# Patient Record
Sex: MALE | Race: WHITE | Employment: OTHER | URBAN - METROPOLITAN AREA
[De-identification: names, ages, dates, MRNs, and addresses within clinical notes are randomized per-mention and may not be internally consistent; named-entity substitution may affect disease eponyms.]

---

## 2017-01-23 ENCOUNTER — OFFICE VISIT (OUTPATIENT)
Dept: INTERNAL MEDICINE CLINIC | Age: 77
End: 2017-01-23

## 2017-01-23 ENCOUNTER — TELEPHONE (OUTPATIENT)
Dept: INTERNAL MEDICINE CLINIC | Age: 77
End: 2017-01-23

## 2017-01-23 VITALS
WEIGHT: 226.4 LBS | HEART RATE: 72 BPM | OXYGEN SATURATION: 97 % | HEIGHT: 73 IN | RESPIRATION RATE: 16 BRPM | TEMPERATURE: 98.1 F | BODY MASS INDEX: 30 KG/M2 | DIASTOLIC BLOOD PRESSURE: 78 MMHG | SYSTOLIC BLOOD PRESSURE: 122 MMHG

## 2017-01-23 DIAGNOSIS — H65.92 MIDDLE EAR EFFUSION, LEFT: Primary | ICD-10-CM

## 2017-01-23 RX ORDER — ZOLPIDEM TARTRATE 5 MG/1
1 TABLET ORAL
Refills: 3 | COMMUNITY
Start: 2017-01-06 | End: 2017-07-06 | Stop reason: ALTCHOICE

## 2017-01-23 NOTE — TELEPHONE ENCOUNTER
Left detailed message that is the generic for flonase and fine to use and to let us know if he needs a refill per Dr Justice Carmichael.

## 2017-01-23 NOTE — PROGRESS NOTES
Glory Pacheco is a 77 y.o. male who was seen in clinic today (1/23/2017). Assessment & Plan:  Dalton Terrazas was seen today for ear pain. Diagnoses and all orders for this visit:    Middle ear effusion, left- this is a new problem, differential dx reviewed with the patient, does not sound infectious. Will treat with flonase & can try sudafed as long as no effect on BP. See AVS, Red flags were reviewed with the patient to RTC or notify me, expected time course for resolution reviewed. If no changes in 5-7 days will update me and will have to see ENT. Follow-up Disposition:  Return if symptoms worsen or fail to improve.       ----------------------------------------------------------------------    Subjective:  HEENT Review  He returns to clinic today to talk about hearing loss, left sided. This has been present for 1-2 weeks, unchanged since that time. He reports started after a URI and all his symptoms have resolved. Treatment to date includes: corcidine HBP. No hearing loss or tinnitus. He reports on/off popping sensation. No h/o trauma. He does wear hearing aides, did go to see specialist.           Prior to Admission medications    Medication Sig Start Date End Date Taking? Authorizing Provider   lisinopril (PRINIVIL, ZESTRIL) 10 mg tablet TAKE 1 TABLET BY MOUTH DAILY 12/15/16  Yes Marcela Ware MD   gabapentin (NEURONTIN) 100 mg capsule TAKE 1 CAPSULE BY MOUTH THREE TIMES DAILY 12/15/16  Yes Marcela Ware MD   metoprolol tartrate (LOPRESSOR) 25 mg tablet TAKE 1/2 TABLET BY MOUTH TWICE DAILY 12/15/16  Yes Marcela Ware MD   sertraline (ZOLOFT) 50 mg tablet TAKE 1 TABLET BY MOUTH DAILY 11/16/16  Yes Marcela Ware MD   Blood-Glucose Meter (TRUE METRIX GLUCOSE METER) misc Use to test blood sugars daily. DX:  E11.49 11/10/16  Yes Marcela Ware MD   glucose blood VI test strips (TRUE METRIX GLUCOSE TEST STRIP) strip Test blood sugars daily.   DX: E11.49 11/10/16  Yes Addy Fountain MD   balsalazide (COLAZAL) 750 mg capsule Take 2,250 mg by mouth two (2) times a day. Yes Historical Provider   metFORMIN (GLUCOPHAGE) 500 mg tablet TAKE 1 TABLET BY MOUTH TWICE DAILY WITH A MEAL 6/10/16  Yes Addy Fountain MD   simvastatin (ZOCOR) 40 mg tablet Take 1 Tab by mouth nightly. 6/10/16  Yes Addy Fountain MD   Lancets & Blood Glucose Strips Cmpk Element glucometer 2/26/13  Yes Compa Espinosa MD   aspirin 81 mg tablet Take 81 mg by mouth daily. Indications: MYOCARDIAL INFARCTION PREVENTION   Yes Historical Provider   MULTIVITAMINS WITH FLUORIDE (MULTI-VITAMIN PO) Take 1 tablet by mouth daily. Yes Historical Provider   Cholecalciferol, Vitamin D3, (VITAMIN D3) 1,000 unit Cap Take 2,000 Units by mouth daily. Indications: PREVENTION OF VITAMIN D DEFICIENCY   Yes Historical Provider   zolpidem (AMBIEN) 5 mg tablet Take 1 Tab by mouth nightly as needed. 1/6/17   Historical Provider   fluticasone (FLONASE) 50 mcg/actuation nasal spray USE 2 SPRAYS IN EACH NOSTRIL DAILY 6/14/16   Addy Fountain MD   VIT B6/MAG CIT & OX/POTASS CIT (THERALITH XR PO) Take 2 tablets by mouth two (2) times a day. Historical Provider   psyllium (METAMUCIL) 1.7 g Wafr Take 0.5 Wafers by mouth daily. Indications: CONSTIPATION    Historical Provider          Allergies   Allergen Reactions    Apriso [Mesalamine] Diarrhea    Prednisone Other (comments)     constipation    Sulfasalazine Hives           ROS : per HPI       Objective:   Physical Exam   Constitutional: No distress. HENT:   Right Ear: Tympanic membrane is not erythematous and not bulging. No middle ear effusion. Left Ear: Tympanic membrane is not erythematous and not bulging. A middle ear effusion (significant, appears to be yellowish fluid) is present. Nose: No mucosal edema or rhinorrhea. Mouth/Throat: Uvula is midline and mucous membranes are normal. No oropharyngeal exudate or posterior oropharyngeal erythema.    Eyes: Conjunctivae are normal. No scleral icterus. Cardiovascular: Regular rhythm and normal heart sounds. No murmur heard. Pulmonary/Chest: Effort normal and breath sounds normal. He has no wheezes. He has no rales. Visit Vitals    /78 (BP 1 Location: Right arm, BP Patient Position: Sitting)    Pulse 72    Temp 98.1 °F (36.7 °C) (Oral)    Resp 16    Ht 6' 1\" (1.854 m)    Wt 226 lb 6.4 oz (102.7 kg)    SpO2 97%    BMI 29.87 kg/m2         Disclaimer:  Advised him to call back or return to office if symptoms worsen/change/persist.  Discussed expected course/resolution/complications of diagnosis in detail with patient. Medication risks/benefits/costs/interactions/alternatives discussed with patient. He was given an after visit summary which includes diagnoses, current medications, & vitals. He expressed understanding with the diagnosis and plan.         Clinton Vallecillo MD

## 2017-01-23 NOTE — PATIENT INSTRUCTIONS
Eustachian Tube Problems: Care Instructions  Your Care Instructions    The eustachian (say \"you-STAY-shee-un\") tubes run between the inside of the ears and the throat. They keep air pressure stable in the ears. If your eustachian tubes become blocked, the air pressure in your ears changes. The fluids from a cold can clog eustachian tubes, causing pain in the ears. A quick change in air pressure can cause eustachian tubes to close up. This might happen when an airplane changes altitude or when a  goes up or down underwater. Eustachian tube problems often clear up on their own or after antibiotic treatment. If your tubes continue to be blocked, you may need surgery. Follow-up care is a key part of your treatment and safety. Be sure to make and go to all appointments, and call your doctor if you are having problems. It's also a good idea to know your test results and keep a list of the medicines you take. How can you care for yourself at home? · To ease ear pain, apply a warm washcloth or a heating pad set on low. There may be some drainage from the ear when the heat melts earwax. Put a cloth between the heat source and your skin. Do not use a heating pad with children. · If your doctor prescribed antibiotics, take them as directed. Do not stop taking them just because you feel better. You need to take the full course of antibiotics. · Your doctor may recommend over-the-counter medicine. Be safe with medicines. Oral or nasal decongestants may relieve ear pain. Avoid decongestants that are combined with antihistamines, which tend to cause more blockage. But if allergies seem to be the problem, your doctor may recommend a combination. Be careful with cough and cold medicines. Don't give them to children younger than 6, because they don't work for children that age and can even be harmful. For children 6 and older, always follow all the instructions carefully.  Make sure you know how much medicine to give and how long to use it. And use the dosing device if one is included. When should you call for help? Call your doctor now or seek immediate medical care if:  · You develop sudden, complete hearing loss. · You have severe pain or feel dizzy. · You have new or increasing pus or blood draining from your ear. · You have redness, swelling, or pain around or behind the ear. Watch closely for changes in your health, and be sure to contact your doctor if:  · You do not get better after 2 weeks. · You have any new symptoms, such as itching or a feeling of fullness in the ear. Where can you learn more? Go to http://jesús-jhoana.info/. Enter Y822 in the search box to learn more about \"Eustachian Tube Problems: Care Instructions. \"  Current as of: July 29, 2016  Content Version: 11.1  © 4467-1300 Healthwise, Incorporated. Care instructions adapted under license by [employer] (which disclaims liability or warranty for this information). If you have questions about a medical condition or this instruction, always ask your healthcare professional. [address] any warranty or liability for your use of this information.

## 2017-01-23 NOTE — MR AVS SNAPSHOT
Visit Information Date & Time Provider Department Dept. Phone Encounter #  
 1/23/2017  8:15 AM Danilo Salazar, [address] [facility] [phone] [id number] Follow-up Instructions Return if symptoms worsen or fail to improve. Your Appointments 2/14/2017  9:30 AM  
PHYSICAL with Danilo Salazar MD  
[facility]) Appt Note: CPE; .  
 [address] [facility] [address]  
[id number]  [facility] Upcoming Health Maintenance Date Due  
 MEDICARE YEARLY EXAM 12/20/2015 COLONOSCOPY 6/3/2016 HEMOGLOBIN A1C Q6M 5/14/2017 MICROALBUMIN Q1 5/23/2017 EYE EXAM RETINAL OR DILATED Q1 6/8/2017 FOOT EXAM Q1 6/10/2017 LIPID PANEL Q1 11/14/2017 GLAUCOMA SCREENING Q2Y 6/9/2018 DTaP/Tdap/Td series (2 - Td) 10/22/2022 Allergies as of 1/23/2017  Review Complete On: 1/23/2017 By: Danilo Salazar MD  
  
 Severity Noted Reaction Type Reactions Apriso [Mesalamine]  10/25/2016    Diarrhea Prednisone  10/19/2015    Other (comments)  
 constipation Sulfasalazine  10/25/2016    Hives Current Immunizations  Reviewed on 10/19/2015 Name Date Influenza High Dose Vaccine PF 9/25/2016, 10/17/2015, 10/1/2014 Influenza Vaccine 9/16/2012 Pneumococcal Conjugate (PCV-13) 7/12/2015 Pneumococcal Polysaccharide (PPSV-23) 9/25/2016 Pneumococcal Vaccine (Unspecified Type) 10/22/2012 TDAP Vaccine 10/22/2012 Zoster Vaccine, Live 3/11/2015 Not reviewed this visit You Were Diagnosed With   
  
 Codes Comments Middle ear effusion, left    -  Primary ICD-10-CM: H65.92 
ICD-9-CM: 381. 4 Vitals BP Pulse Temp Resp Height(growth percentile) Weight(growth percentile) 122/78 (BP 1 Location: Right arm, BP Patient Position: Sitting) 72 98.1 °F (36.7 °C) (Oral) 16 6' 1\" (1.854 m) 226 lb 6.4 oz (102.7 kg) SpO2 BMI Smoking Status 97% 29.87 kg/m2 Former Smoker Vitals History BMI and BSA Data Body Mass Index Body Surface Area  
 29.87 kg/m 2 2.3 m 2 Preferred Pharmacy Pharmacy Name Phone [facility], [address] [place] [address] [phone] Your Updated Medication List  
  
   
This list is accurate as of: 1/23/17  8:32 AM.  Always use your most recent med list.  
  
  
  
  
 aspirin 81 mg tablet Take 81 mg by mouth daily. Indications: MYOCARDIAL INFARCTION PREVENTION  
  
 balsalazide 750 mg capsule Commonly known as:  [place] So Take 2,250 mg by mouth two (2) times a day. Blood-Glucose Meter Misc Commonly known as:  TRUE METRIX GLUCOSE METER Use to test blood sugars daily. DX:  E11.49  
  
 fluticasone 50 mcg/actuation nasal spray Commonly known as:  FLONASE  
USE 2 SPRAYS IN EACH NOSTRIL DAILY  
  
 gabapentin 100 mg capsule Commonly known as:  NEURONTIN  
TAKE 1 CAPSULE BY MOUTH THREE TIMES DAILY  
  
 glucose blood VI test strips strip Commonly known as:  TRUE METRIX GLUCOSE TEST STRIP Test blood sugars daily. DX: E11.49 Lancets & Blood Glucose Strips Cmpk Element glucometer  
  
 lisinopril 10 mg tablet Commonly known as:  PRINIVIL, ZESTRIL  
TAKE 1 TABLET BY MOUTH DAILY METAMUCIL Wafr oral wafer Generic drug:  psyllium Take 0.5 Wafers by mouth daily. Indications: CONSTIPATION  
  
 metFORMIN 500 mg tablet Commonly known as:  GLUCOPHAGE  
TAKE 1 TABLET BY MOUTH TWICE DAILY WITH A MEAL  
  
 metoprolol tartrate 25 mg tablet Commonly known as:  LOPRESSOR  
TAKE 1/2 TABLET BY MOUTH TWICE DAILY MULTI-VITAMIN PO Take 1 tablet by mouth daily. sertraline 50 mg tablet Commonly known as:  ZOLOFT  
TAKE 1 TABLET BY MOUTH DAILY  
  
 simvastatin 40 mg tablet Commonly known as:  ZOCOR Take 1 Tab by mouth nightly.   
  
 THERALITH XR PO  
 Take 2 tablets by mouth two (2) times a day. VITAMIN D3 1,000 unit Cap Generic drug:  cholecalciferol Take 2,000 Units by mouth daily. Indications: PREVENTION OF VITAMIN D DEFICIENCY  
  
 zolpidem 5 mg tablet Commonly known as:  AMBIEN Take 1 Tab by mouth nightly as needed. Follow-up Instructions Return if symptoms worsen or fail to improve. Patient Instructions Eustachian Tube Problems: Care Instructions Your Care Instructions The eustachian (say \"you-STAY-shee-un\") tubes run between the inside of the ears and the throat. They keep air pressure stable in the ears. If your eustachian tubes become blocked, the air pressure in your ears changes. The fluids from a cold can clog eustachian tubes, causing pain in the ears. A quick change in air pressure can cause eustachian tubes to close up. This might happen when an airplane changes altitude or when a  goes up or down underwater. Eustachian tube problems often clear up on their own or after antibiotic treatment. If your tubes continue to be blocked, you may need surgery. Follow-up care is a key part of your treatment and safety. Be sure to make and go to all appointments, and call your doctor if you are having problems. It's also a good idea to know your test results and keep a list of the medicines you take. How can you care for yourself at home? · To ease ear pain, apply a warm washcloth or a heating pad set on low. There may be some drainage from the ear when the heat melts earwax. Put a cloth between the heat source and your skin. Do not use a heating pad with children. · If your doctor prescribed antibiotics, take them as directed. Do not stop taking them just because you feel better. You need to take the full course of antibiotics. · Your doctor may recommend over-the-counter medicine. Be safe with medicines. Oral or nasal decongestants may relieve ear pain.  Avoid decongestants that are combined with antihistamines, which tend to cause more blockage. But if allergies seem to be the problem, your doctor may recommend a combination. Be careful with cough and cold medicines. Don't give them to children younger than 6, because they don't work for children that age and can even be harmful. For children 6 and older, always follow all the instructions carefully. Make sure you know how much medicine to give and how long to use it. And use the dosing device if one is included. When should you call for help? Call your doctor now or seek immediate medical care if: 
· You develop sudden, complete hearing loss. · You have severe pain or feel dizzy. · You have new or increasing pus or blood draining from your ear. · You have redness, swelling, or pain around or behind the ear. Watch closely for changes in your health, and be sure to contact your doctor if: 
· You do not get better after 2 weeks. · You have any new symptoms, such as itching or a feeling of fullness in the ear. Where can you learn more? Go to http://jesús-jhoana.info/. Enter Y822 in the search box to learn more about \"Eustachian Tube Problems: Care Instructions. \" Current as of: July 29, 2016 Content Version: 11.1 © 1640-6720 [employer]. Care instructions adapted under license by [employer] (which disclaims liability or warranty for this information). If you have questions about a medical condition or this instruction, always ask your healthcare professional. [place] [address] any warranty or liability for your use of this information. Introducing [facility]! Valerie Ashraf introduces [employer] patient portal. Now you can access parts of your medical record, email your doctor's office, and request medication refills online. 1. In your internet browser, go to [email or web address] 2. Click on the First Time User? Click Here link in the Sign In box. You will see the New Member Sign Up page. 3. Enter your [email or web address] Access Code exactly as it appears below. You will not need to use this code after youve completed the sign-up process. If you do not sign up before the expiration date, you must request a new code. · [email or web address] Access Code: Farooq Blanc Expires: 1/23/2017 10:12 AM 
 
4. Enter the last four digits of your Social Security Number (xxxx) and Date of Birth (mm/dd/yyyy) as indicated and click Submit. You will be taken to the next sign-up page. 5. Create a [email or web address] ID. This will be your [email or web address] login ID and cannot be changed, so think of one that is secure and easy to remember. 6. Create a [email or web address] password. You can change your password at any time. 7. Enter your Password Reset Question and Answer. This can be used at a later time if you forget your password. 8. Enter your e-mail address. You will receive e-mail notification when new information is available in [address] 9. Click Sign Up. You can now view and download portions of your medical record. 10. Click the Download Summary menu link to download a portable copy of your medical information. If you have questions, please visit the Frequently Asked Questions section of the [email or web address] website. Remember, [email or web address] is NOT to be used for urgent needs. For medical emergencies, dial 911. Now available from your iPhone and Android! Please provide this summary of care documentation to your next provider. Your primary care clinician is listed as Oscar Webb. If you have any questions after today's visit, please call [phone].

## 2017-01-30 ENCOUNTER — TELEPHONE (OUTPATIENT)
Dept: INTERNAL MEDICINE CLINIC | Age: 77
End: 2017-01-30

## 2017-01-30 NOTE — TELEPHONE ENCOUNTER
Spoke with pt who states his ear is not really better he states he did take the flonase and sudafed but when he lays back in the recliner his ear will pop. Then when he stands it goes back to not being able to hear out of the ear. Please advise.

## 2017-01-30 NOTE — TELEPHONE ENCOUNTER
Needs to see ENT (Dr Radha Gold or Mik Segura). Did not look infected so no abx. If these meds did not help I want the specialist input.

## 2017-01-31 DIAGNOSIS — E11.49 CONTROLLED TYPE 2 DIABETES MELLITUS WITH OTHER NEUROLOGIC COMPLICATION: Primary | ICD-10-CM

## 2017-02-03 DIAGNOSIS — E11.42 DIABETIC PERIPHERAL NEUROPATHY (HCC): ICD-10-CM

## 2017-02-03 RX ORDER — METFORMIN HYDROCHLORIDE 500 MG/1
TABLET ORAL
Qty: 180 TAB | Refills: 1 | Status: SHIPPED | OUTPATIENT
Start: 2017-02-03 | End: 2017-07-06 | Stop reason: SDUPTHER

## 2017-02-04 DIAGNOSIS — E11.42 DIABETIC PERIPHERAL NEUROPATHY (HCC): ICD-10-CM

## 2017-02-04 DIAGNOSIS — E78.5 HYPERLIPEMIA: ICD-10-CM

## 2017-02-05 RX ORDER — SIMVASTATIN 40 MG/1
TABLET, FILM COATED ORAL
Qty: 90 TAB | Refills: 0 | Status: SHIPPED | OUTPATIENT
Start: 2017-02-05 | End: 2017-07-06 | Stop reason: SDUPTHER

## 2017-02-07 ENCOUNTER — HOSPITAL ENCOUNTER (OUTPATIENT)
Dept: LAB | Age: 77
Discharge: HOME | End: 2017-02-07
Payer: MEDICARE

## 2017-02-07 PROCEDURE — 82043 UR ALBUMIN QUANTITATIVE: CPT

## 2017-02-07 PROCEDURE — 83036 HEMOGLOBIN GLYCOSYLATED A1C: CPT

## 2017-02-07 PROCEDURE — 80053 COMPREHEN METABOLIC PANEL: CPT

## 2017-02-07 PROCEDURE — 36415 COLL VENOUS BLD VENIPUNCTURE: CPT

## 2017-02-08 LAB
ALBUMIN SERPL-MCNC: 4.5 G/DL (ref 3.5–4.8)
ALBUMIN/CREAT UR: 9.4 MG/G CREAT (ref 0–30)
ALBUMIN/GLOB SERPL: 2 {RATIO} (ref 1.1–2.5)
ALP SERPL-CCNC: 81 IU/L (ref 39–117)
ALT SERPL-CCNC: 24 IU/L (ref 0–44)
AST SERPL-CCNC: 28 IU/L (ref 0–40)
BILIRUB SERPL-MCNC: 0.4 MG/DL (ref 0–1.2)
BUN SERPL-MCNC: 9 MG/DL (ref 8–27)
BUN/CREAT SERPL: 9 (ref 10–22)
CALCIUM SERPL-MCNC: 9.6 MG/DL (ref 8.6–10.2)
CHLORIDE SERPL-SCNC: 95 MMOL/L (ref 96–106)
CO2 SERPL-SCNC: 22 MMOL/L (ref 18–29)
CREAT SERPL-MCNC: 1.01 MG/DL (ref 0.76–1.27)
CREAT UR-MCNC: 68 MG/DL
EST. AVERAGE GLUCOSE BLD GHB EST-MCNC: 151 MG/DL
GLOBULIN SER CALC-MCNC: 2.3 G/DL (ref 1.5–4.5)
GLUCOSE SERPL-MCNC: 149 MG/DL (ref 65–99)
HBA1C MFR BLD: 6.9 % (ref 4.8–5.6)
MICROALBUMIN UR-MCNC: 6.4 UG/ML
POTASSIUM SERPL-SCNC: 4.7 MMOL/L (ref 3.5–5.2)
PROT SERPL-MCNC: 6.8 G/DL (ref 6–8.5)
SODIUM SERPL-SCNC: 141 MMOL/L (ref 134–144)

## 2017-02-14 ENCOUNTER — OFFICE VISIT (OUTPATIENT)
Dept: INTERNAL MEDICINE CLINIC | Age: 77
End: 2017-02-14

## 2017-02-14 VITALS
OXYGEN SATURATION: 98 % | HEART RATE: 76 BPM | WEIGHT: 225.4 LBS | RESPIRATION RATE: 16 BRPM | HEIGHT: 72 IN | DIASTOLIC BLOOD PRESSURE: 68 MMHG | SYSTOLIC BLOOD PRESSURE: 102 MMHG | TEMPERATURE: 97.9 F | BODY MASS INDEX: 30.53 KG/M2

## 2017-02-14 DIAGNOSIS — Z00.00 MEDICARE ANNUAL WELLNESS VISIT, SUBSEQUENT: Primary | ICD-10-CM

## 2017-02-14 DIAGNOSIS — F33.41 RECURRENT MAJOR DEPRESSIVE DISORDER, IN PARTIAL REMISSION (HCC): ICD-10-CM

## 2017-02-14 DIAGNOSIS — I25.10 CORONARY ARTERY DISEASE INVOLVING NATIVE CORONARY ARTERY OF NATIVE HEART WITHOUT ANGINA PECTORIS: ICD-10-CM

## 2017-02-14 DIAGNOSIS — Z71.89 ACP (ADVANCE CARE PLANNING): ICD-10-CM

## 2017-02-14 DIAGNOSIS — Z00.00 ROUTINE GENERAL MEDICAL EXAMINATION AT A HEALTH CARE FACILITY: ICD-10-CM

## 2017-02-14 DIAGNOSIS — Z13.39 SCREENING FOR ALCOHOLISM: ICD-10-CM

## 2017-02-14 DIAGNOSIS — E66.9 OBESITY (BMI 30.0-34.9): ICD-10-CM

## 2017-02-14 DIAGNOSIS — K51.919 ULCERATIVE COLITIS WITH COMPLICATION, UNSPECIFIED LOCATION (HCC): ICD-10-CM

## 2017-02-14 DIAGNOSIS — E11.49 CONTROLLED TYPE 2 DIABETES MELLITUS WITH OTHER NEUROLOGIC COMPLICATION: ICD-10-CM

## 2017-02-14 NOTE — MR AVS SNAPSHOT
Visit Information Date & Time Provider Department Dept. Phone Encounter #  
 2/14/2017  9:30 AM Richrd Paget, [facility] Internal Medicine [phone] [id number] Follow-up Instructions Return in about 6 months (around 8/14/2017). Upcoming Health Maintenance Date Due  
 MEDICARE YEARLY EXAM 12/20/2015 COLONOSCOPY 6/3/2016 EYE EXAM RETINAL OR DILATED Q1 6/8/2017 FOOT EXAM Q1 6/10/2017 HEMOGLOBIN A1C Q6M 8/7/2017 LIPID PANEL Q1 11/14/2017 MICROALBUMIN Q1 2/7/2018 GLAUCOMA SCREENING Q2Y 6/9/2018 DTaP/Tdap/Td series (2 - Td) 10/22/2022 Allergies as of 2/14/2017  Review Complete On: 2/14/2017 By: Richrd Paget, MD  
  
 Severity Noted Reaction Type Reactions Apriso [Mesalamine]  10/25/2016    Diarrhea Prednisone  10/19/2015    Other (comments)  
 constipation Sulfasalazine  10/25/2016    Hives Current Immunizations  Reviewed on 2/14/2017 Name Date Influenza High Dose Vaccine PF 9/25/2016, 10/17/2015, 10/1/2014 Influenza Vaccine 9/16/2012 Pneumococcal Conjugate (PCV-13) 7/12/2015 Pneumococcal Polysaccharide (PPSV-23) 9/25/2016 Pneumococcal Vaccine (Unspecified Type) 10/22/2012 TDAP Vaccine 10/22/2012 Zoster Vaccine, Live 3/11/2015 Reviewed by Eryn Hatfield RN on 2/14/2017 at  9:48 AM  
You Were Diagnosed With   
  
 Codes Comments Medicare annual wellness visit, subsequent    -  Primary ICD-10-CM: Z00.00 ICD-9-CM: V70.0 ACP (advance care planning)     ICD-10-CM: Z71.89 ICD-9-CM: V65.49 Obesity (BMI 30.0-34.9)     ICD-10-CM: I23.2 ICD-9-CM: 278.00 Routine general medical examination at a health care facility     ICD-10-CM: Z00.00 ICD-9-CM: V70.0 Screening for alcoholism     ICD-10-CM: Z13.89 ICD-9-CM: V79.1 Coronary artery disease involving native coronary artery of native heart without angina pectoris     ICD-10-CM: I25.10 ICD-9-CM: 414.01   
 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 
ICD-9-CM: 296.35 Ulcerative colitis with complication, unspecified location [facility])     ICD-10-CM: F77.517 ICD-9-CM: 345. 9 Vitals BP Pulse Temp Resp Height(growth percentile) Weight(growth percentile) 102/68 76 97.9 °F (36.6 °C) (Oral) 16 6' 0.1\" (1.831 m) 225 lb 6.4 oz (102.2 kg) SpO2 BMI Smoking Status 98% 30.48 kg/m2 Former Smoker BMI and BSA Data Body Mass Index Body Surface Area  
 30.48 kg/m 2 2.28 m 2 Preferred Pharmacy Pharmacy Name Phone [facility] [address] [place] [address], [address] [place] [address] [phone] Your Updated Medication List  
  
   
This list is accurate as of: 2/14/17 10:29 AM.  Always use your most recent med list.  
  
  
  
  
 aspirin 81 mg tablet Take 81 mg by mouth daily. Indications: MYOCARDIAL INFARCTION PREVENTION  
  
 balsalazide 750 mg capsule Commonly known as:  Leonard Noy Take 2,250 mg by mouth two (2) times a day. Blood-Glucose Meter Misc Commonly known as:  TRUE METRIX GLUCOSE METER Use to test blood sugars daily. DX:  E11.49  
  
 fluticasone 50 mcg/actuation nasal spray Commonly known as:  FLONASE  
USE 2 SPRAYS IN EACH NOSTRIL DAILY  
  
 gabapentin 100 mg capsule Commonly known as:  NEURONTIN  
TAKE 1 CAPSULE BY MOUTH THREE TIMES DAILY  
  
 glucose blood VI test strips strip Commonly known as:  TRUE METRIX GLUCOSE TEST STRIP Test blood sugars daily. DX: E11.49 Lancets & Blood Glucose Strips Cmpk Element glucometer  
  
 lisinopril 10 mg tablet Commonly known as:  PRINIVIL, ZESTRIL  
TAKE 1 TABLET BY MOUTH DAILY  
  
 metFORMIN 500 mg tablet Commonly known as:  GLUCOPHAGE  
TAKE 1 TABLET BY MOUTH TWICE DAILY WITH MEAL  
  
 metoprolol tartrate 25 mg tablet Commonly known as:  LOPRESSOR  
TAKE 1/2 TABLET BY MOUTH TWICE DAILY MULTI-VITAMIN PO Take 1 tablet by mouth daily. sertraline 50 mg tablet Commonly known as:  ZOLOFT  
TAKE 1 TABLET BY MOUTH DAILY  
  
 simvastatin 40 mg tablet Commonly known as:  ZOCOR  
TAKE 1 TABLET BY MOUTH NIGHTLY THERALITH XR PO Take 2 tablets by mouth two (2) times a day. VITAMIN D3 1,000 unit Cap Generic drug:  cholecalciferol Take 2,000 Units by mouth daily. Indications: PREVENTION OF VITAMIN D DEFICIENCY  
  
 zolpidem 5 mg tablet Commonly known as:  AMBIEN Take 1 Tab by mouth nightly as needed. We Performed the Following Eliceovalorie  [[id number] [facility]] Follow-up Instructions Return in about 6 months (around 8/14/2017). Patient Instructions Medicare Wellness Visit, Male The best way to live healthy is to have a healthy lifestyle by eating a well-balanced diet, exercising regularly, limiting alcohol and stopping smoking. Regular physical exams and screening tests are another way to keep healthy. Preventive exams provided by your health care provider can find health problems before they become diseases or illnesses. Preventive services including immunizations, screening tests, monitoring and exams can help you take care of your own health. All people over age 72 should have a pneumovax  and and a prevnar shot to prevent pneumonia. These are once in a lifetime unless you and your provider decide differently. All people over 65 should have a yearly flu shot and a tetanus vaccine every 10 years. Screening for diabetes mellitus with a blood sugar test should be done every year.  
 
Glaucoma is a disease of the eye due to increased ocular pressure that can lead to blindness and it should be done every year by an eye professional. 
 
Cardiovascular screening tests that check for elevated lipids (fatty part of blood) which can lead to heart disease and strokes should be done every 5 years. Colorectal screening that evaluates for blood or polyps in your colon should be done yearly as a stool test or every five years as a flexible sigmoidoscope or every 10 years as a colonoscopy up to age 76. Men up to age 76 may need a screening blood test for prostate cancer at certain intervals, depending on their personal and family history. This decision is between the patient and his provider. If you have been a smoker or had family history of abdominal aortic aneurysms, you and your provider may decide to schedule an ultrasound test of your aorta. Hepatitis C screening is also recommended for anyone born between [birth year] through [address]. A shingles vaccine is also recommended once in a lifetime after age 61. Your Medicare Wellness Exam is recommended annually. Here is a list of your current Health Maintenance items with a due date: 
Health Maintenance Due Topic Date Due  
 Annual Well Visit  12/20/2015  Colonoscopy  06/03/2016 Advance Directives: Care Instructions Your Care Instructions An advance directive is a legal way to state your wishes at the end of your life. It tells your family and your doctor what to do if you can no longer say what you want. There are two main types of advance directives. You can change them any time that your wishes change. · A living will tells your family and your doctor your wishes about life support and other treatment. · A medical power of  lets you name a person to make treatment decisions for you when you can't speak for yourself. This person is called a health care agent. If you do not have an advance directive, decisions about your medical care may be made by a doctor or a  who doesn't know you. It may help to think of an advance directive as a gift to the people who care for you.  If you have one, they won't have to make tough decisions by themselves. Follow-up care is a key part of your treatment and safety. Be sure to make and go to all appointments, and call your doctor if you are having problems. It's also a good idea to know your test results and keep a list of the medicines you take. How can you care for yourself at home? · Discuss your wishes with your loved ones and your doctor. This way, there are no surprises. · Many states have a unique form. Or you might use a universal form that has been approved by many states. This kind of form can sometimes be completed and stored online. Your electronic copy will then be available wherever you have a connection to the Internet. In most cases, doctors will respect your wishes even if you have a form from a different state. · You don't need a  to do an advance directive. But you may want to get legal advice. · Think about these questions when you prepare an advance directive: ¨ Who do you want to make decisions about your medical care if you are not able to? Many people choose a family member, close friend, or doctor. ¨ Do you know enough about life support methods that might be used? If not, talk to your doctor so you understand. ¨ What are you most afraid of that might happen? You might be afraid of having pain, losing your independence, or being kept alive by machines. ¨ Where would you prefer to die? Choices include your home, a hospital, or a nursing home. ¨ Would you like to have information about hospice care to support you and your family? ¨ Do you want to donate organs when you die? ¨ Do you want certain Baptism practices performed before you die? If so, put your wishes in the advance directive. · Read your advance directive every year, and make changes as needed. When should you call for help? Be sure to contact your doctor if you have any questions. Where can you learn more? Go to http://jesús-jhoana.info/. Enter R264 in the search box to learn more about \"Advance Directives: Care Instructions. \" Current as of: February 24, 2016 Content Version: 11.1 © 1955-7309 [employer]. Care instructions adapted under license by [employer] (which disclaims liability or warranty for this information). If you have questions about a medical condition or this instruction, always ask your healthcare professional. [address] any warranty or liability for your use of this information. Introducing [facility]! [facility] introduces [employer] patient portal. Now you can access parts of your medical record, email your doctor's office, and request medication refills online. 1. In your internet browser, go to [email or web address] 2. Click on the First Time User? Click Here link in the Sign In box. You will see the New Member Sign Up page. 3. Enter your [employer] Access Code exactly as it appears below. You will not need to use this code after youve completed the sign-up process. If you do not sign up before the expiration date, you must request a new code. · [employer] Access Code: [email or web address] Expires: 5/15/2017  9:35 AM 
 
4. Enter the last four digits of your Social Security Number (xxxx) and Date of Birth (mm/dd/yyyy) as indicated and click Submit. You will be taken to the next sign-up page. 5. Create a [employer] ID. This will be your [employer] login ID and cannot be changed, so think of one that is secure and easy to remember. 6. Create a [employer] password. You can change your password at any time. 7. Enter your Password Reset Question and Answer. This can be used at a later time if you forget your password. 8. Enter your e-mail address. You will receive e-mail notification when new information is available in [address] 9. Click Sign Up. You can now view and download portions of your medical record. 10. Click the Download Summary menu link to download a portable copy of your medical information. If you have questions, please visit the Frequently Asked Questions section of the [employer] website. Remember, [employer] is NOT to be used for urgent needs. For medical emergencies, dial 911. Now available from your iPhone and Android! Please provide this summary of care documentation to your next provider. Your primary care clinician is listed as Simran Canales. If you have any questions after today's visit, please call [phone].

## 2017-02-14 NOTE — ACP (ADVANCE CARE PLANNING)
Advance Care Planning (ACP) Provider Note - Comprehensive     Date of ACP Conversation: 02/14/17  Persons included in Conversation:  patient and family    Authorized Decision Maker (if patient is incapable of making informed decisions): This person is: Other Legally Authorized Decision Maker (e.g. Next of Kin)        General ACP for ALL Patients with Decision Making Capacity:  Importance of advance care planning, including choosing a healthcare agent to communicate patient's healthcare decisions if patient lost the ability to make decisions, such as after a sudden illness or accident  Understanding of the healthcare agent role was assessed and information provided  Opportunity offered to explore how cultural, Mormon, spiritual, or personal beliefs would affect decisions for future care     For Serious or Chronic Illness:  His medical problems were reviewed with them. Understanding of medical condition    Understanding of CPR, goals and expected outcomes, benefits and burdens discussed. Information on CPR success rates provided (e.g. for CPR in hospital, survival to d/c at two weeks is 22%, for chronically ill or elderly/frail survival is less than 3%); Individual asked to communicate understanding of information in his/her own words. Interventions Provided:  Recommended communicating the plan and making copies for the healthcare agent, personal physician, and others as appropriate (e.g., health system)  Recommended review of completed ACP document annually or upon change in health status      he has an advanced directive - a copy HAS NOT been provided. Reviewed DNR/DNI and patient is not interested.

## 2017-02-14 NOTE — PROGRESS NOTES
Marcelle Cortez is a 77 y.o. male who was seen in clinic today (2/14/2017). Assessment & Plan:   Alfred Torres was seen today for complete physical.  Diagnoses and all orders for this visit:    Medicare annual wellness visit, subsequent    ACP (advance care planning)    Obesity (BMI 30.0-34.9)- stable, needs improvement, I have reviewed/discussed the above normal BMI with the patient. I have recommended the following interventions: encourage exercise and lifestyle education regarding diet. Screening for alcoholism    Coronary artery disease involving native coronary artery of native heart without angina pectoris- BP is well controlled, lipids are well controlled. Continue taking: current medications. 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 He was asking about increasing MTF, will keep at 1 tab BID for now and work on diet, if still no changes will increase at next visit   -     Depression Screen Annual    Recurrent major depressive disorder, in partial remission ([facility] [address]- stable, continue current treatment     Ulcerative colitis with complication, unspecified location [facility])- due for repeat c-scope         Follow-up Disposition:  Return in about 6 months (around 8/14/2017).        ------------------------------------------------------------------------------------------    Subjective: Annual Wellness Visit- Subsequent Visit    End of Life Planning: This was discussed with him today and he has an advanced directive - a copy HAS NOT been provided. Reviewed DNR/DNI and patient is not interested.       Depression Screen:   PHQ 2 / 9, over the last two weeks 2/14/2017   Little interest or pleasure in doing things Not at all   Feeling down, depressed or hopeless Several days   Total Score PHQ 2 1   Trouble falling or staying asleep, or sleeping too much Not at all   Feeling tired or having little energy Not at all   Poor appetite or overeating Several days   Feeling bad about yourself - or that you are a failure or have let yourself or your family down Several days   Trouble concentrating on things such as school, work, reading or watching TV Not at all   Moving or speaking so slowly that other people could have noticed; or the opposite being so fidgety that others notice Not at all   Thoughts of being better off dead, or hurting yourself in some way Not at all   PHQ 9 Score 3   How difficult have these problems made it for you to do your work, take care of your home and get along with others Somewhat difficult       Fall Risk:   Fall Risk Assessment, last 12 mths 2/14/2017   Able to walk? Yes   Fall in past 12 months? No       Abuse Screen:  Abuse Screening Questionnaire 2/14/2017   Do you ever feel afraid of your partner? N   Are you in a relationship with someone who physically or mentally threatens you? N   Is it safe for you to go home? Y         Alcohol Risk Factor Screening: On any occasion during the past 3 months, have you had more than 4 drinks containing alcohol? No  Do you average more than 14 drinks per week? No    Hearing Loss:  wears hearing aides    Activities of Daily Living:  Self-care. Requires assistance with: no ADLs    Adult Nutrition Screen:  No risk factors noted. Health Maintenance  Daily Aspirin: yes  AAA Screening: n/a (IPPE only)  Glaucoma Screening: UTD      Immunizations:     Influenza: up to date. Tetanus: up to date. Shingles: up to date. Pneumonia: up to date. Cancer screening:    Prostate: reviewed guidelines, will defer. Colon: up to date.         Patient Care Team:  Oscar Webb MD as PCP - General (Internal Medicine)  Miriam Garcia MD (Ophthalmology)  Quynh Anderson MD as Physician (Urology)  Rachael Mcguire RN as Nurse Rakesh Felix (Podiatry)  Jerri Pearl MD (Pulmonary Disease)  Willis Lopez MD (Gastroenterology)  Esau Valdovinos MD (Gastroenterology)         The following sections were reviewed & updated as appropriate: PMH, PSH, FH, and SH. Patient Active Problem List   Diagnosis Code    Essential hypertension, benign I10    CAD (coronary artery disease) I25.10    DM (diabetes mellitus) type II controlled, [facility] E11.49    Pure hypercholesterolemia E78.00    Depression F32.9    Chronic back pain M54.9, G89.29    UC (ulcerative colitis) ([facility] [address] K51.90    Pelvic kidney Q63.2    Scoliosis M41.9    Kidney stone N20.0    ACP (advance care planning) Z71.89    CARROL (obstructive sleep apnea) G47.33          Prior to Admission medications    Medication Sig Start Date End Date Taking? Authorizing Provider   simvastatin (ZOCOR) 40 mg tablet TAKE 1 TABLET BY MOUTH NIGHTLY 2/5/17  Yes Dipti Ware MD   metFORMIN (GLUCOPHAGE) 500 mg tablet TAKE 1 TABLET BY MOUTH TWICE DAILY WITH MEAL 2/3/17  Yes Dipti Ware MD   zolpidem (AMBIEN) 5 mg tablet Take 1 Tab by mouth nightly as needed. 1/6/17  Yes Historical Provider   lisinopril (PRINIVIL, ZESTRIL) 10 mg tablet TAKE 1 TABLET BY MOUTH DAILY 12/15/16  Yes Dipti Ware MD   gabapentin (NEURONTIN) 100 mg capsule TAKE 1 CAPSULE BY MOUTH THREE TIMES DAILY 12/15/16  Yes Dipti Ware MD   metoprolol tartrate (LOPRESSOR) 25 mg tablet TAKE 1/2 TABLET BY MOUTH TWICE DAILY 12/15/16  Yes Dipti Ware MD   sertraline (ZOLOFT) 50 mg tablet TAKE 1 TABLET BY MOUTH DAILY 11/16/16  Yes Dipti Ware MD   Blood-Glucose Meter (TRUE METRIX GLUCOSE METER) misc Use to test blood sugars daily. DX:  E11.49 11/10/16  Yes Dipti Ware MD   glucose blood VI test strips (TRUE METRIX GLUCOSE TEST STRIP) strip Test blood sugars daily. DX: E11.49 11/10/16  Yes Dipti Ware MD   balsalazide (COLAZAL) 750 mg capsule Take 2,250 mg by mouth two (2) times a day. Yes Historical Provider   fluticasone (FLONASE) 50 mcg/actuation nasal spray USE 2 SPRAYS IN EACH NOSTRIL DAILY  Patient taking differently: USE 2 SPRAYS IN EACH NOSTRIL DAILY as needed 6/14/16  Yes Javi Montanez MD   VIT B6/MAG CIT & OX/POTASS CIT (THERALITH XR PO) Take 2 tablets by mouth two (2) times a day. Yes Historical Provider   Lancets & Blood Glucose Strips Cmpk Element glucometer 2/26/13  Yes Kerry Llamas MD   aspirin 81 mg tablet Take 81 mg by mouth daily. Indications: MYOCARDIAL INFARCTION PREVENTION   Yes Historical Provider   MULTIVITAMINS WITH FLUORIDE (MULTI-VITAMIN PO) Take 1 tablet by mouth daily. Yes Historical Provider   Cholecalciferol, Vitamin D3, (VITAMIN D3) 1,000 unit Cap Take 2,000 Units by mouth daily. Indications: PREVENTION OF VITAMIN D DEFICIENCY   Yes Historical Provider          Allergies   Allergen Reactions    Apriso [Mesalamine] Diarrhea    Prednisone Other (comments)     constipation    Sulfasalazine Hives              Review of Systems   Constitutional: Negative for chills and fever. Respiratory: Negative for cough and shortness of breath. Cardiovascular: Negative for chest pain and palpitations. Gastrointestinal: Negative for abdominal pain, blood in stool, constipation, diarrhea, heartburn, nausea and vomiting. Genitourinary:        He denies: nocturia, urinary hesitancy, urinary frequency, double voiding. Musculoskeletal: Negative for joint pain and myalgias. Neurological: Negative for tingling, focal weakness and headaches. Endo/Heme/Allergies: Does not bruise/bleed easily. Psychiatric/Behavioral: Negative for depression. The patient is not nervous/anxious and does not have insomnia. Objective:   Physical Exam   Constitutional: He appears well-developed. No distress.    obese   HENT:   Right Ear: Tympanic membrane, external ear and ear canal normal.   Left Ear: Tympanic membrane, external ear and ear canal normal.   Nose: Nose normal. Mouth/Throat: Uvula is midline, oropharynx is clear and moist and mucous membranes are normal. No posterior oropharyngeal erythema. Eyes: Conjunctivae and lids are normal. No scleral icterus. Neck: Neck supple. Carotid bruit is not present. No thyromegaly present. Cardiovascular: Regular rhythm and normal heart sounds. No murmur heard. Pulses:       Dorsalis pedis pulses are 2+ on the right side, and 2+ on the left side. Posterior tibial pulses are 2+ on the right side, and 2+ on the left side. Pulmonary/Chest: Effort normal and breath sounds normal. He has no wheezes. He has no rales. Abdominal: Soft. Bowel sounds are normal. He exhibits no mass. There is no hepatosplenomegaly. There is no tenderness. Musculoskeletal: He exhibits no edema. Cervical back: Normal.        Thoracic back: He exhibits no bony tenderness. Lumbar back: Normal.   Lymphadenopathy:     He has no cervical adenopathy. Neurological: He has normal strength. No cranial nerve deficit or sensory deficit. Monofilament intact bilaterally. Pulses R: 2+ and L: 2+. No open wounds. No skin lesions. Skin: No rash noted. Psychiatric: He has a normal mood and affect. His behavior is normal.          Visit Vitals    /68    Pulse 76    Temp 97.9 °F (36.6 °C) (Oral)    Resp 16    Ht 6' 0.1\" (1.831 m)    Wt 225 lb 6.4 oz (102.2 kg)    SpO2 98%    BMI 30.48 kg/m2          Advised him to call back or return to office if symptoms worsen/change/persist.  Discussed expected course/resolution/complications of diagnosis in detail with patient. Medication risks/benefits/costs/interactions/alternatives discussed with patient. He was given an after visit summary which includes diagnoses, current medications, & vitals. He expressed understanding with the diagnosis and plan.         Monalisa Yoon MD

## 2017-02-14 NOTE — PATIENT INSTRUCTIONS
Medicare Wellness Visit, Male    The best way to live healthy is to have a healthy lifestyle by eating a well-balanced diet, exercising regularly, limiting alcohol and stopping smoking. Regular physical exams and screening tests are another way to keep healthy. Preventive exams provided by your health care provider can find health problems before they become diseases or illnesses. Preventive services including immunizations, screening tests, monitoring and exams can help you take care of your own health. All people over age 72 should have a pneumovax  and and a prevnar shot to prevent pneumonia. These are once in a lifetime unless you and your provider decide differently. All people over 65 should have a yearly flu shot and a tetanus vaccine every 10 years. Screening for diabetes mellitus with a blood sugar test should be done every year. Glaucoma is a disease of the eye due to increased ocular pressure that can lead to blindness and it should be done every year by an eye professional.    Cardiovascular screening tests that check for elevated lipids (fatty part of blood) which can lead to heart disease and strokes should be done every 5 years. Colorectal screening that evaluates for blood or polyps in your colon should be done yearly as a stool test or every five years as a flexible sigmoidoscope or every 10 years as a colonoscopy up to age 76. Men up to age 76 may need a screening blood test for prostate cancer at certain intervals, depending on their personal and family history. This decision is between the patient and his provider. If you have been a smoker or had family history of abdominal aortic aneurysms, you and your provider may decide to schedule an ultrasound test of your aorta. Hepatitis C screening is also recommended for anyone born between [birth year] through [address]. A shingles vaccine is also recommended once in a lifetime after age 61.     Your Medicare Wellness Exam is recommended annually. Here is a list of your current Health Maintenance items with a due date:  Health Maintenance Due   Topic Date Due    Annual Well Visit  12/20/2015    Colonoscopy  06/03/2016            Advance Directives: Care Instructions  Your Care Instructions  An advance directive is a legal way to state your wishes at the end of your life. It tells your family and your doctor what to do if you can no longer say what you want. There are two main types of advance directives. You can change them any time that your wishes change. · A living will tells your family and your doctor your wishes about life support and other treatment. · A medical power of  lets you name a person to make treatment decisions for you when you can't speak for yourself. This person is called a health care agent. If you do not have an advance directive, decisions about your medical care may be made by a doctor or a  who doesn't know you. It may help to think of an advance directive as a gift to the people who care for you. If you have one, they won't have to make tough decisions by themselves. Follow-up care is a key part of your treatment and safety. Be sure to make and go to all appointments, and call your doctor if you are having problems. It's also a good idea to know your test results and keep a list of the medicines you take. How can you care for yourself at home? · Discuss your wishes with your loved ones and your doctor. This way, there are no surprises. · Many states have a unique form. Or you might use a universal form that has been approved by many states. This kind of form can sometimes be completed and stored online. Your electronic copy will then be available wherever you have a connection to the Internet. In most cases, doctors will respect your wishes even if you have a form from a different state. · You don't need a  to do an advance directive.  But you may want to get legal advice. · Think about these questions when you prepare an advance directive:  ¨ Who do you want to make decisions about your medical care if you are not able to? Many people choose a family member, close friend, or doctor. ¨ Do you know enough about life support methods that might be used? If not, talk to your doctor so you understand. ¨ What are you most afraid of that might happen? You might be afraid of having pain, losing your independence, or being kept alive by machines. ¨ Where would you prefer to die? Choices include your home, a hospital, or a nursing home. ¨ Would you like to have information about hospice care to support you and your family? ¨ Do you want to donate organs when you die? ¨ Do you want certain Jainism practices performed before you die? If so, put your wishes in the advance directive. · Read your advance directive every year, and make changes as needed. When should you call for help? Be sure to contact your doctor if you have any questions. Where can you learn more? Go to http://jesús-jhoana.info/. Enter R264 in the search box to learn more about \"Advance Directives: Care Instructions. \"  Current as of: February 24, 2016  Content Version: 11.1  © 7508-0731 [employer]. Care instructions adapted under license by readness.com (which disclaims liability or warranty for this information). If you have questions about a medical condition or this instruction, always ask your healthcare professional. [place] [address] any warranty or liability for your use of this information.

## 2017-04-07 ENCOUNTER — OFFICE VISIT (OUTPATIENT)
Dept: INTERNAL MEDICINE CLINIC | Age: 77
End: 2017-04-07

## 2017-04-07 VITALS
OXYGEN SATURATION: 98 % | BODY MASS INDEX: 30.48 KG/M2 | DIASTOLIC BLOOD PRESSURE: 74 MMHG | SYSTOLIC BLOOD PRESSURE: 112 MMHG | HEART RATE: 84 BPM | RESPIRATION RATE: 14 BRPM | WEIGHT: 225 LBS | HEIGHT: 72 IN | TEMPERATURE: 98.2 F

## 2017-04-07 DIAGNOSIS — F33.1 MODERATE EPISODE OF RECURRENT MAJOR DEPRESSIVE DISORDER (HCC): Primary | ICD-10-CM

## 2017-04-07 RX ORDER — SERTRALINE HYDROCHLORIDE 50 MG/1
TABLET, FILM COATED ORAL
Qty: 135 TAB | Refills: 1 | Status: SHIPPED | OUTPATIENT
Start: 2017-04-07 | End: 2017-07-06 | Stop reason: SDUPTHER

## 2017-04-07 RX ORDER — TRAZODONE HYDROCHLORIDE 50 MG/1
50 TABLET ORAL
COMMUNITY
Start: 2017-04-07 | End: 2017-07-06 | Stop reason: SDUPTHER

## 2017-04-07 NOTE — MR AVS SNAPSHOT
Visit Information Date & Time Provider Department Dept. Phone Encounter #  
 4/7/2017  8:15 AM Yesica Curiel, [address] [facility] [phone] [id number] Follow-up Instructions Return in about 3 months (around 7/7/2017) for Regular follow up. Your Appointments 7/6/2017  7:45 AM  
ROUTINE CARE with Yesica Curiel MD  
[facility] [address]) Appt Note: 6 month f/u  
 [address] [place] [address] [facility] [id number] [place] [address] 32 [id number] [place] [address] [facility] Upcoming Health Maintenance Date Due COLONOSCOPY 6/3/2016 EYE EXAM RETINAL OR DILATED Q1 6/8/2017 FOOT EXAM Q1 6/10/2017 HEMOGLOBIN A1C Q6M 8/7/2017 LIPID PANEL Q1 11/14/2017 MICROALBUMIN Q1 2/7/2018 MEDICARE YEARLY EXAM 2/15/2018 GLAUCOMA SCREENING Q2Y 6/9/2018 DTaP/Tdap/Td series (2 - Td) 10/22/2022 Allergies as of 4/7/2017  Review Complete On: 4/7/2017 By: Yesica Curiel MD  
  
 Severity Noted Reaction Type Reactions Apriso [Mesalamine]  10/25/2016    Diarrhea Prednisone  10/19/2015    Other (comments)  
 constipation Sulfasalazine  10/25/2016    Hives Current Immunizations  Reviewed on 4/7/2017 Name Date Influenza High Dose Vaccine PF 9/25/2016, 10/17/2015, 10/1/2014 Influenza Vaccine 9/16/2012 Pneumococcal Conjugate (PCV-13) 7/12/2015 Pneumococcal Polysaccharide (PPSV-23) 9/25/2016 Pneumococcal Vaccine (Unspecified Type) 10/22/2012 TDAP Vaccine 10/22/2012 Zoster Vaccine, Live 3/11/2015 Reviewed by El Wong RN on 4/7/2017 at  8:17 AM  
You Were Diagnosed With   
  
 Codes Comments Moderate episode of recurrent major depressive disorder ([facility]    -  Primary ICD-10-CM: F33.1 ICD-9-CM: 296.32 Vitals BP Pulse Temp Resp Height(growth percentile) Weight(growth percentile) 112/74 84 98.2 °F (36.8 °C) (Oral) 14 6' 0.1\" (1.831 m) 225 lb (102.1 kg) SpO2 BMI Smoking Status 98% 30.43 kg/m2 Former Smoker Vitals History BMI and BSA Data Body Mass Index Body Surface Area  
 30.43 kg/m 2 2.28 m 2 Preferred Pharmacy Pharmacy Name Phone [facility] [address] [place] [address], [address] [place] [address] [phone] Your Updated Medication List  
  
   
This list is accurate as of: 4/7/17  8:51 AM.  Always use your most recent med list.  
  
  
  
  
 aspirin 81 mg tablet Take 81 mg by mouth daily. Indications: MYOCARDIAL INFARCTION PREVENTION  
  
 balsalazide 750 mg capsule Commonly known as:  Natha Nageotte Take 2,250 mg by mouth two (2) times a day. Blood-Glucose Meter Misc Commonly known as:  TRUE METRIX GLUCOSE METER Use to test blood sugars daily. DX:  E11.49  
  
 fluticasone 50 mcg/actuation nasal spray Commonly known as:  FLONASE  
USE 2 SPRAYS IN EACH NOSTRIL DAILY  
  
 gabapentin 100 mg capsule Commonly known as:  NEURONTIN  
TAKE 1 CAPSULE BY MOUTH THREE TIMES DAILY  
  
 glucose blood VI test strips strip Commonly known as:  TRUE METRIX GLUCOSE TEST STRIP Test blood sugars daily. DX: E11.49 Lancets & Blood Glucose Strips Cmpk Element glucometer  
  
 lisinopril 10 mg tablet Commonly known as:  PRINIVIL, ZESTRIL  
TAKE 1 TABLET BY MOUTH DAILY  
  
 metFORMIN 500 mg tablet Commonly known as:  GLUCOPHAGE  
TAKE 1 TABLET BY MOUTH TWICE DAILY WITH MEAL  
  
 metoprolol tartrate 25 mg tablet Commonly known as:  LOPRESSOR  
TAKE 1/2 TABLET BY MOUTH TWICE DAILY MULTI-VITAMIN PO Take 1 tablet by mouth daily. sertraline 50 mg tablet Commonly known as:  ZOLOFT  
TAKE 1.5 TABLET BY MOUTH DAILY  
  
 simvastatin 40 mg tablet Commonly known as:  ZOCOR  
TAKE 1 TABLET BY MOUTH NIGHTLY THERALITH XR PO Take 2 tablets by mouth two (2) times a day. traZODone 50 mg tablet Commonly known as:  Mary Jaimescolo Take 1 Tab by mouth nightly. VITAMIN D3 1,000 unit Cap Generic drug:  cholecalciferol Take 2,000 Units by mouth daily. Indications: PREVENTION OF VITAMIN D DEFICIENCY  
  
 zolpidem 5 mg tablet Commonly known as:  AMBIEN Take 1 Tab by mouth nightly as needed. Prescriptions Sent to Pharmacy Refills  
 sertraline (ZOLOFT) 50 mg tablet 1 Sig: TAKE 1.5 TABLET BY MOUTH DAILY Class: Normal  
 Pharmacy: [facility], [address] [place] [address] [place] [address] #: [phone] Follow-up Instructions Return in about 3 months (around 7/7/2017) for Regular follow up. Patient Instructions Recovering From Depression: Care Instructions Your Care Instructions Taking good care of yourself is important as you recover from depression. In time, your symptoms will fade as your treatment takes hold. Do not give up. Instead, focus your energy on getting better. Your mood will improve. It just takes some time. Focus on things that can help you feel better, such as being with friends and family, eating well, and getting enough rest. But take things slowly. Do not do too much too soon. You will begin to feel better gradually. Follow-up care is a key part of your treatment and safety. Be sure to make and go to all appointments, and call your doctor if you are having problems. It's also a good idea to know your test results and keep a list of the medicines you take. How can you care for yourself at home? Be realistic · If you have a large task to do, break it up into smaller steps you can handle, and just do what you can. · You may want to put off important decisions until your depression has lifted. If you have plans that will have a major impact on your life, such as marriage, divorce, or a job change, try to wait a bit.  Talk it over with friends and loved ones who can help you look at the overall picture first. 
· Reaching out to people for help is important. Do not isolate yourself. Let your family and friends help you. Find someone you can trust and confide in, and talk to that person. · Be patient, and be kind to yourself. Remember that depression is not your fault and is not something you can overcome with willpower alone. Treatment is necessary for depression, just like for any other illness. Feeling better takes time, and your mood will improve little by little. Stay active · Stay busy and get outside. Take a walk, or try some other light exercise. · Talk with your doctor about an exercise program. Exercise can help with mild depression. · Go to a movie or concert. Take part in a Hindu activity or other social gathering. Go to a atokore game. · Ask a friend to have dinner with you. Take care of yourself · Eat a balanced diet with plenty of fresh fruits and vegetables, whole grains, and lean protein. If you have lost your appetite, eat small snacks rather than large meals. · Avoid drinking alcohol or using illegal drugs. Do not take medicines that have not been prescribed for you. They may interfere with medicines you may be taking for depression, or they may make your depression worse. · Take your medicines exactly as they are prescribed. You may start to feel better within 1 to 3 weeks of taking antidepressant medicine. But it can take as many as 6 to 8 weeks to see more improvement. If you have questions or concerns about your medicines, or if you do not notice any improvement by 3 weeks, talk to your doctor. · If you have any side effects from your medicine, tell your doctor. Antidepressants can make you feel tired, dizzy, or nervous. Some people have dry mouth, constipation, headaches, sexual problems, or diarrhea.  Many of these side effects are mild and will go away on their own after you have been taking the medicine for a few weeks. Some may last longer. Talk to your doctor if side effects are bothering you too much. You might be able to try a different medicine. · Get enough sleep. If you have problems sleeping: ¨ Go to bed at the same time every night, and get up at the same time every morning. ¨ Keep your bedroom dark and quiet. ¨ Do not exercise after 5:00 p.m. ¨ Avoid drinks with caffeine after 5:00 p.m. · Avoid sleeping pills unless they are prescribed by the doctor treating your depression. Sleeping pills may make you groggy during the day, and they may interact with other medicine you are taking. · If you have any other illnesses, such as diabetes, heart disease, or high blood pressure, make sure to continue with your treatment. Tell your doctor about all of the medicines you take, including those with or without a prescription. · Keep the numbers for these national suicide hotlines: 8-024-456-TALK (6-396.466.1932) and 4-423-KNFFQCE (1-522.160.4655). If you or someone you know talks about suicide or feeling hopeless, get help right away. When should you call for help? Call 911 anytime you think you may need emergency care. For example, call if: 
· You feel like hurting yourself or someone else. · Someone you know has depression and is about to attempt or is attempting suicide. Call your doctor now or seek immediate medical care if: 
· You hear voices. · Someone you know has depression and: 
¨ Starts to give away his or her possessions. ¨ Uses illegal drugs or drinks alcohol heavily. ¨ Talks or writes about death, including writing suicide notes or talking about guns, knives, or pills. ¨ Starts to spend a lot of time alone. ¨ Acts very aggressively or suddenly appears calm. Watch closely for changes in your health, and be sure to contact your doctor if: 
· You do not get better as expected. Where can you learn more? Go to http://jesús-jhoana.info/. Enter O871 in the search box to learn more about \"Recovering From Depression: Care Instructions. \" Current as of: July 26, 2016 Content Version: 11.2 © 7080-6766 [employer], [employer]. Care instructions adapted under license by [employer] (which disclaims liability or warranty for this information). If you have questions about a medical condition or this instruction, always ask your healthcare professional. [address] any warranty or liability for your use of this information. Introducing [facility] [place] [facility]! [facility] introduces Munch On Me patient portal. Now you can access parts of your medical record, email your doctor's office, and request medication refills online. 1. In your internet browser, go to [email or web address] 2. Click on the First Time User? Click Here link in the Sign In box. You will see the New Member Sign Up page. 3. Enter your Munch On Me Access Code exactly as it appears below. You will not need to use this code after youve completed the sign-up process. If you do not sign up before the expiration date, you must request a new code. · Munch On Me Access Code: [email or web address] Expires: 5/15/2017 10:35 AM 
 
4. Enter the last four digits of your Social Security Number (xxxx) and Date of Birth (mm/dd/yyyy) as indicated and click Submit. You will be taken to the next sign-up page. 5. Create a Munch On Me ID. This will be your Munch On Me login ID and cannot be changed, so think of one that is secure and easy to remember. 6. Create a Munch On Me password. You can change your password at any time. 7. Enter your Password Reset Question and Answer. This can be used at a later time if you forget your password. 8. Enter your e-mail address. You will receive e-mail notification when new information is available in [address] 9. Click Sign Up.  You can now view and download portions of your medical record. 10. Click the Download Summary menu link to download a portable copy of your medical information. If you have questions, please visit the Frequently Asked Questions section of the HistoPathway website. Remember, HistoPathway is NOT to be used for urgent needs. For medical emergencies, dial 911. Now available from your iPhone and Android! Please provide this summary of care documentation to your next provider. Your primary care clinician is listed as Jamal Mosher. If you have any questions after today's visit, please call [phone].

## 2017-04-07 NOTE — PATIENT INSTRUCTIONS
Recovering From Depression: Care Instructions  Your Care Instructions  Taking good care of yourself is important as you recover from depression. In time, your symptoms will fade as your treatment takes hold. Do not give up. Instead, focus your energy on getting better. Your mood will improve. It just takes some time. Focus on things that can help you feel better, such as being with friends and family, eating well, and getting enough rest. But take things slowly. Do not do too much too soon. You will begin to feel better gradually. Follow-up care is a key part of your treatment and safety. Be sure to make and go to all appointments, and call your doctor if you are having problems. It's also a good idea to know your test results and keep a list of the medicines you take. How can you care for yourself at home? Be realistic  · If you have a large task to do, break it up into smaller steps you can handle, and just do what you can. · You may want to put off important decisions until your depression has lifted. If you have plans that will have a major impact on your life, such as marriage, divorce, or a job change, try to wait a bit. Talk it over with friends and loved ones who can help you look at the overall picture first.  · Reaching out to people for help is important. Do not isolate yourself. Let your family and friends help you. Find someone you can trust and confide in, and talk to that person. · Be patient, and be kind to yourself. Remember that depression is not your fault and is not something you can overcome with willpower alone. Treatment is necessary for depression, just like for any other illness. Feeling better takes time, and your mood will improve little by little. Stay active  · Stay busy and get outside. Take a walk, or try some other light exercise. · Talk with your doctor about an exercise program. Exercise can help with mild depression. · Go to a movie or concert.  Take part in a Moravian activity or other social gathering. Go to a Glimpse.com game. · Ask a friend to have dinner with you. Take care of yourself  · Eat a balanced diet with plenty of fresh fruits and vegetables, whole grains, and lean protein. If you have lost your appetite, eat small snacks rather than large meals. · Avoid drinking alcohol or using illegal drugs. Do not take medicines that have not been prescribed for you. They may interfere with medicines you may be taking for depression, or they may make your depression worse. · Take your medicines exactly as they are prescribed. You may start to feel better within 1 to 3 weeks of taking antidepressant medicine. But it can take as many as 6 to 8 weeks to see more improvement. If you have questions or concerns about your medicines, or if you do not notice any improvement by 3 weeks, talk to your doctor. · If you have any side effects from your medicine, tell your doctor. Antidepressants can make you feel tired, dizzy, or nervous. Some people have dry mouth, constipation, headaches, sexual problems, or diarrhea. Many of these side effects are mild and will go away on their own after you have been taking the medicine for a few weeks. Some may last longer. Talk to your doctor if side effects are bothering you too much. You might be able to try a different medicine. · Get enough sleep. If you have problems sleeping:  ¨ Go to bed at the same time every night, and get up at the same time every morning. ¨ Keep your bedroom dark and quiet. ¨ Do not exercise after 5:00 p.m. ¨ Avoid drinks with caffeine after 5:00 p.m. · Avoid sleeping pills unless they are prescribed by the doctor treating your depression. Sleeping pills may make you groggy during the day, and they may interact with other medicine you are taking. · If you have any other illnesses, such as diabetes, heart disease, or high blood pressure, make sure to continue with your treatment.  Tell your doctor about all of the medicines you take, including those with or without a prescription. · Keep the numbers for these national suicide hotlines: 4-516-255-TALK (3-257.109.8656) and 7-977-QIHUJMB (0-552.999.4130). If you or someone you know talks about suicide or feeling hopeless, get help right away. When should you call for help? Call 911 anytime you think you may need emergency care. For example, call if:  · You feel like hurting yourself or someone else. · Someone you know has depression and is about to attempt or is attempting suicide. Call your doctor now or seek immediate medical care if:  · You hear voices. · Someone you know has depression and:  ¨ Starts to give away his or her possessions. ¨ Uses illegal drugs or drinks alcohol heavily. ¨ Talks or writes about death, including writing suicide notes or talking about guns, knives, or pills. ¨ Starts to spend a lot of time alone. ¨ Acts very aggressively or suddenly appears calm. Watch closely for changes in your health, and be sure to contact your doctor if:  · You do not get better as expected. Where can you learn more? Go to http://jesús-jhoana.info/. Enter N467 in the search box to learn more about \"Recovering From Depression: Care Instructions. \"  Current as of: July 26, 2016  Content Version: 11.2  © 4337-3346 [employer]. Care instructions adapted under license by [employer] (which disclaims liability or warranty for this information). If you have questions about a medical condition or this instruction, always ask your healthcare professional. [place] [address] any warranty or liability for your use of this information.

## 2017-04-07 NOTE — PROGRESS NOTES
Dev Lawrence is a 77 y.o. male who was seen in clinic today (4/7/2017). RTC with his wife today. Assessment & Plan:  Shanti Miles was seen today for depression. Diagnoses and all orders for this visit:    Moderate episode of recurrent major depressive disorder ([facility]- worsening, I spent 15 minutes with the patient and >50% of the time was spent counseling on treatment options & expectations. Will increase SSRI from 50mg to 75mg. Reviewed what to expect w/ Trazodone. Did review meds will only help so much. Need to sit down and talk to Nereyda Kati about his behavior. Reviewed presenting a united front regarding behavior. They verbalized understanding.    -     sertraline (ZOLOFT) 50 mg tablet; TAKE 1.5 TABLET BY MOUTH DAILY         Follow-up Disposition:  Return in about 3 months (around 7/7/2017) for Regular follow up.       ----------------------------------------------------------------------    Subjective:  Mental Health Review  Patient is seen for depression. Wife reports short tempered. Easily angered, mostly over nothing. He reports issues revolving around his grandson. He does little things to aggravate him. He is a 23 yr old acting like a 12year old (hitting him playful repeatedly or not letting him through a door). Ongoing depression symptoms include: depressed mood, insomnia. He denies any: anxiety or insomnia. Reports experiences the following side effects from the treatment: none. He reports sleep specialist is changing him from zolpidem to Trazadone 50mg due to insurance issues. Prior to Admission medications    Medication Sig Start Date End Date Taking? Authorizing Provider   simvastatin (ZOCOR) 40 mg tablet TAKE 1 TABLET BY MOUTH NIGHTLY 2/5/17  Yes Mer Rivera MD   metFORMIN (GLUCOPHAGE) 500 mg tablet TAKE 1 TABLET BY MOUTH TWICE DAILY WITH MEAL 2/3/17  Yes Mer Rivera MD   zolpidem (AMBIEN) 5 mg tablet Take 1 Tab by mouth nightly as needed.  1/6/17  Yes Historical Provider   lisinopril (PRINIVIL, ZESTRIL) 10 mg tablet TAKE 1 TABLET BY MOUTH DAILY 12/15/16  Yes Boy Ellis MD   gabapentin (NEURONTIN) 100 mg capsule TAKE 1 CAPSULE BY MOUTH THREE TIMES DAILY 12/15/16  Yes Boy Ellis MD   metoprolol tartrate (LOPRESSOR) 25 mg tablet TAKE 1/2 TABLET BY MOUTH TWICE DAILY 12/15/16  Yes Boy Ellis MD   sertraline (ZOLOFT) 50 mg tablet TAKE 1 TABLET BY MOUTH DAILY 11/16/16  Yes Boy Ellis MD   Blood-Glucose Meter (TRUE METRIX GLUCOSE METER) misc Use to test blood sugars daily. DX:  E11.49 11/10/16  Yes Boy Ellis MD   glucose blood VI test strips (TRUE METRIX GLUCOSE TEST STRIP) strip Test blood sugars daily. DX: E11.49 11/10/16  Yes Boy Ellis MD   balsalazide (COLAZAL) 750 mg capsule Take 2,250 mg by mouth two (2) times a day. Yes Historical Provider   fluticasone (FLONASE) 50 mcg/actuation nasal spray USE 2 SPRAYS IN EACH NOSTRIL DAILY  Patient taking differently: USE 2 SPRAYS IN EACH NOSTRIL DAILY as needed 6/14/16  Yes Boy Ellis MD   VIT B6/MAG CIT & OX/POTASS CIT (THERALITH XR PO) Take 2 tablets by mouth two (2) times a day. Yes Historical Provider   Lancets & Blood Glucose Strips Cmpk Element glucometer 2/26/13  Yes Leonora Coleman MD   aspirin 81 mg tablet Take 81 mg by mouth daily. Indications: MYOCARDIAL INFARCTION PREVENTION   Yes Historical Provider   MULTIVITAMINS WITH FLUORIDE (MULTI-VITAMIN PO) Take 1 tablet by mouth daily. Yes Historical Provider   Cholecalciferol, Vitamin D3, (VITAMIN D3) 1,000 unit Cap Take 2,000 Units by mouth daily.  Indications: PREVENTION OF VITAMIN D DEFICIENCY   Yes Historical Provider          Allergies   Allergen Reactions    Apriso [Mesalamine] Diarrhea    Prednisone Other (comments)     constipation    Sulfasalazine Hives           ROS- deferred      Objective:   Physical Exam- well groomed, good eye contact      Visit Vitals    /74    Pulse 84  Temp 98.2 °F (36.8 °C) (Oral)    Resp 14    Ht 6' 0.1\" (1.831 m)    Wt 225 lb (102.1 kg)    SpO2 98%    BMI 30.43 kg/m2         Disclaimer:  Advised him to call back or return to office if symptoms worsen/change/persist.  Discussed expected course/resolution/complications of diagnosis in detail with patient. Medication risks/benefits/costs/interactions/alternatives discussed with patient. He was given an after visit summary which includes diagnoses, current medications, & vitals. He expressed understanding with the diagnosis and plan.         Mer Rivera MD

## 2017-06-10 DIAGNOSIS — E11.49 DM (DIABETES MELLITUS) TYPE II CONTROLLED, NEUROLOGICAL MANIFESTATION (HCC): ICD-10-CM

## 2017-06-10 DIAGNOSIS — I25.10 CORONARY ARTERY DISEASE INVOLVING NATIVE CORONARY ARTERY WITHOUT ANGINA PECTORIS, UNSPECIFIED WHETHER NATIVE OR TRANSPLANTED HEART: ICD-10-CM

## 2017-06-11 RX ORDER — GABAPENTIN 100 MG/1
CAPSULE ORAL
Qty: 270 CAP | Refills: 1 | Status: SHIPPED | OUTPATIENT
Start: 2017-06-11 | End: 2017-07-06 | Stop reason: SDUPTHER

## 2017-06-11 RX ORDER — METOPROLOL TARTRATE 25 MG/1
TABLET, FILM COATED ORAL
Qty: 90 TAB | Refills: 1 | Status: SHIPPED | OUTPATIENT
Start: 2017-06-11 | End: 2017-07-06 | Stop reason: SDUPTHER

## 2017-06-11 RX ORDER — LISINOPRIL 10 MG/1
TABLET ORAL
Qty: 90 TAB | Refills: 1 | Status: SHIPPED | OUTPATIENT
Start: 2017-06-11 | End: 2017-07-06 | Stop reason: SDUPTHER

## 2017-06-26 ENCOUNTER — TELEPHONE (OUTPATIENT)
Dept: INTERNAL MEDICINE CLINIC | Age: 77
End: 2017-06-26

## 2017-06-26 DIAGNOSIS — E11.49 CONTROLLED TYPE 2 DIABETES MELLITUS WITH OTHER NEUROLOGIC COMPLICATION, WITHOUT LONG-TERM CURRENT USE OF INSULIN (HCC): Primary | ICD-10-CM

## 2017-06-28 ENCOUNTER — HOSPITAL ENCOUNTER (OUTPATIENT)
Dept: LAB | Age: 77
Discharge: HOME | End: 2017-06-28
Payer: MEDICARE

## 2017-06-28 PROCEDURE — 80053 COMPREHEN METABOLIC PANEL: CPT

## 2017-06-28 PROCEDURE — 83036 HEMOGLOBIN GLYCOSYLATED A1C: CPT

## 2017-06-29 LAB
ALBUMIN SERPL-MCNC: 4.5 G/DL (ref 3.5–4.8)
ALBUMIN/GLOB SERPL: 1.9 {RATIO} (ref 1.2–2.2)
ALP SERPL-CCNC: 70 IU/L (ref 39–117)
ALT SERPL-CCNC: 27 IU/L (ref 0–44)
AST SERPL-CCNC: 31 IU/L (ref 0–40)
BILIRUB SERPL-MCNC: 0.4 MG/DL (ref 0–1.2)
BUN SERPL-MCNC: 9 MG/DL (ref 8–27)
BUN/CREAT SERPL: 8 (ref 10–24)
CALCIUM SERPL-MCNC: 9.9 MG/DL (ref 8.6–10.2)
CHLORIDE SERPL-SCNC: 98 MMOL/L (ref 96–106)
CO2 SERPL-SCNC: 22 MMOL/L (ref 18–29)
CREAT SERPL-MCNC: 1.1 MG/DL (ref 0.76–1.27)
EST. AVERAGE GLUCOSE BLD GHB EST-MCNC: 143 MG/DL
GLOBULIN SER CALC-MCNC: 2.4 G/DL (ref 1.5–4.5)
GLUCOSE SERPL-MCNC: 131 MG/DL (ref 65–99)
HBA1C MFR BLD: 6.6 % (ref 4.8–5.6)
POTASSIUM SERPL-SCNC: 4.6 MMOL/L (ref 3.5–5.2)
PROT SERPL-MCNC: 6.9 G/DL (ref 6–8.5)
SODIUM SERPL-SCNC: 141 MMOL/L (ref 134–144)

## 2017-07-06 ENCOUNTER — OFFICE VISIT (OUTPATIENT)
Dept: INTERNAL MEDICINE CLINIC | Age: 77
End: 2017-07-06

## 2017-07-06 VITALS
HEART RATE: 60 BPM | DIASTOLIC BLOOD PRESSURE: 68 MMHG | OXYGEN SATURATION: 97 % | SYSTOLIC BLOOD PRESSURE: 102 MMHG | TEMPERATURE: 97.9 F | BODY MASS INDEX: 30.61 KG/M2 | WEIGHT: 226 LBS | HEIGHT: 72 IN | RESPIRATION RATE: 16 BRPM

## 2017-07-06 DIAGNOSIS — I25.10 CORONARY ARTERY DISEASE INVOLVING NATIVE CORONARY ARTERY WITHOUT ANGINA PECTORIS, UNSPECIFIED WHETHER NATIVE OR TRANSPLANTED HEART: ICD-10-CM

## 2017-07-06 DIAGNOSIS — E11.40 CONTROLLED TYPE 2 DIABETES MELLITUS WITH DIABETIC NEUROPATHY, UNSPECIFIED LONG TERM INSULIN USE STATUS: Primary | ICD-10-CM

## 2017-07-06 DIAGNOSIS — E78.5 HYPERLIPIDEMIA, UNSPECIFIED HYPERLIPIDEMIA TYPE: ICD-10-CM

## 2017-07-06 DIAGNOSIS — F33.1 MODERATE EPISODE OF RECURRENT MAJOR DEPRESSIVE DISORDER (HCC): ICD-10-CM

## 2017-07-06 RX ORDER — SERTRALINE HYDROCHLORIDE 100 MG/1
TABLET, FILM COATED ORAL
Qty: 90 TAB | Refills: 1 | Status: SHIPPED | OUTPATIENT
Start: 2017-07-06 | End: 2017-09-26

## 2017-07-06 RX ORDER — GABAPENTIN 300 MG/1
CAPSULE ORAL
Qty: 90 CAP | Refills: 1 | Status: SHIPPED | OUTPATIENT
Start: 2017-07-06 | End: 2017-09-26

## 2017-07-06 RX ORDER — METOPROLOL TARTRATE 25 MG/1
TABLET, FILM COATED ORAL
Qty: 90 TAB | Refills: 1 | Status: SHIPPED | OUTPATIENT
Start: 2017-07-06 | End: 2017-12-04 | Stop reason: SDUPTHER

## 2017-07-06 RX ORDER — TRAZODONE HYDROCHLORIDE 50 MG/1
50 TABLET ORAL
Qty: 90 TAB | Refills: 1 | Status: SHIPPED | OUTPATIENT
Start: 2017-07-06 | End: 2017-09-26

## 2017-07-06 RX ORDER — METFORMIN HYDROCHLORIDE 500 MG/1
TABLET ORAL
Qty: 270 TAB | Refills: 1 | Status: SHIPPED | OUTPATIENT
Start: 2017-07-06 | End: 2017-09-26

## 2017-07-06 RX ORDER — SIMVASTATIN 40 MG/1
TABLET, FILM COATED ORAL
Qty: 90 TAB | Refills: 1 | Status: SHIPPED | OUTPATIENT
Start: 2017-07-06 | End: 2017-10-29 | Stop reason: SDUPTHER

## 2017-07-06 RX ORDER — LISINOPRIL 10 MG/1
TABLET ORAL
Qty: 90 TAB | Refills: 1 | Status: SHIPPED | OUTPATIENT
Start: 2017-07-06 | End: 2017-09-26

## 2017-07-06 NOTE — MR AVS SNAPSHOT
Visit Information Date & Time Provider Department Dept. Phone Encounter #  
 7/6/2017  7:45 AM Isa Lawton, [address] [facility] [phone] [id number] Upcoming Health Maintenance Date Due  
 EYE EXAM RETINAL OR DILATED Q1 6/8/2017 FOOT EXAM Q1 6/10/2017 INFLUENZA AGE 9 TO ADULT 8/1/2017 LIPID PANEL Q1 11/14/2017 HEMOGLOBIN A1C Q6M 12/28/2017 MICROALBUMIN Q1 2/7/2018 MEDICARE YEARLY EXAM 2/15/2018 GLAUCOMA SCREENING Q2Y 6/9/2018 COLONOSCOPY 6/15/2018 DTaP/Tdap/Td series (2 - Td) 10/22/2022 Allergies as of 7/6/2017  Review Complete On: 7/6/2017 By: Isa Lawton MD  
  
 Severity Noted Reaction Type Reactions Apriso [Mesalamine]  10/25/2016    Diarrhea Prednisone  10/19/2015    Other (comments)  
 constipation Sulfasalazine  10/25/2016    Hives Current Immunizations  Reviewed on 7/6/2017 Name Date Influenza High Dose Vaccine PF 9/25/2016, 10/17/2015, 10/1/2014 Influenza Vaccine 9/16/2012 Pneumococcal Conjugate (PCV-13) 7/12/2015 Pneumococcal Polysaccharide (PPSV-23) 9/25/2016 Pneumococcal Vaccine (Unspecified Type) 10/22/2012 TDAP Vaccine 10/22/2012 Zoster Vaccine, Live 3/11/2015 Reviewed by Kervin Serrato RN on 7/6/2017 at  7:41 AM  
You Were Diagnosed With   
  
 Codes Comments Controlled type 2 diabetes mellitus with diabetic neuropathy, unspecified long term insulin use status    -  Primary ICD-10-CM: E11.40 ICD-9-CM: 250.60, 357.2 Hyperlipidemia, unspecified hyperlipidemia type     ICD-10-CM: E78.5 ICD-9-CM: 272.4 Coronary artery disease involving native coronary artery without angina pectoris, unspecified whether native or transplanted heart     ICD-10-CM: I25.10 ICD-9-CM: 414.01 Moderate episode of recurrent major depressive disorder (HCC)     ICD-10-CM: F33.1 ICD-9-CM: 296.32 Vitals BP Pulse Temp Resp Height(growth percentile) Weight(growth percentile) 102/68 60 97.9 °F (36.6 °C) (Oral) 16 6' 0.1\" (1.831 m) 226 lb (102.5 kg) SpO2 BMI Smoking Status 97% 30.57 kg/m2 Former Smoker BMI and BSA Data Body Mass Index Body Surface Area 30.57 kg/m 2 2.28 m 2 Preferred Pharmacy Pharmacy Name Phone [facility] [address] [place] [address], [address] [place] [address] [phone] Your Updated Medication List  
  
   
This list is accurate as of: 7/6/17  8:15 AM.  Always use your most recent med list.  
  
  
  
  
 aspirin 81 mg tablet Take 81 mg by mouth daily. Indications: MYOCARDIAL INFARCTION PREVENTION  
  
 balsalazide 750 mg capsule Commonly known as:  Virgia Estrada Take 2,250 mg by mouth two (2) times a day. Blood-Glucose Meter Misc Commonly known as:  TRUE METRIX GLUCOSE METER Use to test blood sugars daily. DX:  E11.49  
  
 fluticasone 50 mcg/actuation nasal spray Commonly known as:  FLONASE  
USE 2 SPRAYS IN EACH NOSTRIL DAILY  
  
 gabapentin 300 mg capsule Commonly known as:  NEURONTIN  
TAKE 1 CAPSULE BY MOUTH THREE TIMES DAILY  
  
 glucose blood VI test strips strip Commonly known as:  TRUE METRIX GLUCOSE TEST STRIP Test blood sugars daily. DX: E11.49 Lancets & Blood Glucose Strips Cmpk Element glucometer  
  
 lisinopril 10 mg tablet Commonly known as:  PRINIVIL, ZESTRIL  
TAKE 1 TABLET BY MOUTH DAILY  
  
 metFORMIN 500 mg tablet Commonly known as:  GLUCOPHAGE  
TAKE 1 TABLET IN THE MORNING AND 2 TABLETS IN THE EVENING  
  
 metoprolol tartrate 25 mg tablet Commonly known as:  LOPRESSOR  
TAKE 1/2 TABLET BY MOUTH TWICE DAILY MULTI-VITAMIN PO Take 1 tablet by mouth daily. sertraline 100 mg tablet Commonly known as:  ZOLOFT  
TAKE 1 TABLET BY MOUTH DAILY  
  
 simvastatin 40 mg tablet Commonly known as:  ZOCOR  
TAKE 1 TABLET BY MOUTH NIGHTLY THERALITH XR PO Take 2 tablets by mouth two (2) times a day. traZODone 50 mg tablet Commonly known as:  Ortiz Ort Take 1 Tab by mouth nightly. VITAMIN D3 1,000 unit Cap Generic drug:  cholecalciferol Take 2,000 Units by mouth daily. Indications: PREVENTION OF VITAMIN D DEFICIENCY Prescriptions Printed Refills  
 simvastatin (ZOCOR) 40 mg tablet 1 Sig: TAKE 1 TABLET BY MOUTH NIGHTLY Class: Print  
 metFORMIN (GLUCOPHAGE) 500 mg tablet 1 Sig: TAKE 1 TABLET IN THE MORNING AND 2 TABLETS IN THE EVENING Class: Print  
 lisinopril (PRINIVIL, ZESTRIL) 10 mg tablet 1 Sig: TAKE 1 TABLET BY MOUTH DAILY Class: Print  
 gabapentin (NEURONTIN) 300 mg capsule 1 Sig: TAKE 1 CAPSULE BY MOUTH THREE TIMES DAILY Class: Print  
 metoprolol tartrate (LOPRESSOR) 25 mg tablet 1 Sig: TAKE 1/2 TABLET BY MOUTH TWICE DAILY Class: Print  
 sertraline (ZOLOFT) 100 mg tablet 1 Sig: TAKE 1 TABLET BY MOUTH DAILY Class: Print  
 traZODone (DESYREL) 50 mg tablet 1 Sig: Take 1 Tab by mouth nightly. Class: Print Route: Oral  
  
We Performed the Following  DIABETES FOOT EXAM [7 Custom] Introducing [facility] & HEALTH SERVICES! [facility] introduces Real Intent patient portal. Now you can access parts of your medical record, email your doctor's office, and request medication refills online. 1. In your internet browser, go to [email or web address] 2. Click on the First Time User? Click Here link in the Sign In box. You will see the New Member Sign Up page. 3. Enter your Real Intent Access Code exactly as it appears below. You will not need to use this code after youve completed the sign-up process. If you do not sign up before the expiration date, you must request a new code. · Real Intent Access Code: [email or web address] Expires: 10/4/2017  8:15 AM 
 
4. Enter the last four digits of your Social Security Number (xxxx) and Date of Birth (mm/dd/yyyy) as indicated and click Submit.  You will be taken to the next sign-up page. 5. Create a setObject ID. This will be your setObject login ID and cannot be changed, so think of one that is secure and easy to remember. 6. Create a setObject password. You can change your password at any time. 7. Enter your Password Reset Question and Answer. This can be used at a later time if you forget your password. 8. Enter your e-mail address. You will receive e-mail notification when new information is available in [address] 9. Click Sign Up. You can now view and download portions of your medical record. 10. Click the Download Summary menu link to download a portable copy of your medical information. If you have questions, please visit the Frequently Asked Questions section of the setObject website. Remember, setObject is NOT to be used for urgent needs. For medical emergencies, dial 911. Now available from your iPhone and Android! Please provide this summary of care documentation to your next provider. Your primary care clinician is listed as Sada Gaytan. If you have any questions after today's visit, please call [phone].

## 2017-08-14 ENCOUNTER — OFFICE VISIT (OUTPATIENT)
Dept: INTERNAL MEDICINE CLINIC | Age: 77
End: 2017-08-14

## 2017-08-14 VITALS
HEART RATE: 88 BPM | OXYGEN SATURATION: 95 % | HEIGHT: 72 IN | BODY MASS INDEX: 30.75 KG/M2 | SYSTOLIC BLOOD PRESSURE: 100 MMHG | WEIGHT: 227 LBS | RESPIRATION RATE: 19 BRPM | DIASTOLIC BLOOD PRESSURE: 79 MMHG | TEMPERATURE: 98.1 F

## 2017-08-14 DIAGNOSIS — L50.9 HIVES OF UNKNOWN ORIGIN: Primary | ICD-10-CM

## 2017-08-14 NOTE — PROGRESS NOTES
Sertraline was increased to 100mg from 75mg; pt noticed an outbreak of hives that come and goes since then. Pt feels as if the medication is causing this per side effects review.

## 2017-08-14 NOTE — MR AVS SNAPSHOT
Visit Information Date & Time Provider Department Dept. Phone Encounter #  
 8/14/2017  4:00 PM [facility] [phone] [id number] Follow-up Instructions Return if symptoms worsen or fail to improve. Your Appointments 2/20/2018 10:30 AM  
Medicare Physical with Parth Santos MD  
[facility]) Appt Note: wellness [address] [facility] [address]  
[address] [address] [address] [place] Upcoming Health Maintenance Date Due INFLUENZA AGE 9 TO ADULT 8/1/2017 LIPID PANEL Q1 11/14/2017 HEMOGLOBIN A1C Q6M 12/28/2017 MICROALBUMIN Q1 2/7/2018 MEDICARE YEARLY EXAM 2/15/2018 EYE EXAM RETINAL OR DILATED Q1 6/14/2018 COLONOSCOPY 6/15/2018 FOOT EXAM Q1 7/6/2018 GLAUCOMA SCREENING Q2Y 6/14/2019 DTaP/Tdap/Td series (2 - Td) 10/22/2022 Allergies as of 8/14/2017  Review Complete On: 8/14/2017 By: Marilia Pressley MD  
  
 Severity Noted Reaction Type Reactions Apriso [Mesalamine]  10/25/2016    Diarrhea Prednisone  10/19/2015    Other (comments)  
 constipation Sulfasalazine  10/25/2016    Hives Current Immunizations  Reviewed on 7/6/2017 Name Date Influenza High Dose Vaccine PF 9/25/2016, 10/17/2015, 10/1/2014 Influenza Vaccine 9/16/2012 Pneumococcal Conjugate (PCV-13) 7/12/2015 Pneumococcal Polysaccharide (PPSV-23) 9/25/2016 TDAP Vaccine 10/22/2012 ZZZ-RETIRED (DO NOT USE) Pneumococcal Vaccine (Unspecified Type) 10/22/2012 Zoster Vaccine, Live 3/11/2015 Not reviewed this visit You Were Diagnosed With   
  
 Codes Comments Hives of unknown origin    -  Primary ICD-10-CM: L50.9 ICD-9-CM: 708. 9 Vitals BP Pulse Temp Resp Height(growth percentile) Weight(growth percentile)  100/79 (BP 1 Location: Right arm, BP Patient Position: Sitting) 88 98.1 °F (36.7 °C) (Oral) 19 6' (1.829 m) 227 lb (103 kg) SpO2 BMI Smoking Status 95% 30.79 kg/m2 Former Smoker BMI and BSA Data Body Mass Index Body Surface Area 30.79 kg/m 2 2.29 m 2 Preferred Pharmacy Pharmacy Name Phone [facility] [address] [facility], [facility] [phone] Your Updated Medication List  
  
   
This list is accurate as of: 8/14/17  4:45 PM.  Always use your most recent med list.  
  
  
  
  
 aspirin 81 mg tablet Take 81 mg by mouth daily. Indications: MYOCARDIAL INFARCTION PREVENTION  
  
 balsalazide 750 mg capsule Commonly known as:  Halley Deter Take 2,250 mg by mouth two (2) times a day. Blood-Glucose Meter Misc Commonly known as:  TRUE METRIX GLUCOSE METER Use to test blood sugars daily. DX:  E11.49  
  
 fluticasone 50 mcg/actuation nasal spray Commonly known as:  FLONASE  
USE 2 SPRAYS IN EACH NOSTRIL DAILY  
  
 gabapentin 300 mg capsule Commonly known as:  NEURONTIN  
TAKE 1 CAPSULE BY MOUTH THREE TIMES DAILY  
  
 glucose blood VI test strips strip Commonly known as:  TRUE METRIX GLUCOSE TEST STRIP Test blood sugars daily. DX: E11.49 Lancets & Blood Glucose Strips Cmpk Element glucometer  
  
 lisinopril 10 mg tablet Commonly known as:  PRINIVIL, ZESTRIL  
TAKE 1 TABLET BY MOUTH DAILY  
  
 metFORMIN 500 mg tablet Commonly known as:  GLUCOPHAGE  
TAKE 1 TABLET IN THE MORNING AND 2 TABLETS IN THE EVENING  
  
 metoprolol tartrate 25 mg tablet Commonly known as:  LOPRESSOR  
TAKE 1/2 TABLET BY MOUTH TWICE DAILY MULTI-VITAMIN PO Take 1 tablet by mouth daily. sertraline 100 mg tablet Commonly known as:  ZOLOFT  
TAKE 1 TABLET BY MOUTH DAILY  
  
 simvastatin 40 mg tablet Commonly known as:  ZOCOR  
TAKE 1 TABLET BY MOUTH NIGHTLY THERALITH XR PO Take 2 tablets by mouth two (2) times a day. traZODone 50 mg tablet Commonly known as:  Marian Hand Take 1 Tab by mouth nightly. VITAMIN D3 1,000 unit Cap Generic drug:  cholecalciferol Take 2,000 Units by mouth daily. Indications: PREVENTION OF VITAMIN D DEFICIENCY Follow-up Instructions Return if symptoms worsen or fail to improve. Patient Instructions Please decrease Zoloft back to 75mg. Discuss further plan for mood with Dr. Kevin Real. Introducing [facility] & HEALTH SERVICES! [employer] introduces Tizra patient portal. Now you can access parts of your medical record, email your doctor's office, and request medication refills online. 1. In your internet browser, go to [email or web address] 2. Click on the First Time User? Click Here link in the Sign In box. You will see the New Member Sign Up page. 3. Enter your Tizra Access Code exactly as it appears below. You will not need to use this code after youve completed the sign-up process. If you do not sign up before the expiration date, you must request a new code. · Tizra Access Code: [email or web address] Expires: 10/4/2017  8:15 AM 
 
4. Enter the last four digits of your Social Security Number (xxxx) and Date of Birth (mm/dd/yyyy) as indicated and click Submit. You will be taken to the next sign-up page. 5. Create a Tizra ID. This will be your Tizra login ID and cannot be changed, so think of one that is secure and easy to remember. 6. Create a Tizra password. You can change your password at any time. 7. Enter your Password Reset Question and Answer. This can be used at a later time if you forget your password. 8. Enter your e-mail address. You will receive e-mail notification when new information is available in [address] 9. Click Sign Up. You can now view and download portions of your medical record. 10. Click the Download Summary menu link to download a portable copy of your medical information. If you have questions, please visit the Frequently Asked Questions section of the Cii[employer]Whart website. Remember, [employer] is NOT to be used for urgent needs. For medical emergencies, dial 911. Now available from your iPhone and Android! Please provide this summary of care documentation to your next provider. Your primary care clinician is listed as Urmila Escobedo. If you have any questions after today's visit, please call [phone].

## 2017-08-14 NOTE — PROGRESS NOTES
Acute Care Note    Martha Breen is 77 y.o. male. he presents for evaluation of Medication Reaction and Hives    The patient presents for a discussion of hives which he notes have been progressive and off/on for the past 2 weeks. He notes that he has been on Zoloft and that recently he has had this medication increased for increased symptoms of anxiety noted by his wife. Since being on the new medication (100mg of Zoloft) he has noted hives which have moved around and been present since the change of medication. He has been treating this with Cortisone 10 with some mild improvement in symptoms. He denies exposure to any new soaps or plants. Hives seem to be \"moving around\". Prior to Admission medications    Medication Sig Start Date End Date Taking? Authorizing Provider   simvastatin (ZOCOR) 40 mg tablet TAKE 1 TABLET BY MOUTH NIGHTLY 7/6/17  Yes Romana Cypher, MD   metFORMIN (GLUCOPHAGE) 500 mg tablet TAKE 1 TABLET IN THE MORNING AND 2 TABLETS IN THE EVENING 7/6/17  Yes Romana Cypher, MD   lisinopril (PRINIVIL, ZESTRIL) 10 mg tablet TAKE 1 TABLET BY MOUTH DAILY 7/6/17  Yes Romana Cypher, MD   gabapentin (NEURONTIN) 300 mg capsule TAKE 1 CAPSULE BY MOUTH THREE TIMES DAILY 7/6/17  Yes Romana Cypher, MD   metoprolol tartrate (LOPRESSOR) 25 mg tablet TAKE 1/2 TABLET BY MOUTH TWICE DAILY 7/6/17  Yes Romana Cypher, MD   sertraline (ZOLOFT) 100 mg tablet TAKE 1 TABLET BY MOUTH DAILY 7/6/17  Yes Romana Cypher, MD   traZODone (DESYREL) 50 mg tablet Take 1 Tab by mouth nightly. 7/6/17  Yes Romana Cypher, MD   Blood-Glucose Meter (TRUE METRIX GLUCOSE METER) misc Use to test blood sugars daily. DX:  E11.49 11/10/16  Yes Romana Cypher, MD   glucose blood VI test strips (TRUE METRIX GLUCOSE TEST STRIP) strip Test blood sugars daily.   DX: E11.49 11/10/16  Yes Romana Cypher, MD   balsalazide (COLAZAL) 750 mg capsule Take 2,250 mg by mouth two (2) times a day.   Yes Historical Provider   fluticasone (FLONASE) 50 mcg/actuation nasal spray USE 2 SPRAYS IN EACH NOSTRIL DAILY  Patient taking differently: USE 2 SPRAYS IN EACH NOSTRIL DAILY as needed 6/14/16  Yes Jordyn David MD   VIT B6/MAG CIT & OX/POTASS CIT (THERALITH XR PO) Take 2 tablets by mouth two (2) times a day. Yes Historical Provider   Lancets & Blood Glucose Strips Cmpk Element glucometer 2/26/13  Yes Elder Wallace MD   aspirin 81 mg tablet Take 81 mg by mouth daily. Indications: MYOCARDIAL INFARCTION PREVENTION   Yes Historical Provider   MULTIVITAMINS WITH FLUORIDE (MULTI-VITAMIN PO) Take 1 tablet by mouth daily. Yes Historical Provider   Cholecalciferol, Vitamin D3, (VITAMIN D3) 1,000 unit Cap Take 2,000 Units by mouth daily. Indications: PREVENTION OF VITAMIN D DEFICIENCY   Yes Historical Provider         Patient Active Problem List   Diagnosis Code    Essential hypertension, benign I10    CAD (coronary artery disease) I25.10    DM (diabetes mellitus) type II controlled, neurological manifestation ([facility] E11.49    Pure hypercholesterolemia E78.00    Depression F32.9    Chronic back pain M54.9, G89.29    UC (ulcerative colitis) ([facility] K51.90    Pelvic kidney Q63.2    Scoliosis M41.9    Kidney stone N20.0    CARROL (obstructive sleep apnea) G47.33         Review of Systems   Constitutional: Negative. Respiratory: Negative. Cardiovascular: Negative. Gastrointestinal: Negative. Skin: Positive for rash. Visit Vitals    /79 (BP 1 Location: Right arm, BP Patient Position: Sitting)    Pulse 88    Temp 98.1 °F (36.7 °C) (Oral)    Resp 19    Ht 6' (1.829 m)    Wt 227 lb (103 kg)    SpO2 95%    BMI 30.79 kg/m2       Physical Exam   Skin:   Several discrete areas of slightly raised erythematous macular rash. Noted on the right arm, left leg and shin. ASSESSMENT/PLAN  Diagnoses and all orders for this visit:    1.  Hives of unknown origin- Discussed that the hives are potentially due to his increase in Zoloft. For now, advised that he revert back to his dosage of 75mg of Zoloft. Monitor for rash after this change. He will also discuss further treatment of his anxiety if necessary with his PCP. Advised the patient to call back or return to office if symptoms worsen/change/persist.   Discussed expected course/resolution/complications of diagnosis in detail with patient. Medication risks/benefits/costs/interactions/alternatives discussed with patient. The patient was given an after visit summary which includes diagnoses, current medications, & vitals. They expressed understanding with the diagnosis and plan.

## 2017-09-26 RX ORDER — CHOLECALCIFEROL (VITAMIN D3) 125 MCG
5 CAPSULE ORAL
Status: ON HOLD | COMMUNITY
End: 2020-11-09

## 2017-09-26 RX ORDER — GABAPENTIN 100 MG/1
100 CAPSULE ORAL
COMMUNITY
End: 2017-12-04 | Stop reason: SDUPTHER

## 2017-09-26 RX ORDER — CHOLECALCIFEROL (VITAMIN D3) 125 MCG
2000 CAPSULE ORAL DAILY
COMMUNITY
End: 2021-09-15 | Stop reason: SDUPTHER

## 2017-09-26 RX ORDER — SERTRALINE HYDROCHLORIDE 50 MG/1
75 TABLET, FILM COATED ORAL DAILY
COMMUNITY
End: 2017-10-01 | Stop reason: SDUPTHER

## 2017-09-26 RX ORDER — METFORMIN HYDROCHLORIDE 500 MG/1
1000 TABLET ORAL EVERY EVENING
COMMUNITY
End: 2018-01-04 | Stop reason: SDUPTHER

## 2017-09-26 RX ORDER — METFORMIN HYDROCHLORIDE 500 MG/1
500 TABLET ORAL DAILY
COMMUNITY
End: 2018-01-04 | Stop reason: SDUPTHER

## 2017-09-26 RX ORDER — LISINOPRIL 10 MG/1
10 TABLET ORAL
COMMUNITY
End: 2018-01-04 | Stop reason: SDUPTHER

## 2017-09-27 ENCOUNTER — ANESTHESIA EVENT (OUTPATIENT)
Dept: ENDOSCOPY | Age: 77
End: 2017-09-27
Payer: MEDICARE

## 2017-09-27 ENCOUNTER — HOSPITAL ENCOUNTER (OUTPATIENT)
Age: 77
Setting detail: OUTPATIENT SURGERY
Discharge: HOME | End: 2017-09-27
Attending: SPECIALIST | Admitting: SPECIALIST
Payer: MEDICARE

## 2017-09-27 ENCOUNTER — ANESTHESIA (OUTPATIENT)
Dept: ENDOSCOPY | Age: 77
End: 2017-09-27
Payer: MEDICARE

## 2017-09-27 VITALS
RESPIRATION RATE: 15 BRPM | BODY MASS INDEX: 29.95 KG/M2 | HEART RATE: 59 BPM | TEMPERATURE: 98.8 F | HEIGHT: 73 IN | WEIGHT: 226 LBS | DIASTOLIC BLOOD PRESSURE: 75 MMHG | OXYGEN SATURATION: 98 % | SYSTOLIC BLOOD PRESSURE: 127 MMHG

## 2017-09-27 LAB
GLUCOSE BLD STRIP.AUTO-MCNC: 151 MG/DL (ref 65–100)
SERVICE CMNT-IMP: ABNORMAL

## 2017-09-27 PROCEDURE — 74011250637 HC RX REV CODE- 250/637: Performed by: SPECIALIST

## 2017-09-27 PROCEDURE — 76040000019: Performed by: SPECIALIST

## 2017-09-27 PROCEDURE — 88305 TISSUE EXAM BY PATHOLOGIST: CPT | Performed by: SPECIALIST

## 2017-09-27 PROCEDURE — 74011250636 HC RX REV CODE- 250/636

## 2017-09-27 PROCEDURE — 76060000031 HC ANESTHESIA FIRST 0.5 HR: Performed by: SPECIALIST

## 2017-09-27 PROCEDURE — 77030009426 HC FCPS BIOP ENDOSC BSC -B: Performed by: SPECIALIST

## 2017-09-27 PROCEDURE — 82962 GLUCOSE BLOOD TEST: CPT

## 2017-09-27 PROCEDURE — 77030027957 HC TBNG IRR ENDOGTR BUSS -B: Performed by: SPECIALIST

## 2017-09-27 RX ORDER — MIDAZOLAM HYDROCHLORIDE 1 MG/ML
.25-1 INJECTION, SOLUTION INTRAMUSCULAR; INTRAVENOUS
Status: DISCONTINUED | OUTPATIENT
Start: 2017-09-27 | End: 2017-09-27 | Stop reason: HOSPADM

## 2017-09-27 RX ORDER — DEXTROMETHORPHAN/PSEUDOEPHED 2.5-7.5/.8
1.2 DROPS ORAL
Status: DISCONTINUED | OUTPATIENT
Start: 2017-09-27 | End: 2017-09-27 | Stop reason: HOSPADM

## 2017-09-27 RX ORDER — FLUMAZENIL 0.1 MG/ML
0.2 INJECTION INTRAVENOUS
Status: DISCONTINUED | OUTPATIENT
Start: 2017-09-27 | End: 2017-09-27 | Stop reason: HOSPADM

## 2017-09-27 RX ORDER — FENTANYL CITRATE 50 UG/ML
200 INJECTION, SOLUTION INTRAMUSCULAR; INTRAVENOUS
Status: DISCONTINUED | OUTPATIENT
Start: 2017-09-27 | End: 2017-09-27 | Stop reason: HOSPADM

## 2017-09-27 RX ORDER — EPINEPHRINE 0.1 MG/ML
1 INJECTION INTRACARDIAC; INTRAVENOUS
Status: DISCONTINUED | OUTPATIENT
Start: 2017-09-27 | End: 2017-09-27 | Stop reason: HOSPADM

## 2017-09-27 RX ORDER — SODIUM CHLORIDE 0.9 % (FLUSH) 0.9 %
5-10 SYRINGE (ML) INJECTION AS NEEDED
Status: DISCONTINUED | OUTPATIENT
Start: 2017-09-27 | End: 2017-09-27 | Stop reason: HOSPADM

## 2017-09-27 RX ORDER — SODIUM CHLORIDE 9 MG/ML
50 INJECTION, SOLUTION INTRAVENOUS CONTINUOUS
Status: DISCONTINUED | OUTPATIENT
Start: 2017-09-27 | End: 2017-09-27 | Stop reason: HOSPADM

## 2017-09-27 RX ORDER — LORAZEPAM 2 MG/ML
2 INJECTION INTRAMUSCULAR AS NEEDED
Status: DISCONTINUED | OUTPATIENT
Start: 2017-09-27 | End: 2017-09-27 | Stop reason: HOSPADM

## 2017-09-27 RX ORDER — SODIUM CHLORIDE 9 MG/ML
INJECTION, SOLUTION INTRAVENOUS
Status: DISCONTINUED | OUTPATIENT
Start: 2017-09-27 | End: 2017-09-27 | Stop reason: HOSPADM

## 2017-09-27 RX ORDER — NALOXONE HYDROCHLORIDE 0.4 MG/ML
0.4 INJECTION, SOLUTION INTRAMUSCULAR; INTRAVENOUS; SUBCUTANEOUS
Status: DISCONTINUED | OUTPATIENT
Start: 2017-09-27 | End: 2017-09-27 | Stop reason: HOSPADM

## 2017-09-27 RX ORDER — PROPOFOL 10 MG/ML
INJECTION, EMULSION INTRAVENOUS AS NEEDED
Status: DISCONTINUED | OUTPATIENT
Start: 2017-09-27 | End: 2017-09-27 | Stop reason: HOSPADM

## 2017-09-27 RX ORDER — ATROPINE SULFATE 0.1 MG/ML
0.5 INJECTION INTRAVENOUS
Status: DISCONTINUED | OUTPATIENT
Start: 2017-09-27 | End: 2017-09-27 | Stop reason: HOSPADM

## 2017-09-27 RX ORDER — SODIUM CHLORIDE 0.9 % (FLUSH) 0.9 %
5-10 SYRINGE (ML) INJECTION EVERY 8 HOURS
Status: DISCONTINUED | OUTPATIENT
Start: 2017-09-27 | End: 2017-09-27 | Stop reason: HOSPADM

## 2017-09-27 RX ADMIN — PROPOFOL 30 MG: 10 INJECTION, EMULSION INTRAVENOUS at 07:45

## 2017-09-27 RX ADMIN — PROPOFOL 50 MG: 10 INJECTION, EMULSION INTRAVENOUS at 07:41

## 2017-09-27 RX ADMIN — SODIUM CHLORIDE: 9 INJECTION, SOLUTION INTRAVENOUS at 07:15

## 2017-09-27 RX ADMIN — PROPOFOL 40 MG: 10 INJECTION, EMULSION INTRAVENOUS at 07:48

## 2017-09-27 RX ADMIN — PROPOFOL 120 MG: 10 INJECTION, EMULSION INTRAVENOUS at 07:34

## 2017-09-27 NOTE — DISCHARGE INSTRUCTIONS
Karen Chiu  [id number]  1940    COLON DISCHARGE INSTRUCTIONS  Discomfort:  Redness at IV site- apply warm compress to area; if redness or soreness persist- contact your physician  There may be a slight amount of blood passed from the rectum  Gaseous discomfort- walking, belching will help relieve any discomfort  You may not operate a vehicle for 12 hours  You may not engage in an occupation involving machinery or appliances for rest of today  You may not drink alcoholic beverages for at least 12 hours  Avoid making any critical decisions for at least 24 hour  DIET:   Regular diet. - however -  remember your colon is empty and a heavy meal will produce gas. Avoid these foods:  vegetables, fried / greasy foods, carbonated drinks for today. ACTIVITY:  You may resume your normal daily activities it is recommended that you spend the remainder of the day resting -  avoid any strenuous activity. CALL M.D. ANY SIGN OF:  Increasing pain, nausea, vomiting  Abdominal distension (swelling)  New increased bleeding (oral or rectal)  Fever (chills)  Pain in chest area  Bloody discharge from nose or mouth  Shortness of breath    You may not  take any Advil, Aspirin, Ibuprofen, Motrin, Aleve, Goodys, or any similar pain or arthritis products unless , ONLY  Tylenol as needed for pain. Follow-up Instructions:   Call Dr. Saucedo Govern  Results of procedure / biopsy in 10-14days   Office telephone for problems or questions [phone]      - Await pathology. - Repeat colonoscopy in 3 years.      - If < 10 years, reason: above average risk patient     - Start Prednisone 30 mg, discuss use of biologic or immunosuppressant, rule out c. diff

## 2017-09-27 NOTE — IP AVS SNAPSHOT
[address] [address] 
[phone] Patient: Gio Cristina MRN: [id number] RXW:[id number] You are allergic to the following Allergen Reactions Apriso (Mesalamine) Diarrhea Prednisone Other (comments)  
 constipation Sulfasalazine Hives Recent Documentation Height Weight BMI Smoking Status 1.854 m 102.5 kg 29.82 kg/m2 Former Smoker Emergency Contacts Name Discharge Info Relation Home Work Mobile James Hussein  Spouse [3] [phone] About your hospitalization You were admitted on:  September 27, 2017 You last received care in the:  [facility] ENDOSCOPY You were discharged on:  September 27, 2017 Unit phone number:  [phone] Why you were hospitalized Your primary diagnosis was:  Not on File Providers Seen During Your Hospitalizations Provider Role Specialty Primary office phone Tiffany Dixon MD Attending Provider Gastroenterology [phone] Your Primary Care Physician (PCP) Primary Care Physician Office Phone Office Fax Zac Read [phone] Follow-up Information None Current Discharge Medication List  
  
CONTINUE these medications which have NOT CHANGED Dose & Instructions Dispensing Information Comments Morning Noon Evening Bedtime  
 aspirin 81 mg tablet Your last dose was: Your next dose is:    
   
   
 Dose:  81 mg Take 81 mg by mouth nightly. Indications: myocardial infarction prevention Refills:  0  
     
   
   
   
  
 balsalazide 750 mg capsule Commonly known as:  Iman Hasten Your last dose was: Your next dose is:    
   
   
 Dose:  2250 mg Take 2,250 mg by mouth two (2) times a day. Refills:  0 Blood-Glucose Meter Misc Commonly known as:  TRUE METRIX GLUCOSE METER  
   
 Your last dose was: Your next dose is:    
   
   
 Use to test blood sugars daily. DX:  E11.49 Quantity:  1 Each Refills:  0  
     
   
   
   
  
 gabapentin 100 mg capsule Commonly known as:  NEURONTIN Your last dose was: Your next dose is:    
   
   
 Dose:  100 mg Take 100 mg by mouth three (3) times daily (with meals). Refills:  0  
     
   
   
   
  
 glucose blood VI test strips strip Commonly known as:  TRUE METRIX GLUCOSE TEST STRIP Your last dose was: Your next dose is:    
   
   
 Test blood sugars daily. DX: E11.49 Quantity:  100 Strip Refills:  3 Lancets & Blood Glucose Strips Cmpk Your last dose was: Your next dose is:    
   
   
 Element glucometer Quantity:  100 Each Refills:  11  
     
   
   
   
  
 lisinopril 10 mg tablet Commonly known as:  Rahat Fleming Your last dose was: Your next dose is:    
   
   
 Dose:  10 mg Take 10 mg by mouth nightly. Refills:  0  
     
   
   
   
  
 melatonin Tab tablet Your last dose was: Your next dose is:    
   
   
 Dose:  5 mg Take 5 mg by mouth nightly. Refills:  0  
     
   
   
   
  
 * metFORMIN 500 mg tablet Commonly known as:  GLUCOPHAGE Your last dose was: Your next dose is:    
   
   
 Dose:  500 mg Take 500 mg by mouth daily. Takes in am.  
 Refills:  0  
     
   
   
   
  
 * metFORMIN 500 mg tablet Commonly known as:  GLUCOPHAGE Your last dose was: Your next dose is:    
   
   
 Dose:  1000 mg Take 1,000 mg by mouth every evening. Refills:  0  
     
   
   
   
  
 metoprolol tartrate 25 mg tablet Commonly known as:  LOPRESSOR Your last dose was: Your next dose is: TAKE 1/2 TABLET BY MOUTH TWICE DAILY Quantity:  90 Tab Refills:  1 MULTIVITAMIN PO Your last dose was: Your next dose is:    
   
   
 Dose:  1 Tab Take 1 Tab by mouth daily. Refills:  0  
     
   
   
   
  
 sertraline 50 mg tablet Commonly known as:  ZOLOFT Your last dose was: Your next dose is:    
   
   
 Dose:  75 mg Take 75 mg by mouth daily. Refills:  0  
     
   
   
   
  
 simvastatin 40 mg tablet Commonly known as:  ZOCOR Your last dose was: Your next dose is: TAKE 1 TABLET BY MOUTH NIGHTLY Quantity:  90 Tab Refills:  1 THERALITH XR PO Your last dose was: Your next dose is:    
   
   
 Dose:  2 Tab Take 2 tablets by mouth two (2) times a day. Refills:  0  
     
   
   
   
  
 VITAMIN D3 2,000 unit Tab Generic drug:  cholecalciferol (vitamin D3) Your last dose was: Your next dose is:    
   
   
 Dose:  2000 Units Take 2,000 Units by mouth daily. Refills:  0  
     
   
   
   
  
 * Notice: This list has 2 medication(s) that are the same as other medications prescribed for you. Read the directions carefully, and ask your doctor or other care provider to review them with you. Discharge Instructions Glory Pacheco [phone] 
1940 COLON DISCHARGE INSTRUCTIONS Discomfort: 
Redness at IV site- apply warm compress to area; if redness or soreness persist- contact your physician There may be a slight amount of blood passed from the rectum Gaseous discomfort- walking, belching will help relieve any discomfort You may not operate a vehicle for 12 hours You may not engage in an occupation involving machinery or appliances for rest of today You may not drink alcoholic beverages for at least 12 hours Avoid making any critical decisions for at least 24 hour DIET: 
 Regular diet.  however -  remember your colon is empty and a heavy meal will produce gas. Avoid these foods:  vegetables, fried / greasy foods, carbonated drinks for today. ACTIVITY: 
You may resume your normal daily activities it is recommended that you spend the remainder of the day resting -  avoid any strenuous activity. CALL M.D. ANY SIGN OF: Increasing pain, nausea, vomiting Abdominal distension (swelling) New increased bleeding (oral or rectal) Fever (chills) Pain in chest area Bloody discharge from nose or mouth Shortness of breath You may not  take any Advil, Aspirin, Ibuprofen, Motrin, Aleve, Goodys, or any similar pain or arthritis products unless , ONLY  Tylenol as needed for pain. Follow-up Instructions: 
 Call Dr. Lewis Jones Results of procedure / biopsy in 10-14days Office telephone for problems or questions [phone] - Await pathology. - Repeat colonoscopy in 3 years. - If < 10 years, reason: above average risk patient 
   - Start Prednisone 30 mg, discuss use of biologic or immunosuppressant, rule out c. diff Discharge Orders None ACO Transitions of Care Introducing [employer] [address] offers a voluntary care coordination program to provide high quality service and care to [facility] fee-for-service beneficiaries. Wes Madrigal was designed to help you enhance your health and well-being through the following services: ? Transitions of Care  support for individuals who are transitioning from one care setting to another (example: Hospital to home). ? Chronic and Complex Care Coordination  support for individuals and caregivers of those with serious or chronic illnesses or with more than one chronic (ongoing) condition and those who take a number of different medications. If you meet specific medical criteria, a [facility] [address] may call you directly to coordinate your care with your primary care physician and your other care providers. For questions about the [facility] programs, please, contact your physicians office. For general questions or additional information about Accountable Care Organizations: 
Please visit www.medicare.gov/acos. html or call 1-800-MEDICARE (0-256.166.4011) TTY users should call 6-769.528.1939. Introducing [facility] & HEALTH SERVICES! [facility] introduces [employer] patient portal. Now you can access parts of your medical record, email your doctor's office, and request medication refills online. 1. In your internet browser, go to [email or web address] ([email or web address]). [email or web address] ([email or web address]) 2. Click on the First Time User? Click Here link in the Sign In box. You will see the New Member Sign Up page. 3. Enter your [employer] Access Code exactly as it appears below. You will not need to use this code after youve completed the sign-up process. If you do not sign up before the expiration date, you must request a new code. · [employer] Access Code: [email or web address] Expires: 10/4/2017  8:15 AM 
 
4. Enter the last four digits of your Social Security Number (xxxx) and Date of Birth (mm/dd/yyyy) as indicated and click Submit. You will be taken to the next sign-up page. 5. Create a [employer] ID. This will be your [employer] login ID and cannot be changed, so think of one that is secure and easy to remember. 6. Create a [employer] password. You can change your password at any time. 7. Enter your Password Reset Question and Answer. This can be used at a later time if you forget your password. 8. Enter your e-mail address. You will receive e-mail notification when new information is available in [address] 9. Click Sign Up. You can now view and download portions of your medical record. 10. Click the Download Summary menu link to download a portable copy of your medical information. If you have questions, please visit the Frequently Asked Questions section of the [employer] website. Remember, [employer] is NOT to be used for urgent needs. For medical emergencies, dial 911. Now available from your iPhone and Android! General Information Please provide this summary of care documentation to your next provider. Patient Signature:  ____________________________________________________________ Date:  ____________________________________________________________  
  
Barbie Artist Provider Signature:  ____________________________________________________________ Date:  ____________________________________________________________

## 2017-09-27 NOTE — ANESTHESIA POSTPROCEDURE EVALUATION
Post-Anesthesia Evaluation and Assessment    Patient: Becki Mandujano MRN: [id number]  SSN: [id number]    YOB: 1940  Age: 77 y.o. Sex: male       Cardiovascular Function/Vital Signs  Visit Vitals    /75    Pulse (!) 59    Temp 37.1 °C (98.8 °F)    Resp 15    Ht 6' 1\" (1.854 m)    Wt 102.5 kg (226 lb)    SpO2 98%    BMI 29.82 kg/m2       Patient is status post MAC anesthesia for Procedure(s):  COLONOSCOPY  COLON BIOPSY. Nausea/Vomiting: None    Postoperative hydration reviewed and adequate. Pain:  Pain Scale 1: Numeric (0 - 10) (09/27/17 0850)  Pain Intensity 1: 0 (09/27/17 0850)   Managed    Neurological Status: At baseline    Mental Status and Level of Consciousness: Arousable    Pulmonary Status:   O2 Device: Room air (09/27/17 0825)   Adequate oxygenation and airway patent    Complications related to anesthesia: None    Post-anesthesia assessment completed.  No concerns    Signed By: Tenzin Boudreaux MD     September 27, 2017

## 2017-09-27 NOTE — H&P
Pre-endoscopy H and P for Colonoscopy    The patient was seen and examined. Date of last colonoscopy: 2016, Polyps  Yes      The airway was assessed and documented. The problem list, past medical history, and medications were reviewed. Patient Active Problem List   Diagnosis Code    Essential hypertension, benign I10    CAD (coronary artery disease) I25.10    DM (diabetes mellitus) type II controlled, neurological manifestation ([facility] E11.49    Pure hypercholesterolemia E78.00    Depression F32.9    Chronic back pain M54.9, G89.29    UC (ulcerative colitis) ([facility] K51.90    Pelvic kidney Q63.2    Scoliosis M41.9    Kidney stone N20.0    CARROL (obstructive sleep apnea) G47.33     Social History     Social History    Marital status:      Spouse name: N/A    Number of children: N/A    Years of education: N/A     Occupational History    Not on file. Social History Main Topics    Smoking status: Former Smoker     Quit date: 1/1/1990    Smokeless tobacco: Never Used    Alcohol use No      Comment: none    Drug use: No    Sexual activity: Not Currently     Other Topics Concern    Not on file     Social History Narrative     Past Medical History:   Diagnosis Date    BPH (benign prostatic hyperplasia)     improved after TURP    CAD (coronary artery disease) 2005    MI, PCI/stent to mid CX 6/1/02, CABG x2 on 9/6/02 (LIMA to LAD & SVG to CX)    Calculus of kidney     due to pelvic kidney    Chronic back pain     scoliosis & OA    Depression     Diabetes ([facility]     Hypertension     CARROL (obstructive sleep apnea) 6/10/2016    uses CPAP    Other and unspecified hyperlipidemia     UC (ulcerative colitis) ([facility]      The patient has a family history of na    Prior to Admission Medications   Prescriptions Last Dose Informant Patient Reported? Taking? Blood-Glucose Meter (TRUE METRIX GLUCOSE METER) [facility] 9/26/2017 at Unknown time  No Yes   Sig: Use to test blood sugars daily.   DX:  E11.49 Lancets & Blood Glucose Strips Cmpk   No No   Sig: Element glucometer   MULTIVITAMIN PO 9/26/2017 at Unknown time  Yes Yes   Sig: Take 1 Tab by mouth daily. VIT B6/MAG CIT & OX/POTASS CIT (THERALITH XR PO) 9/26/2017 at Unknown time  Yes Yes   Sig: Take 2 tablets by mouth two (2) times a day. aspirin 81 mg tablet 9/23/2017  Yes Yes   Sig: Take 81 mg by mouth nightly. Indications: myocardial infarction prevention   balsalazide (COLAZAL) 750 mg capsule 9/26/2017 at Unknown time  Yes Yes   Sig: Take 2,250 mg by mouth two (2) times a day. cholecalciferol, vitamin D3, (VITAMIN D3) 2,000 unit tab 9/26/2017 at Unknown time  Yes Yes   Sig: Take 2,000 Units by mouth daily. gabapentin (NEURONTIN) 100 mg capsule 9/26/2017 at Unknown time  Yes Yes   Sig: Take 100 mg by mouth three (3) times daily (with meals). glucose blood VI test strips (TRUE METRIX GLUCOSE TEST STRIP) strip   No No   Sig: Test blood sugars daily. DX: E11.49   lisinopril (PRINIVIL, ZESTRIL) 10 mg tablet 9/26/2017 at Unknown time  Yes Yes   Sig: Take 10 mg by mouth nightly. melatonin tab tablet 9/26/2017 at Unknown time  Yes Yes   Sig: Take 5 mg by mouth nightly. metFORMIN (GLUCOPHAGE) 500 mg tablet 9/26/2017 at Unknown time  Yes Yes   Sig: Take 500 mg by mouth daily. Takes in am.   metFORMIN (GLUCOPHAGE) 500 mg tablet 9/26/2017 at Unknown time  Yes Yes   Sig: Take 1,000 mg by mouth every evening. metoprolol tartrate (LOPRESSOR) 25 mg tablet 9/27/2017 at Unknown time  No Yes   Sig: TAKE 1/2 TABLET BY MOUTH TWICE DAILY   sertraline (ZOLOFT) 50 mg tablet 9/26/2017 at Unknown time  Yes Yes   Sig: Take 75 mg by mouth daily.    simvastatin (ZOCOR) 40 mg tablet 9/26/2017 at Unknown time  No Yes   Sig: TAKE 1 TABLET BY MOUTH NIGHTLY      Facility-Administered Medications: None         The review of systems is:  negative for shortness of breath or chest pain      The heart, lungs and mental status were satisfactory for the administration of MAC sedation and for the procedure. Mallampati score: See Anesthesia. I discussed with the patient the objectives, risks, consequences and alternatives to the procedure. Plan: Endoscopic procedure with MAC sedation.     Celina Chen MD  9/27/2017  7:29 AM

## 2017-09-27 NOTE — ANESTHESIA PREPROCEDURE EVALUATION
Anesthetic History   No history of anesthetic complications            Review of Systems / Medical History  Patient summary reviewed, nursing notes reviewed and pertinent labs reviewed    Pulmonary        Sleep apnea: CPAP           Neuro/Psych         Psychiatric history     Cardiovascular    Hypertension          Past MI, CAD, cardiac stents and CABG         GI/Hepatic/Renal  Within defined limits              Endo/Other    Diabetes         Other Findings            Physical Exam    Airway  Mallampati: II  TM Distance: > 6 cm  Neck ROM: normal range of motion   Mouth opening: Normal     Cardiovascular  Regular rate and rhythm,  S1 and S2 normal,  no murmur, click, rub, or gallop             Dental  No notable dental hx       Pulmonary  Breath sounds clear to auscultation               Abdominal  GI exam deferred       Other Findings            Anesthetic Plan    ASA: 3  Anesthesia type: MAC            Anesthetic plan and risks discussed with: Patient

## 2017-09-27 NOTE — PROCEDURES
Colonoscopy Procedure Note    Indications:   Diarrhea, Ulcerative colitis, rectal bleeding, Diabetes  Referring Physician: Thomas Leos MD   Anesthesia/Sedation:MAC  Endoscopist:  Dr. Ab Casey  Assistant:  Endoscopy Technician-1: Franny Cabrera IV  Endoscopy RN-1: Ryder Saleem RN    Preoperative diagnosis: ULCERATIVE COLITIS    Postoperative diagnosis: 1. Universal Mild Ulcerative Colitis with distal rectal sparing      Procedure in Detail:  Informed consent was obtained for the procedure, including sedation. Risks of perforation, hemorrhage, adverse drug reaction, and aspiration were discussed. The patient was placed in the left lateral decubitus position. Based on the pre-procedure assessment, including review of the patient's medical history, medications, allergies, and review of systems, he had been deemed to be an appropriate candidate for moderate sedation; he was therefore sedated with the medications listed above. The patient was monitored continuously with ECG tracing, pulse oximetry, blood pressure monitoring, and direct observations. A rectal examination was performed. The [id number] was inserted into the rectum and advanced under direct vision to the cecum, which was identified by the ileocecal valve and appendiceal orifice. The quality of the colonic preparation was excellent. A careful inspection was made as the colonoscope was withdrawn, including a retroflexed view of the rectum; findings and interventions are described below. Findings: aside from distal rectum universal mild colitis manifested by partial loss of vasculature, not much edema punctate exudate, segmental Bx's obtained see path for location r/o c. diff  Rectum: distal rectal sparing - Bx from distal and proximal rectum  Terminal Ileum: not intubated    Specimens:     see above    EBL: None    Complications: None; patient tolerated the procedure well.       Recommendations:     - Await pathology. - Repeat colonoscopy in 3 years.      - If < 10 years, reason: above average risk patient     - Start Prednisone 30 mg, discuss use of biologic or immunosuppressant, rule out c. diff    Signed By: Aleta Phillips MD                        September 27, 2017

## 2017-10-01 DIAGNOSIS — F33.1 MODERATE EPISODE OF RECURRENT MAJOR DEPRESSIVE DISORDER (HCC): ICD-10-CM

## 2017-10-01 RX ORDER — SERTRALINE HYDROCHLORIDE 50 MG/1
TABLET, FILM COATED ORAL
Qty: 135 TAB | Refills: 1 | Status: SHIPPED | OUTPATIENT
Start: 2017-10-01 | End: 2018-04-03 | Stop reason: SDUPTHER

## 2017-10-28 DIAGNOSIS — E78.5 HYPERLIPEMIA: ICD-10-CM

## 2017-10-28 DIAGNOSIS — E11.42 DIABETIC PERIPHERAL NEUROPATHY (HCC): ICD-10-CM

## 2017-10-29 RX ORDER — SIMVASTATIN 40 MG/1
TABLET, FILM COATED ORAL
Qty: 90 TAB | Refills: 1 | Status: SHIPPED | OUTPATIENT
Start: 2017-10-29 | End: 2018-04-04 | Stop reason: SDUPTHER

## 2017-11-29 RX ORDER — CALCIUM CITRATE/VITAMIN D3 200MG-6.25
TABLET ORAL
Qty: 100 STRIP | Refills: 1 | Status: SHIPPED | OUTPATIENT
Start: 2017-11-29 | End: 2019-03-13 | Stop reason: SDUPTHER

## 2017-12-04 DIAGNOSIS — I25.10 CORONARY ARTERY DISEASE INVOLVING NATIVE CORONARY ARTERY WITHOUT ANGINA PECTORIS, UNSPECIFIED WHETHER NATIVE OR TRANSPLANTED HEART: ICD-10-CM

## 2017-12-04 DIAGNOSIS — E11.49 DM (DIABETES MELLITUS) TYPE II CONTROLLED, NEUROLOGICAL MANIFESTATION (HCC): ICD-10-CM

## 2017-12-04 RX ORDER — METOPROLOL TARTRATE 25 MG/1
TABLET, FILM COATED ORAL
Qty: 90 TAB | Refills: 1 | Status: SHIPPED | OUTPATIENT
Start: 2017-12-04 | End: 2018-05-14 | Stop reason: SDUPTHER

## 2017-12-04 RX ORDER — GABAPENTIN 100 MG/1
CAPSULE ORAL
Qty: 270 CAP | Refills: 1 | Status: SHIPPED | OUTPATIENT
Start: 2017-12-04 | End: 2018-04-03 | Stop reason: SDUPTHER

## 2017-12-07 ENCOUNTER — TELEPHONE (OUTPATIENT)
Dept: INTERNAL MEDICINE CLINIC | Age: 77
End: 2017-12-07

## 2017-12-26 ENCOUNTER — TELEPHONE (OUTPATIENT)
Dept: INTERNAL MEDICINE CLINIC | Age: 77
End: 2017-12-26

## 2018-01-04 RX ORDER — METFORMIN HYDROCHLORIDE 500 MG/1
1000 TABLET ORAL EVERY EVENING
Qty: 180 TAB | Refills: 0 | Status: SHIPPED | OUTPATIENT
Start: 2018-01-04 | End: 2018-05-14 | Stop reason: SDUPTHER

## 2018-01-04 RX ORDER — LISINOPRIL 10 MG/1
10 TABLET ORAL
Qty: 90 TAB | Refills: 0 | Status: SHIPPED | OUTPATIENT
Start: 2018-01-04 | End: 2018-05-14 | Stop reason: SDUPTHER

## 2018-01-04 RX ORDER — METFORMIN HYDROCHLORIDE 500 MG/1
500 TABLET ORAL DAILY
Qty: 90 TAB | Refills: 0 | Status: SHIPPED | OUTPATIENT
Start: 2018-01-04 | End: 2018-05-14 | Stop reason: SDUPTHER

## 2018-02-13 ENCOUNTER — TELEPHONE (OUTPATIENT)
Dept: INTERNAL MEDICINE CLINIC | Age: 78
End: 2018-02-13

## 2018-02-13 DIAGNOSIS — E11.49 CONTROLLED TYPE 2 DIABETES MELLITUS WITH OTHER NEUROLOGIC COMPLICATION, WITHOUT LONG-TERM CURRENT USE OF INSULIN (HCC): Primary | ICD-10-CM

## 2018-02-16 ENCOUNTER — HOSPITAL ENCOUNTER (OUTPATIENT)
Dept: LAB | Age: 78
Discharge: HOME | End: 2018-02-16
Payer: MEDICARE

## 2018-02-16 PROCEDURE — 85027 COMPLETE CBC AUTOMATED: CPT

## 2018-02-16 PROCEDURE — 83036 HEMOGLOBIN GLYCOSYLATED A1C: CPT

## 2018-02-16 PROCEDURE — 80053 COMPREHEN METABOLIC PANEL: CPT

## 2018-02-16 PROCEDURE — 82043 UR ALBUMIN QUANTITATIVE: CPT

## 2018-02-16 PROCEDURE — 80061 LIPID PANEL: CPT

## 2018-02-17 LAB
ALBUMIN SERPL-MCNC: 4.4 G/DL (ref 3.5–4.8)
ALBUMIN/CREAT UR: 8.3 MG/G CREAT (ref 0–30)
ALBUMIN/GLOB SERPL: 2.2 {RATIO} (ref 1.2–2.2)
ALP SERPL-CCNC: 63 IU/L (ref 39–117)
ALT SERPL-CCNC: 14 IU/L (ref 0–44)
AST SERPL-CCNC: 21 IU/L (ref 0–40)
BILIRUB SERPL-MCNC: 0.5 MG/DL (ref 0–1.2)
BUN SERPL-MCNC: 8 MG/DL (ref 8–27)
BUN/CREAT SERPL: 9 (ref 10–24)
CALCIUM SERPL-MCNC: 9.6 MG/DL (ref 8.6–10.2)
CHLORIDE SERPL-SCNC: 98 MMOL/L (ref 96–106)
CHOLEST SERPL-MCNC: 125 MG/DL (ref 100–199)
CO2 SERPL-SCNC: 23 MMOL/L (ref 18–29)
CREAT SERPL-MCNC: 0.87 MG/DL (ref 0.76–1.27)
CREAT UR-MCNC: 55.1 MG/DL
ERYTHROCYTE [DISTWIDTH] IN BLOOD BY AUTOMATED COUNT: 13.9 % (ref 12.3–15.4)
EST. AVERAGE GLUCOSE BLD GHB EST-MCNC: 140 MG/DL
GFR SERPLBLD CREATININE-BSD FMLA CKD-EPI: 84 ML/MIN/1.73
GFR SERPLBLD CREATININE-BSD FMLA CKD-EPI: 97 ML/MIN/1.73
GLOBULIN SER CALC-MCNC: 2 G/DL (ref 1.5–4.5)
GLUCOSE SERPL-MCNC: 115 MG/DL (ref 65–99)
HBA1C MFR BLD: 6.5 % (ref 4.8–5.6)
HCT VFR BLD AUTO: 39.5 % (ref 37.5–51)
HDLC SERPL-MCNC: 46 MG/DL
HGB BLD-MCNC: 13.1 G/DL (ref 13–17.7)
LDLC SERPL CALC-MCNC: 48 MG/DL (ref 0–99)
MCH RBC QN AUTO: 30.9 PG (ref 26.6–33)
MCHC RBC AUTO-ENTMCNC: 33.2 G/DL (ref 31.5–35.7)
MCV RBC AUTO: 93 FL (ref 79–97)
MICROALBUMIN UR-MCNC: 4.6 UG/ML
PLATELET # BLD AUTO: 211 X10E3/UL (ref 150–379)
POTASSIUM SERPL-SCNC: 4.5 MMOL/L (ref 3.5–5.2)
PROT SERPL-MCNC: 6.4 G/DL (ref 6–8.5)
RBC # BLD AUTO: 4.24 X10E6/UL (ref 4.14–5.8)
SODIUM SERPL-SCNC: 140 MMOL/L (ref 134–144)
TRIGL SERPL-MCNC: 153 MG/DL (ref 0–149)
VLDLC SERPL CALC-MCNC: 31 MG/DL (ref 5–40)
WBC # BLD AUTO: 6.6 X10E3/UL (ref 3.4–10.8)

## 2018-02-19 NOTE — TELEPHONE ENCOUNTER
Letter sent to patient. All labs are stable or at goal except for slight drop in HGB. He has appt on 2/20 to review.

## 2018-02-20 ENCOUNTER — OFFICE VISIT (OUTPATIENT)
Dept: INTERNAL MEDICINE CLINIC | Age: 78
End: 2018-02-20

## 2018-02-20 VITALS
OXYGEN SATURATION: 96 % | HEIGHT: 72 IN | DIASTOLIC BLOOD PRESSURE: 70 MMHG | SYSTOLIC BLOOD PRESSURE: 104 MMHG | TEMPERATURE: 97.9 F | RESPIRATION RATE: 16 BRPM | HEART RATE: 80 BPM | BODY MASS INDEX: 31.13 KG/M2 | WEIGHT: 229.8 LBS

## 2018-02-20 DIAGNOSIS — E11.49 CONTROLLED TYPE 2 DIABETES MELLITUS WITH OTHER NEUROLOGIC COMPLICATION, WITHOUT LONG-TERM CURRENT USE OF INSULIN (HCC): ICD-10-CM

## 2018-02-20 DIAGNOSIS — I10 ESSENTIAL HYPERTENSION, BENIGN: ICD-10-CM

## 2018-02-20 DIAGNOSIS — F33.41 RECURRENT MAJOR DEPRESSIVE DISORDER, IN PARTIAL REMISSION (HCC): ICD-10-CM

## 2018-02-20 DIAGNOSIS — Z00.00 MEDICARE ANNUAL WELLNESS VISIT, SUBSEQUENT: Primary | ICD-10-CM

## 2018-02-20 DIAGNOSIS — Z13.39 SCREENING FOR ALCOHOLISM: ICD-10-CM

## 2018-02-20 DIAGNOSIS — E78.00 PURE HYPERCHOLESTEROLEMIA: ICD-10-CM

## 2018-02-20 DIAGNOSIS — G47.33 OSA (OBSTRUCTIVE SLEEP APNEA): ICD-10-CM

## 2018-02-20 DIAGNOSIS — Z71.89 ACP (ADVANCE CARE PLANNING): ICD-10-CM

## 2018-02-20 DIAGNOSIS — I25.10 CORONARY ARTERY DISEASE INVOLVING NATIVE CORONARY ARTERY OF NATIVE HEART WITHOUT ANGINA PECTORIS: ICD-10-CM

## 2018-02-20 DIAGNOSIS — K51.911 ULCERATIVE COLITIS WITH RECTAL BLEEDING, UNSPECIFIED LOCATION (HCC): ICD-10-CM

## 2018-02-20 DIAGNOSIS — Z23 ENCOUNTER FOR IMMUNIZATION: ICD-10-CM

## 2018-02-20 RX ORDER — BALSALAZIDE DISODIUM 750 MG/1
2250 CAPSULE ORAL 3 TIMES DAILY
COMMUNITY
End: 2018-08-20

## 2018-02-20 RX ORDER — DICYCLOMINE HYDROCHLORIDE 10 MG/1
10 CAPSULE ORAL
COMMUNITY
End: 2019-09-17 | Stop reason: SDUPTHER

## 2018-02-20 NOTE — ACP (ADVANCE CARE PLANNING)
Advance Care Planning (ACP) Provider Note - Comprehensive     Date of ACP Conversation: 02/20/18  Persons included in Conversation:  patient  Length of ACP Conversation in minutes: <16 minutes (Non-Billable)    Authorized Decision Maker (if patient is incapable of making informed decisions): This person is: Healthcare Agent/Medical Power of  under Advance Directive          General ACP for ALL Patients with Decision Making Capacity:  Importance of advance care planning, including choosing a healthcare agent to communicate patient's healthcare decisions if patient lost the ability to make decisions, such as after a sudden illness or accident  Understanding of the healthcare agent role was assessed and information provided  Opportunity offered to explore how cultural, Holiness, spiritual, or personal beliefs would affect decisions for future care  Exploration of values, goals, and preferences if recovery is not expected, even with continued medical treatment in the event of: Imminent death or severe, permanent brain injury    For Serious or Chronic Illness:  Understanding of CPR, goals and expected outcomes, benefits and burdens discussed. Understanding of medical condition  Information on CPR success rates provided (e.g. for CPR in hospital, survival to d/c at two weeks is 22%, for chronically ill or elderly/frail survival is less than 3%)    Interventions Provided:  Reviewed existing Advance Directive   Recommended communicating the plan and making copies for the healthcare agent, personal physician, and others as appropriate (e.g., health system)  Recommended review of completed ACP document annually or upon change in health status       He has an advanced directive - a copy has been provided & still reflects him wishes. Reviewed DNR/DNI and patient is not interested.

## 2018-02-20 NOTE — PROGRESS NOTES
Areli Luna is a 69 y.o. male who was seen in clinic today (2/20/2018) for a full physical.      Assessment & Plan:   Diagnoses and all orders for this visit:    1. Medicare annual wellness visit, subsequent    2. ACP (advance care planning)    3. Encounter for immunization  -     varicella-zoster recombinant, PF, (SHINGRIX) 50 mcg/0.5 mL susr injection; 0.5 mL IM once and then repeat in 2-6 months    4. Screening for alcoholism  -     Annual  Alcohol Screen 15 min ()    5. Body mass index 31.0-31.9, adult-stable, needs improvement, I have reviewed/discussed the above normal BMI with the patient. I have recommended the following interventions: encourage exercise and lifestyle education regarding diet. 6. 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 -      DIABETES FOOT EXAM    7. Ulcerative colitis with rectal bleeding, unspecified location [facility])- not ideally controlled, defer to specialist     8. Recurrent major depressive disorder, in partial remission ([facility]- improved, reviewed treatment options with him, reviewed life style changes to help improve mood, continue current treatment. Not sure hives was due to higher dose of zoloft but doing well so will continue on 75mg dose     9. Coronary artery disease involving native coronary artery of native heart without angina pectoris- asymptomatic. BP is well controlled, lipids are well controlled. Continue: current plan. 10. Essential hypertension, benign- at goal, continue current treatment     11. Pure hypercholesterolemia- well controlled, continue current treatment     12.  CARROL (obstructive sleep apnea)- stable, defer to specialist          Follow-up Disposition:  Return in about 6 months (around 8/20/2018), or if symptoms worsen or fail to improve.        ------------------------------------------------------------------------------------------    Subjective: Annual Wellness Visit- Subsequent Visit    End of Life Planning: This was discussed with him today and he has an advanced directive - a copy has been provided. Reviewed DNR/DNI and patient is not interested. Depression Screen:   PHQ over the last two weeks 2/20/2018   PHQ Not Done -   Little interest or pleasure in doing things Not at all   Feeling down, depressed or hopeless Not at all   Total Score PHQ 2 0   Trouble falling or staying asleep, or sleeping too much -   Feeling tired or having little energy -   Poor appetite or overeating -   Feeling bad about yourself - or that you are a failure or have let yourself or your family down -   Trouble concentrating on things such as school, work, reading or watching TV -   Moving or speaking so slowly that other people could have noticed; or the opposite being so fidgety that others notice -   Thoughts of being better off dead, or hurting yourself in some way -   PHQ 9 Score -   How difficult have these problems made it for you to do your work, take care of your home and get along with others -       Fall Risk:   Fall Risk Assessment, last 12 mths 2/20/2018   Able to walk? Yes   Fall in past 12 months? No       Abuse Screen:  Abuse Screening Questionnaire 2/20/2018   Do you ever feel afraid of your partner? N   Are you in a relationship with someone who physically or mentally threatens you? N   Is it safe for you to go home? Y         Alcohol Risk Factor Screening: On any occasion during the past 3 months, have you had more than 4 drinks containing alcohol? No  Do you average more than 14 drinks per week? No    Hearing Loss: The patient wears hearing aids.     Cognition Screen:  Has your family/caregiver stated any concerns about your memory: no  appropriate for age attention/concentration and appropriate safety awareness    Activities of Daily Living:    Requires assistance with: no ADLs  Home contains: handrails and grab bars  Exercise: not active    Adult Nutrition Screen:  No risk factors noted. Health Maintenance  Daily Aspirin: yes  AAA Screening: n/a (IPPE only)  Glaucoma Screening: UTD      Immunizations:     Influenza: up to date. Tetanus: up to date. Shingles: up to date, Shingrix prescription provided. Pneumonia: up to date. Cancer screening:    Prostate: reviewed guidelines, UTD- monitored by urologist.  Colon: guidelines reviewed, UTD. Patient Care Team:  Anne Ba MD as PCP - General (Internal Medicine)  Eunice Gamboa MD (Ophthalmology)  Artie Mathew MD as Physician (Urology)  Manoj Naranjo RN as Nurse Navigator  Chantel Cobian [place] (Podiatry)  Perlita Dunn MD (Pulmonary Disease)  Eboni Aguilar MD (Gastroenterology)  Evgeny Moore MD (Gastroenterology)         In addition to the above issues we also reviewed the following acute and/or chronic problems:    Mental Health Review  Patient is seen for depression. Since last visit: he had some issues w/ hives, not sure if related to the increase from 75mg to 100mg, so dose lowered. He reports mood improved since grandson got a cat. Ongoing depression symptoms include: anhedonia, and \"short fuse\" but improved. He denies any: depressed mood, hopelessness. Reports experiences the following side effects from the treatment: none.         Endocrine Review  He is seen for diabetes & neuropathy. Since last visit he reports: no significant changes. Neuropathy is well controlled, but starts to wear off in the evening. Home glucose monitoring: is performed sporadically, fasting values range 125-130. He is checking his sugars twice per week. BP diary was no reviewed. He reports medication compliance: compliant all of the time. Medication side effects: none. Diabetic diet compliance: compliant all of the time.   Lab review: labs reviewed and discussed with patient. Eye exam: UTD.     Cardiovascular Review  The patient has CAD, hypertension, and hyperlipidemia. Since last visit: no changes. He reports taking medications as instructed, no medication side effects noted, patient does not perform home BP monitoring. Diet and Lifestyle: generally follows a low fat low cholesterol diet, generally follows a low sodium diet, sedentary. Labs: reviewed and discussed with patient. Brief Labs:  Lab Results   Component Value Date/Time    Sodium 140 02/16/2018 10:12 AM    Potassium 4.5 02/16/2018 10:12 AM    Creatinine 0.87 02/16/2018 10:12 AM    Cholesterol, total 125 02/16/2018 10:12 AM    HDL Cholesterol 46 02/16/2018 10:12 AM    LDL, calculated 48 02/16/2018 10:12 AM    Triglyceride 153 02/16/2018 10:12 AM    Hemoglobin A1c 6.5 02/16/2018 10:12 AM                The following sections were reviewed & updated as appropriate: PMH, PSH, FH, and SH. Patient Active Problem List   Diagnosis Code    Essential hypertension, benign I10    CAD (coronary artery disease) I25.10    DM (diabetes mellitus) type II controlled, [facility] manifestation [facility] E11.49    Pure hypercholesterolemia E78.00    Recurrent major depressive disorder ([facility] F33.9    Chronic back pain M54.9, G89.29    UC (ulcerative colitis) ([facility] K51.90    Pelvic kidney Q63.2    Scoliosis M41.9    Kidney stone N20.0    CARROL (obstructive sleep apnea) G47.33          Prior to Admission medications    Medication Sig Start Date End Date Taking? Authorizing Provider   balsalazide (COLAZAL) 750 mg capsule Take 2,250 mg by mouth three (3) times daily. Yes Historical Provider   dicyclomine (BENTYL) 10 mg capsule Take 10 mg by mouth three (3) times daily as needed. Yes Historical Provider   lisinopril (PRINIVIL, ZESTRIL) 10 mg tablet Take 1 Tab by mouth nightly. 1/4/18  Yes Sophie Richardson MD   metFORMIN (GLUCOPHAGE) 500 mg tablet Take 1 Tab by mouth daily. Takes in am. 1/4/18  Yes Cristiano Carreon MD   metFORMIN (GLUCOPHAGE) 500 mg tablet Take 2 Tabs by mouth every evening. 1/4/18  Yes Cristiano Carreon MD   gabapentin (NEURONTIN) 100 mg capsule TAKE 1 CAPSULE BY MOUTH THREE TIMES DAILY 12/4/17  Yes Cristiano Carreon MD   metoprolol tartrate (LOPRESSOR) 25 mg tablet TAKE 1/2 TABLET BY MOUTH TWICE DAILY 12/4/17  Yes Cristiano Carreon MD   TRUE METRIX GLUCOSE TEST STRIP strip TEST ONCE DAILY AS DIRECTED 11/29/17  Yes Cristiano Carreon MD   simvastatin (ZOCOR) 40 mg tablet TAKE 1 TABLET BY MOUTH NIGHTLY 10/29/17  Yes Cristiano Carreon MD   sertraline (ZOLOFT) 50 mg tablet TAKE 1 AND 1/2 TABLETS BY MOUTH DAILY 10/1/17  Yes Cristiano Carreon MD   MULTIVITAMIN PO Take 1 Tab by mouth daily. Yes Historical Provider   cholecalciferol, vitamin D3, (VITAMIN D3) 2,000 unit tab Take 2,000 Units by mouth daily. Yes Historical Provider   melatonin tab tablet Take 5 mg by mouth nightly. Yes Historical Provider   Blood-Glucose Meter (TRUE METRIX GLUCOSE METER) misc Use to test blood sugars daily. DX:  E11.49 11/10/16  Yes Cristiano Carreon MD   glucose blood VI test strips (TRUE METRIX GLUCOSE TEST STRIP) strip Test blood sugars daily. DX: E11.49 11/10/16  Yes Cristiano Carreon MD   VIT B6/MAG CIT & OX/POTASS CIT (THERALITH XR PO) Take 2 tablets by mouth two (2) times a day. Yes Historical Provider   Lancets & Blood Glucose Strips Cmpk Element glucometer 2/26/13  Yes Nico Kelly MD   aspirin 81 mg tablet Take 81 mg by mouth nightly. Indications: myocardial infarction prevention   Yes Historical Provider          Allergies   Allergen Reactions    Apriso [Mesalamine] Diarrhea    Prednisone Other (comments)     constipation    Sulfasalazine Hives              Review of Systems   Constitutional: Negative for chills, fever and malaise/fatigue. Respiratory: Negative for cough and shortness of breath.     Cardiovascular: Negative for chest pain and palpitations. Gastrointestinal: Positive for blood in stool and diarrhea. Negative for abdominal pain, constipation, heartburn, melena, nausea and vomiting. Genitourinary:        He denies: nocturia, urinary hesitancy, urinary frequency, weak stream.   Musculoskeletal: Positive for joint pain (both shoulders & hands, mostly at night). Negative for myalgias. Neurological: Negative for tingling, focal weakness and headaches. Endo/Heme/Allergies: Does not bruise/bleed easily. Psychiatric/Behavioral: Negative for depression. The patient is not nervous/anxious and does not have insomnia. Objective:   Physical Exam   Constitutional: No distress. HENT:   Mouth/Throat: Mucous membranes are normal.   Hearing aides bilaterally   Eyes: Conjunctivae are normal. No scleral icterus. Neck: Neck supple. Cardiovascular: Regular rhythm and normal heart sounds. No murmur heard. Pulmonary/Chest: Effort normal and breath sounds normal. He has no wheezes. He has no rales. Abdominal: Soft. Bowel sounds are normal. He exhibits no mass. There is no hepatosplenomegaly. There is no tenderness. Musculoskeletal: He exhibits no edema. Lymphadenopathy:     He has no cervical adenopathy. Neurological:        Left Foot: Inspection: normal.  Pulse DP: 2+ (normal). Filament test: normal sensation with micro filament. Right Foot: Inspection: normal.  Pulse DP: 2+ (normal). Filament test: normal sensation with micro filament. Skin: No rash noted. Psychiatric: He has a normal mood and affect. His behavior is normal.          Visit Vitals    /70    Pulse 80    Temp 97.9 °F (36.6 °C) (Oral)    Resp 16    Ht 6' 0.1\" (1.831 m)    Wt 229 lb 12.8 oz (104.2 kg)    SpO2 96%    BMI 31.08 kg/m2          Advised him to call back or return to office if symptoms worsen/change/persist.  Discussed expected course/resolution/complications of diagnosis in detail with patient.     Medication risks/benefits/costs/interactions/alternatives discussed with patient. He was given an after visit summary which includes diagnoses, current medications, & vitals. He expressed understanding with the diagnosis and plan. Aspects of this note may have been generated using voice recognition software. Despite editing, there may be some syntax errors.        Rebeca Stephens MD

## 2018-02-20 NOTE — MR AVS SNAPSHOT
[address] [facility] [address] [facility] 
[phone] Patient: Rekha Griffiths MRN: [id number] Visit Information Date & Time Provider Department Dept. Phone Encounter #  
 2/20/2018 10:30 AM Grover Babinski, [facility] [phone] [id number] Follow-up Instructions Return in about 6 months (around 8/20/2018), or if symptoms worsen or fail to improve. Your Appointments 2/20/2018 10:30 AM  
Medicare Physical with Grover Babinski, MD  
[facility] Internal Medicine [facility]) Appt Note: wellness; 1 [address] [place] [address]  
[place] [address] [facility] Upcoming Health Maintenance Date Due  
 MEDICARE YEARLY EXAM 2/15/2018 EYE EXAM RETINAL OR DILATED Q1 6/14/2018 FOOT EXAM Q1 7/6/2018 HEMOGLOBIN A1C Q6M 8/16/2018 MICROALBUMIN Q1 2/16/2019 LIPID PANEL Q1 2/16/2019 GLAUCOMA SCREENING Q2Y 6/14/2019 COLONOSCOPY 9/27/2019 DTaP/Tdap/Td series (2 - Td) 10/22/2022 Allergies as of 2/20/2018  Review Complete On: 2/20/2018 By: Benita Avila RN Severity Noted Reaction Type Reactions Apriso [Mesalamine]  10/25/2016    Diarrhea Prednisone  10/19/2015    Other (comments)  
 constipation Sulfasalazine  10/25/2016    Hives Current Immunizations  Reviewed on 2/20/2018 Name Date Influenza High Dose Vaccine PF 10/1/2017, 9/25/2016, 10/17/2015, 10/1/2014 Influenza Vaccine 9/16/2012 Pneumococcal Conjugate (PCV-13) 7/12/2015 Pneumococcal Polysaccharide (PPSV-23) 9/25/2016 TDAP Vaccine 10/22/2012 ZZZ-RETIRED (DO NOT USE) Pneumococcal Vaccine (Unspecified Type) 10/22/2012 Zoster Vaccine, Live 3/11/2015 Reviewed by Benita Avila RN on 2/20/2018 at  9:45 AM  
You Were Diagnosed With   
  
 Codes Comments Medicare annual wellness visit, subsequent    -  Primary ICD-10-CM: Z00.00 ICD-9-CM: V70.0 ACP (advance care planning)     ICD-10-CM: Z71.89 ICD-9-CM: V65.49 Encounter for immunization     ICD-10-CM: H03 ICD-9-CM: V03.89 Screening for alcoholism     ICD-10-CM: Z13.89 ICD-9-CM: V79.1 Body mass index 31.0-31.9, adult     ICD-10-CM: Z68.31 
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 
ICD-9-CM: 250.60 Ulcerative colitis with rectal bleeding, unspecified location [facility])     ICD-10-CM: K51.911 ICD-9-CM: 556.9 Recurrent major depressive disorder, in partial remission ([facility] [address]     ICD-10-CM: F33.41 
ICD-9-CM: 296.35 Coronary artery disease involving native coronary artery of native heart without angina pectoris     ICD-10-CM: I25.10 ICD-9-CM: 414.01 Essential hypertension, benign     ICD-10-CM: I10 
ICD-9-CM: 401.1 Pure hypercholesterolemia     ICD-10-CM: E78.00 ICD-9-CM: 272.0   
 CARROL (obstructive sleep apnea)     ICD-10-CM: G47.33 
ICD-9-CM: 327.23 Vitals BP Pulse Temp Resp Height(growth percentile) Weight(growth percentile) 104/70 80 97.9 °F (36.6 °C) (Oral) 16 6' 0.1\" (1.831 m) 229 lb 12.8 oz (104.2 kg) SpO2 BMI Smoking Status 96% 31.08 kg/m2 Former Smoker BMI and BSA Data Body Mass Index Body Surface Area 31.08 kg/m 2 2.3 m 2 Preferred Pharm[phone] Your Updated Medication List  
  
   
This list is accurate as of: 18 10:23 AM.  Always use your most recent med list.  
  
  
  
  
 aspirin 81 mg tablet Take 81 mg by mouth nightly. Indications: myocardial infarction prevention  
  
 balsalazide 750 mg capsule Commonly known as:  [place] Take 2,250 mg by mouth three (3) times daily. Blood-Glucose Meter Misc Commonly known as:  TRUE METRIX GLUCOSE METER Use to test blood sugars daily. DX:  E11.49  
  
 dicyclomine 10 mg capsule Commonly known as:  BENTYL Take 10 mg by mouth three (3) times daily as needed. gabapentin 100 mg capsule Commonly known as:  NEURONTIN  
TAKE 1 CAPSULE BY MOUTH THREE TIMES DAILY * glucose blood VI test strips strip Commonly known as:  TRUE METRIX GLUCOSE TEST STRIP Test blood sugars daily. DX: E11.49  
  
 * TRUE METRIX GLUCOSE TEST STRIP strip Generic drug:  glucose blood VI test strips TEST ONCE DAILY AS DIRECTED Lancets & Blood Glucose Strips Cmpk Element glucometer  
  
 lisinopril 10 mg tablet Commonly known as:  Hildy Lovings Take 1 Tab by mouth nightly. melatonin Tab tablet Take 5 mg by mouth nightly. * metFORMIN 500 mg tablet Commonly known as:  GLUCOPHAGE Take 1 Tab by mouth daily. Takes in am.  
  
 * metFORMIN 500 mg tablet Commonly known as:  GLUCOPHAGE Take 2 Tabs by mouth every evening. metoprolol tartrate 25 mg tablet Commonly known as:  LOPRESSOR  
TAKE 1/2 TABLET BY MOUTH TWICE DAILY MULTIVITAMIN PO Take 1 Tab by mouth daily. sertraline 50 mg tablet Commonly known as:  ZOLOFT  
TAKE 1 AND 1/2 TABLETS BY MOUTH DAILY  
  
 simvastatin 40 mg tablet Commonly known as:  ZOCOR  
TAKE 1 TABLET BY MOUTH NIGHTLY THERALITH XR PO Take 2 tablets by mouth two (2) times a day. varicella-zoster recombinant (PF) 50 mcg/0.5 mL Susr injection Commonly known as:  SHINGRIX  
0.5 mL IM once and then repeat in 2-6 months VITAMIN D3 2,000 unit Tab Generic drug:  cholecalciferol (vitamin D3) Take 2,000 Units by mouth daily. * Notice: This list has 4 medication(s) that are the same as other medications prescribed for you. Read the directions carefully, and ask your doctor or other care provider to review them with you. Prescriptions Printed Refills  
 varicella-zoster recombinant, PF, (SHINGRIX) 50 mcg/0.5 mL susr injection 1 Si.5 mL IM once and then repeat in 2-6 months Class: Print We Performed the Following  DIABETES FOOT EXAM [7 Custom] MO ANNUAL ALCOHOL SCREEN 15 MIN [id number] [facility]] Follow-up Instructions Return in about 6 months (around 2018), or if symptoms worsen or fail to improve. Patient Instructions Medicare Wellness Visit, Male The best way to live healthy is to have a healthy lifestyle by eating a well-balanced diet, exercising regularly, limiting alcohol and stopping smoking. Regular physical exams and screening tests are another way to keep healthy. Preventive exams provided by your health care provider can find health problems before they become diseases or illnesses. Preventive services including immunizations, screening tests, monitoring and exams can help you take care of your own health. All people over age 72 should have a pneumovax  and and a prevnar shot to prevent pneumonia. These are once in a lifetime unless you and your provider decide differently. All people over 65 should have a yearly flu shot and a tetanus vaccine every 10 years. Screening for diabetes mellitus with a blood sugar test should be done every year. Glaucoma is a disease of the eye due to increased ocular pressure that can lead to blindness and it should be done every year by an eye professional. 
 
Cardiovascular screening tests that check for elevated lipids (fatty part of blood) which can lead to heart disease and strokes should be done every 5 years. Colorectal screening that evaluates for blood or polyps in your colon should be done yearly as a stool test or every five years as a flexible sigmoidoscope or every 10 years as a colonoscopy up to age 76.  
 
Men up to age 76 may need a screening blood test for prostate cancer at certain intervals, depending on their personal and family history. This decision is between the patient and his provider. If you have been a smoker or had family history of abdominal aortic aneurysms, you and your provider may decide to schedule an ultrasound test of your aorta. Hepatitis C screening is also recommended for anyone born between [birth year] through [address]. A shingles vaccine is also recommended once in a lifetime after age 61. Your Medicare Wellness Exam is recommended annually. Here is a list of your current Health Maintenance items with a due date: 
Health Maintenance Due Topic Date Due  
 Annual Well Visit  02/15/2018 [address] What is a living will? A living will is a legal form you use to write down the kind of care you want at the end of your life. It is used by the health professionals who will treat you if you aren't able to decide for yourself. If you put your wishes in writing, your loved ones and others will know what kind of care you want. They won't need to guess. This can ease your mind and be helpful to others. A living will is not the same as an estate or property will. An estate will explains what you want to happen with your money and property after you die. Is a living will a legal document? A living will is a legal document. Each state has its own laws about living rees. If you move to another state, make sure that your living will is legal in the state where you now live. Or you might use a universal form that has been approved by many states. This kind of form can sometimes be completed and stored online. Your electronic copy will then be available wherever you have a connection to the Internet. In most cases, doctors will respect your wishes even if you have a form from a different state. · You don't need an  to complete a living will.  But legal advice can be helpful if your state's laws are unclear, your health history is complicated, or your family can't agree on what should be in your living will. · You can change your living will at any time. Some people find that their wishes about end-of-life care change as their health changes. · In addition to making a living will, think about completing a medical power of  form. This form lets you name the person you want to make end-of-life treatment decisions for you (your \"health care agent\") if you're not able to. Many hospitals and nursing homes will give you the forms you need to complete a living will and a medical power of . · Your living will is used only if you can't make or communicate decisions for yourself anymore. If you become able to make decisions again, you can accept or refuse any treatment, no matter what you wrote in your living will. · Your state may offer an online registry. This is a place where you can store your living will online so the doctors and nurses who need to treat you can find it right away. What should you think about when creating a living will? Talk about your end-of-life wishes with your family members and your doctor. Let them know what you want. That way the people making decisions for you won't be surprised by your choices. Think about these questions as you make your living will: · Do you know enough about life support methods that might be used? If not, talk to your doctor so you know what might be done if you can't breathe on your own, your heart stops, or you're unable to swallow. · What things would you still want to be able to do after you receive life-support methods? Would you want to be able to walk? To speak? To eat on your own? To live without the help of machines? · If you have a choice, where do you want to be cared for? In your home? At a hospital or nursing home? · Do you want certain Jewish practices performed if you become very ill? · If you have a choice at the end of your life, where would you prefer to die? At home? In a hospital or nursing home? Somewhere else? · Would you prefer to be buried or cremated? · Do you want your organs to be donated after you die? What should you do with your living will? · Make sure that your family members and your health care agent have copies of your living will. · Give your doctor a copy of your living will to keep in your medical record. If you have more than one doctor, make sure that each one has a copy. · You may want to put a copy of your living will where it can be easily found. Where can you learn more? Go to http://jesús-jhoana.info/. Enter A590 in the search box to learn more about \"Learning About Living Joann Bonilla. \" Current as of: August 8, 2016 Content Version: 11.3 © 8565-1395 [employer]. Care instructions adapted under license by [employer] (which disclaims liability or warranty for this information). If you have questions about a medical condition or this instruction, always ask your healthcare professional. [address] any warranty or liability for your use of this information. Introducing [facility] & HEALTH SERVICES! Melba Jackson introduces DocLanding patient portal. Now you can access parts of your medical record, email your doctor's office, and request medication refills online. 1. In your internet browser, go to [email or web address] [email or web address] Me ([email or web address]). [email or web address]/Next One's On Me (NOOM) 2. Click on the First Time User? Click Here link in the Sign In box. You will see the New Member Sign Up page. 3. Enter your DocLanding Access Code exactly as it appears below. You will not need to use this code after youve completed the sign-up process. If you do not sign up before the expiration date, you must request a new code. · DocLanding Access Code: [email or web address] Expires: 5/21/2018  9:25 AM 
 
 4. Enter the last four digits of your Social Security Number (xxxx) and Date of Birth (mm/dd/yyyy) as indicated and click Submit. You will be taken to the next sign-up page. 5. Create a CloudRunner I/O ID. This will be your CloudRunner I/O login ID and cannot be changed, so think of one that is secure and easy to remember. 6. Create a CloudRunner I/O password. You can change your password at any time. 7. Enter your Password Reset Question and Answer. This can be used at a later time if you forget your password. 8. Enter your e-mail address. You will receive e-mail notification when new information is available in [address] 9. Click Sign Up. You can now view and download portions of your medical record. 10. Click the Download Summary menu link to download a portable copy of your medical information. If you have questions, please visit the Frequently Asked Questions section of the CloudRunner I/O website. Remember, CloudRunner I/O is NOT to be used for urgent needs. For medical emergencies, dial 911. Now available from your iPhone and Android! Please provide this summary of care documentation to your next provider. Your primary care clinician is listed as Art Brake. If you have any questions after today's visit, please call [phone].

## 2018-02-20 NOTE — PATIENT INSTRUCTIONS
Medicare Wellness Visit, Male    The best way to live healthy is to have a healthy lifestyle by eating a well-balanced diet, exercising regularly, limiting alcohol and stopping smoking. Regular physical exams and screening tests are another way to keep healthy. Preventive exams provided by your health care provider can find health problems before they become diseases or illnesses. Preventive services including immunizations, screening tests, monitoring and exams can help you take care of your own health. All people over age 72 should have a pneumovax  and and a prevnar shot to prevent pneumonia. These are once in a lifetime unless you and your provider decide differently. All people over 65 should have a yearly flu shot and a tetanus vaccine every 10 years. Screening for diabetes mellitus with a blood sugar test should be done every year. Glaucoma is a disease of the eye due to increased ocular pressure that can lead to blindness and it should be done every year by an eye professional.    Cardiovascular screening tests that check for elevated lipids (fatty part of blood) which can lead to heart disease and strokes should be done every 5 years. Colorectal screening that evaluates for blood or polyps in your colon should be done yearly as a stool test or every five years as a flexible sigmoidoscope or every 10 years as a colonoscopy up to age 76. Men up to age 76 may need a screening blood test for prostate cancer at certain intervals, depending on their personal and family history. This decision is between the patient and his provider. If you have been a smoker or had family history of abdominal aortic aneurysms, you and your provider may decide to schedule an ultrasound test of your aorta. Hepatitis C screening is also recommended for anyone born between [birth year] through [address]. A shingles vaccine is also recommended once in a lifetime after age 61.     Your Medicare Wellness Exam is recommended annually. Here is a list of your current Health Maintenance items with a due date:  Health Maintenance Due   Topic Date Due    Annual Well Visit  02/15/2018            Learning About Living [place]  What is a living will? A living will is a legal form you use to write down the kind of care you want at the end of your life. It is used by the health professionals who will treat you if you aren't able to decide for yourself. If you put your wishes in writing, your loved ones and others will know what kind of care you want. They won't need to guess. This can ease your mind and be helpful to others. A living will is not the same as an estate or property will. An estate will explains what you want to happen with your money and property after you die. Is a living will a legal document? A living will is a legal document. Each state has its own laws about living rees. If you move to another state, make sure that your living will is legal in the state where you now live. Or you might use a universal form that has been approved by many states. This kind of form can sometimes be completed and stored online. Your electronic copy will then be available wherever you have a connection to the Internet. In most cases, doctors will respect your wishes even if you have a form from a different state. · You don't need an  to complete a living will. But legal advice can be helpful if your state's laws are unclear, your health history is complicated, or your family can't agree on what should be in your living will. · You can change your living will at any time. Some people find that their wishes about end-of-life care change as their health changes. · In addition to making a living will, think about completing a medical power of  form. This form lets you name the person you want to make end-of-life treatment decisions for you (your \"health care agent\") if you're not able to.  Many hospitals and nursing [facility] will give you the forms you need to complete a living will and a medical power of . · Your living will is used only if you can't make or communicate decisions for yourself anymore. If you become able to make decisions again, you can accept or refuse any treatment, no matter what you wrote in your living will. · Your state may offer an online registry. This is a place where you can store your living will online so the doctors and nurses who need to treat you can find it right away. What should you think about when creating a living will? Talk about your end-of-life wishes with your family members and your doctor. Let them know what you want. That way the people making decisions for you won't be surprised by your choices. Think about these questions as you make your living will:  · Do you know enough about life support methods that might be used? If not, talk to your doctor so you know what might be done if you can't breathe on your own, your heart stops, or you're unable to swallow. · What things would you still want to be able to do after you receive life-support methods? Would you want to be able to walk? To speak? To eat on your own? To live without the help of machines? · If you have a choice, where do you want to be cared for? In your home? At a hospital or nursing home? · Do you want certain Sikh practices performed if you become very ill? · If you have a choice at the end of your life, where would you prefer to die? At home? In a hospital or nursing home? Somewhere else? · Would you prefer to be buried or cremated? · Do you want your organs to be donated after you die? What should you do with your living will? · Make sure that your family members and your health care agent have copies of your living will. · Give your doctor a copy of your living will to keep in your medical record. If you have more than one doctor, make sure that each one has a copy.   · You may want to put a copy of your living will where it can be easily found. Where can you learn more? Go to http://jesús-jhoana.info/. Enter T165 in the search box to learn more about \"Learning About Living Perrodayday. \"  Current as of: August 8, 2016  Content Version: 11.3  © 2660-3836 "[employer]. Care instructions adapted under license by [employer] (which disclaims liability or warranty for this information). If you have questions about a medical condition or this instruction, always ask your healthcare professional. [address] any warranty or liability for your use of this information.

## 2018-04-03 DIAGNOSIS — F33.1 MODERATE EPISODE OF RECURRENT MAJOR DEPRESSIVE DISORDER (HCC): ICD-10-CM

## 2018-04-03 RX ORDER — SERTRALINE HYDROCHLORIDE 50 MG/1
TABLET, FILM COATED ORAL
Qty: 135 TAB | Refills: 1 | Status: SHIPPED | OUTPATIENT
Start: 2018-04-03 | End: 2018-10-04 | Stop reason: SDUPTHER

## 2018-04-05 RX ORDER — GABAPENTIN 100 MG/1
CAPSULE ORAL
Qty: 270 CAP | Refills: 1 | Status: SHIPPED | OUTPATIENT
Start: 2018-04-05 | End: 2018-10-02 | Stop reason: SDUPTHER

## 2018-07-27 ENCOUNTER — TELEPHONE (OUTPATIENT)
Dept: INTERNAL MEDICINE CLINIC | Age: 78
End: 2018-07-27

## 2018-07-27 RX ORDER — METFORMIN HYDROCHLORIDE 500 MG/1
TABLET, EXTENDED RELEASE ORAL
Qty: 270 TAB | Refills: 0 | Status: SHIPPED | OUTPATIENT
Start: 2018-07-27 | End: 2018-10-30 | Stop reason: SDUPTHER

## 2018-07-27 NOTE — TELEPHONE ENCOUNTER
Called pt to verify pt c/o of diarrhea. And pt states he has had diarrhea for the past 6-8 mos off and on but on mostly. Pt state his GI specialist stated it could be due to the pt taking Metformin.

## 2018-07-27 NOTE — TELEPHONE ENCOUNTER
Called the pt back and informed him Metformin XR has been sent in for him and to stop the metformin he is taking now. And pt will know in 7-10 days if the metformin he was taking was causing his diarrhea. Pt verbalized understanding.

## 2018-07-27 NOTE — TELEPHONE ENCOUNTER
[facility] (Pharmacy) [phone]     Pt reported to GI Dr. Radha Miles with diarrhea and GI suspects may be due to metformin. Pt called pharmacy and pharmacy called to see if Dr. Dimitris Morgan would switch metformin to extended release to see if this helps the patient.   Please advise

## 2018-07-27 NOTE — TELEPHONE ENCOUNTER
Yes, we can transition to MTF XR. However, just stop the MTF. His sugars will go up, but in 7-10 days he should know if the medication is the cause of diarrhea. I have refilled meds for him.

## 2018-08-10 RX ORDER — LISINOPRIL 10 MG/1
10 TABLET ORAL
Qty: 90 TAB | Refills: 1 | Status: SHIPPED | OUTPATIENT
Start: 2018-08-10 | End: 2018-08-20 | Stop reason: SDUPTHER

## 2018-08-20 ENCOUNTER — OFFICE VISIT (OUTPATIENT)
Dept: INTERNAL MEDICINE CLINIC | Age: 78
End: 2018-08-20

## 2018-08-20 VITALS
BODY MASS INDEX: 30.18 KG/M2 | DIASTOLIC BLOOD PRESSURE: 76 MMHG | HEIGHT: 72 IN | SYSTOLIC BLOOD PRESSURE: 102 MMHG | WEIGHT: 222.8 LBS | RESPIRATION RATE: 16 BRPM | HEART RATE: 60 BPM | TEMPERATURE: 97.9 F | OXYGEN SATURATION: 98 %

## 2018-08-20 DIAGNOSIS — I10 ESSENTIAL HYPERTENSION, BENIGN: ICD-10-CM

## 2018-08-20 DIAGNOSIS — E11.49 CONTROLLED TYPE 2 DIABETES MELLITUS WITH OTHER NEUROLOGIC COMPLICATION, WITHOUT LONG-TERM CURRENT USE OF INSULIN (HCC): Primary | ICD-10-CM

## 2018-08-20 DIAGNOSIS — F33.41 RECURRENT MAJOR DEPRESSIVE DISORDER, IN PARTIAL REMISSION (HCC): ICD-10-CM

## 2018-08-20 DIAGNOSIS — E78.00 PURE HYPERCHOLESTEROLEMIA: ICD-10-CM

## 2018-08-20 DIAGNOSIS — I25.10 CORONARY ARTERY DISEASE INVOLVING NATIVE CORONARY ARTERY OF NATIVE HEART WITHOUT ANGINA PECTORIS: ICD-10-CM

## 2018-08-20 DIAGNOSIS — K51.911 ULCERATIVE COLITIS WITH RECTAL BLEEDING, UNSPECIFIED LOCATION (HCC): ICD-10-CM

## 2018-08-20 RX ORDER — MESALAMINE 800 MG/1
1600 TABLET, DELAYED RELEASE ORAL 3 TIMES DAILY
COMMUNITY
End: 2020-09-15 | Stop reason: CLARIF

## 2018-08-20 RX ORDER — LISINOPRIL 10 MG/1
5 TABLET ORAL
Qty: 90 TAB | Refills: 1
Start: 2018-08-20 | End: 2019-02-19 | Stop reason: SDUPTHER

## 2018-08-20 NOTE — PROGRESS NOTES
Araceli Arzate is a 69 y.o. male who was seen in clinic today (8/20/2018). Assessment & Plan:   Diagnoses and all orders for this visit:    1. 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 -     METABOLIC PANEL, COMPREHENSIVE  -     HEMOGLOBIN A1C WITH EAG  -      DIABETES FOOT EXAM  -     lisinopril (PRINIVIL, ZESTRIL) 10 mg tablet; Take 0.5 Tabs by mouth nightly. 2. Coronary artery disease involving native coronary artery of native heart without angina pectoris- asymptomatic. BP is to well controlled, see below, lipids are well controlled. -     METABOLIC PANEL, COMPREHENSIVE  -     lisinopril (PRINIVIL, ZESTRIL) 10 mg tablet; Take 0.5 Tabs by mouth nightly. 3. Essential hypertension, benign- likely to well controlled, continue current treatment but will decrease lisinopril from 10mg to 5mg  -     METABOLIC PANEL, COMPREHENSIVE  -     lisinopril (PRINIVIL, ZESTRIL) 10 mg tablet; Take 0.5 Tabs by mouth nightly. 4. Pure hypercholesterolemia- at goal, continue current treatment   -     METABOLIC PANEL, COMPREHENSIVE    5. Recurrent major depressive disorder, in partial remission ([facility].)- well controlled, reviewed treatment options with him, reviewed life style changes to help improve mood, continue current treatment. 6. Ulcerative colitis with rectal bleeding, unspecified location [facility])- unclear what part MTF is playing in GI issues vs UC, feels improved so will cont w/ MTF XR, he reports having f/u c-scope in a few months. improved, congratulated, I have reviewed/discussed the above normal BMI with the patient. I have recommended the following interventions: encourage exercise and lifestyle education regarding diet. Follow-up Disposition:  Return in about 6 months (around 2/20/2019) for FULL PHYSICAL - 30 minutes. Subjective:   Charles Martínez was seen today for Diabetes and Depression    Endocrine Review  He is seen for diabetes & neuropathy.  Since last visit he reports: he was having some issues w/ diarrhea, MTF changed to MTF XR w/ some slight improvement.  Home glucose monitoring: is performed sporadically, fasting values range 120-130's.  He is checking his sugars twice per week. BP diary was not reviewed.  He reports medication compliance: compliant all of the time.  Diabetic diet compliance: compliant all of the time.  Lab review: labs reviewed and discussed with patient.  Eye exam: UTD.     Cardiovascular Review  The patient has CAD, hypertension, and hyperlipidemia.  Since last visit: no changes.  He reports taking medications as instructed, no medication side effects noted, patient does not perform home BP monitoring.  Diet and Lifestyle: generally follows a low fat low cholesterol diet, generally follows a low sodium diet, sedentary.  Labs: reviewed and discussed with patient.      Mental Health Review  Patient is seen for depression.  Since last visit:  Shanti Officer has a friend move in, there are now two 21 something living in the house. Ongoing depression symptoms include: anhedonia, and \"short fuse\" but improved.  PHQ-9 reviewed.  Reports experiences the following side effects from the treatment: none.         Brief Labs:     Lab Results   Component Value Date/Time    Sodium 140 02/16/2018 10:12 AM    Potassium 4.5 02/16/2018 10:12 AM    Creatinine 0.87 02/16/2018 10:12 AM    Cholesterol, total 125 02/16/2018 10:12 AM    HDL Cholesterol 46 02/16/2018 10:12 AM    LDL, calculated 48 02/16/2018 10:12 AM    Triglyceride 153 02/16/2018 10:12 AM    Hemoglobin A1c 6.5 02/16/2018 10:12 AM          Prior to Admission medications    Medication Sig Start Date End Date Taking?  Authorizing Provider   mesalamine DR (ASACOL HD) 800 mg DR tablet Take 1,600 mg by mouth three (3) times daily. Yes Historical Provider   lisinopril (PRINIVIL, ZESTRIL) 10 mg tablet Take 1 Tab by mouth nightly. 8/10/18  Yes Rakesh Grider MD   metFORMIN ER (GLUCOPHAGE XR) 500 mg tablet 1 tab PO in AM and 2 tabs PO in PM 7/27/18  Yes Rakesh Grider MD   metoprolol tartrate (LOPRESSOR) 25 mg tablet TAKE 1/2 TABLET BY MOUTH TWICE DAILY 5/15/18  Yes Rakesh Grider MD   gabapentin (NEURONTIN) 100 mg capsule TAKE 1 CAPSULE BY MOUTH THREE TIMES DAILY 4/5/18  Yes Rakesh Grider MD   simvastatin (ZOCOR) 40 mg tablet TAKE 1 TABLET BY MOUTH NIGHTLY 4/4/18  Yes Rakesh Gridre MD   sertraline (ZOLOFT) 50 mg tablet TAKE 1 AND 1/2 TABLETS BY MOUTH DAILY 4/3/18  Yes Rakesh Grider MD   dicyclomine (BENTYL) 10 mg capsule Take 10 mg by mouth three (3) times daily as needed. Yes Historical Provider   TRUE METRIX GLUCOSE TEST STRIP strip TEST ONCE DAILY AS DIRECTED 11/29/17  Yes Rakesh Grider MD   MULTIVITAMIN PO Take 1 Tab by mouth daily. Yes Historical Provider   cholecalciferol, vitamin D3, (VITAMIN D3) 2,000 unit tab Take 2,000 Units by mouth daily. Yes Historical Provider   melatonin tab tablet Take 5 mg by mouth nightly. Yes Historical Provider   Blood-Glucose Meter (TRUE METRIX GLUCOSE METER) misc Use to test blood sugars daily. DX:  E11.49 11/10/16  Yes Rakesh Grider MD   glucose blood VI test strips (TRUE METRIX GLUCOSE TEST STRIP) strip Test blood sugars daily. DX: E11.49 11/10/16  Yes Rakesh Grider MD   VIT B6/MAG CIT & OX/POTASS CIT (THERALITH XR PO) Take 2 tablets by mouth two (2) times a day. Yes Historical Provider   Lancets & Blood Glucose Strips Cmpk Element glucometer 2/26/13  Yes Shahid Bryant MD   aspirin 81 mg tablet Take 81 mg by mouth nightly.  Indications: myocardial infarction prevention   Yes Historical Provider          Allergies   Allergen Reactions    Apriso [Mesalamine] Diarrhea    Prednisone Other (comments)     constipation    Sulfasalazine Hives           Review of Systems   Constitutional: Positive for weight loss. Negative for malaise/fatigue. Respiratory: Negative for cough and shortness of breath. Cardiovascular: Negative for chest pain, palpitations and leg swelling. Gastrointestinal: Negative for abdominal pain, constipation, diarrhea, heartburn, nausea and vomiting. Musculoskeletal: Negative for joint pain and myalgias. Skin: Negative for rash. Neurological: Positive for tingling. Negative for sensory change, focal weakness and headaches. Psychiatric/Behavioral: Negative for depression. The patient is not nervous/anxious and does not have insomnia. Objective:   Physical Exam   Constitutional: No distress. obese   HENT:   Mouth/Throat: Mucous membranes are normal.   Eyes: Conjunctivae are normal. No scleral icterus. Neck: Neck supple. Cardiovascular: Regular rhythm and normal heart sounds. Bradycardia present. No murmur heard. Pulmonary/Chest: Effort normal and breath sounds normal. He has no wheezes. He has no rales. Abdominal: Soft. Bowel sounds are normal. He exhibits no mass. There is no hepatosplenomegaly. There is no tenderness. Musculoskeletal: He exhibits no edema. Lymphadenopathy:     He has no cervical adenopathy. Neurological:   Left Foot:   Visual Exam: normal    Pulse DP: 1+ (weak)   Filament test: normal sensation   Right Foot:   Visual Exam: normal    Pulse DP: 1+ (weak)   Filament test: normal sensation     Skin: No rash noted. Psychiatric: He has a normal mood and affect.  His behavior is normal.         Visit Vitals    /76    Pulse 60    Temp 97.9 °F (36.6 °C) (Oral)    Resp 16    Ht 6' 0.1\" (1.831 m)    Wt 222 lb 12.8 oz (101.1 kg)    SpO2 98%    BMI 30.13 kg/m2         Disclaimer:  Advised him to call back or return to office if symptoms worsen/change/persist.  Discussed expected course/resolution/complications of diagnosis in detail with patient. Medication risks/benefits/costs/interactions/alternatives discussed with patient. He was given an after visit summary which includes diagnoses, current medications, & vitals. He expressed understanding with the diagnosis and plan. Aspects of this note may have been generated using voice recognition software. Despite editing, there may be some syntax errors.        Jose Luis Rao MD

## 2018-08-20 NOTE — MR AVS SNAPSHOT
[address] 
[phone] Patient: Lulú Montilla MRN: [id number] Visit Information Date & Time Provider Department Dept. Phone Encounter #  
 8/20/2018  7:30 AM Galilea Miles, [facility] [phone] Follow-up Instructions Return in about 6 months (around 2/20/2019) for FULL PHYSICAL - 30 minutes. Upcoming Health Maintenance Date Due  
 EYE EXAM RETINAL OR DILATED Q1 6/14/2018 Influenza Age 5 to Adult 8/1/2018 HEMOGLOBIN A1C Q6M 8/16/2018 MICROALBUMIN Q1 2/16/2019 LIPID PANEL Q1 2/16/2019 FOOT EXAM Q1 2/20/2019 MEDICARE YEARLY EXAM 2/21/2019 GLAUCOMA SCREENING Q2Y 6/14/2019 COLONOSCOPY 9/27/2019 DTaP/Tdap/Td series (2 - Td) 10/22/2022 Allergies as of 8/20/2018  Review Complete On: 8/20/2018 By: Galilea Miles MD  
  
 Severity Noted Reaction Type Reactions Apriso [Mesalamine]  10/25/2016    Diarrhea Prednisone  10/19/2015    Other (comments)  
 constipation Sulfasalazine  10/25/2016    Hives Current Immunizations  Reviewed on 8/20/2018 Name Date Influenza High Dose Vaccine PF 10/1/2017, 9/25/2016, 10/17/2015, 10/1/2014 Influenza Vaccine 9/16/2012 Pneumococcal Conjugate (PCV-13) 7/12/2015 Pneumococcal Polysaccharide (PPSV-23) 9/25/2016 TDAP Vaccine 10/22/2012 ZZZ-RETIRED (DO NOT USE) Pneumococcal Vaccine (Unspecified Type) 10/22/2012 Zoster Vaccine, Live 3/11/2015 Reviewed by Juan Parks RN on 8/20/2018 at  7:36 AM  
You Were Diagnosed With   
  
 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 
ICD-9-CM: 250.60 Coronary artery disease involving native coronary artery of native heart without angina pectoris     ICD-10-CM: I25.10 ICD-9-CM: 414.01 Essential hypertension, benign     ICD-10-CM: I10 
ICD-9-CM: 401.1 Pure hypercholesterolemia     ICD-10-CM: E78.00 ICD-9-CM: 272.0 Recurrent major depressive disorder, in partial remission ([facility]     ICD-10-CM: F33.41 
ICD-9-CM: 296.35 Ulcerative colitis with rectal bleeding, unspecified location [facility])     ICD-10-CM: K51.911 ICD-9-CM: 662. 9 Vitals BP Pulse Temp Resp Height(growth percentile) Weight(growth percentile) 102/76 60 97.9 °F (36.6 °C) (Oral) 16 6' 0.1\" (1.831 m) 222 lb 12.8 oz (101.1 kg) SpO2 BMI Smoking Status 98% 30.13 kg/m2 Former Smoker BMI and BSA Data Body Mass Index Body Surface Area  
 30.13 kg/m 2 2.27 m 2 Preferred Pharmacy Pharmacy Name Phone [address], Jayson Schultz [address] [place] [address] [phone] Your Updated Medication List  
  
   
This list is accurate as of 18  7:53 AM.  Always use your most recent med list.  
  
  
  
  
 aspirin 81 mg tablet Take 81 mg by mouth nightly. Indications: myocardial infarction prevention Blood-Glucose Meter Misc Commonly known as:  TRUE METRIX GLUCOSE METER Use to test blood sugars daily. DX:  E11.49  
  
 dicyclomine 10 mg capsule Commonly known as:  BENTYL Take 10 mg by mouth three (3) times daily as needed. gabapentin 100 mg capsule Commonly known as:  NEURONTIN  
TAKE 1 CAPSULE BY MOUTH THREE TIMES DAILY * glucose blood VI test strips strip Commonly known as:  TRUE METRIX GLUCOSE TEST STRIP Test blood sugars daily. DX: E11.49  
  
 * TRUE METRIX GLUCOSE TEST STRIP strip Generic drug:  glucose blood VI test strips TEST ONCE DAILY AS DIRECTED Lancets & Blood Glucose Strips Cmpk Element glucometer  
  
 lisinopril 10 mg tablet Commonly known as:  Aundra Pj Take 0.5 Tabs by mouth nightly. melatonin Tab tablet Take 5 mg by mouth nightly. mesalamine  mg DR tablet Commonly known as:  ASACOL HD Take 1,600 mg by mouth three (3) times daily. metFORMIN  mg tablet Commonly known as:  GLUCOPHAGE XR  
1 tab PO in AM and 2 tabs PO in PM  
  
 metoprolol tartrate 25 mg tablet Commonly known as:  LOPRESSOR  
TAKE 1/2 TABLET BY MOUTH TWICE DAILY MULTIVITAMIN PO Take 1 Tab by mouth daily. sertraline 50 mg tablet Commonly known as:  ZOLOFT  
TAKE 1 AND 1/2 TABLETS BY MOUTH DAILY  
  
 simvastatin 40 mg tablet Commonly known as:  ZOCOR  
TAKE 1 TABLET BY MOUTH NIGHTLY THERALITH XR PO Take 2 tablets by mouth two (2) times a day. VITAMIN D3 2,000 unit Tab Generic drug:  cholecalciferol (vitamin D3) Take 2,000 Units by mouth daily. * Notice: This list has 2 medication(s) that are the same as other medications prescribed for you. Read the directions carefully, and ask your doctor or other care provider to review them with you. We Performed the Following HEMOGLOBIN A1C WITH EAG [41874 CPT(R)]  DIABETES FOOT EXAM [HM7 Custom] METABOLIC PANEL, COMPREHENSIVE [64973 CPT(R)] Follow-up Instructions Return in about 6 months (around 2/20/2019) for FULL PHYSICAL - 30 minutes. Introducing [facility]! Isra Guerrero introduces [employer] patient portal. Now you can access parts of your medical record, email your doctor's office, and request medication refills online. 1. In your internet browser, go to [email or web address] 2. Click on the First Time User? Click Here link in the Sign In box. You will see the New Member Sign Up page. 3. Enter your [employer] Access Code exactly as it appears below. You will not need to use this code after youve completed the sign-up process. If you do not sign up before the expiration date, you must request a new code. · [employer] Access Code: [email or web address] Expires: 11/18/2018  7:53 AM 
 
 4. Enter the last four digits of your Social Security Number (xxxx) and Date of Birth (mm/dd/yyyy) as indicated and click Submit. You will be taken to the next sign-up page. 5. Create a [employer] ID. This will be your [employer] login ID and cannot be changed, so think of one that is secure and easy to remember. 6. Create a [employer] password. You can change your password at any time. 7. Enter your Password Reset Question and Answer. This can be used at a later time if you forget your password. 8. Enter your e-mail address. You will receive e-mail notification when new information is available in [address] 9. Click Sign Up. You can now view and download portions of your medical record. 10. Click the Download Summary menu link to download a portable copy of your medical information. If you have questions, please visit the Frequently Asked Questions section of the [employer] website. Remember, [employer] is NOT to be used for urgent needs. For medical emergencies, dial 911. Now available from your iPhone and Android! Please provide this summary of care documentation to your next provider. Your primary care clinician is listed as Vinh Larsen. If you have any questions after today's visit, please call [phone].

## 2018-08-21 ENCOUNTER — TELEPHONE (OUTPATIENT)
Dept: INTERNAL MEDICINE CLINIC | Age: 78
End: 2018-08-21

## 2018-08-21 DIAGNOSIS — E11.49 CONTROLLED TYPE 2 DIABETES MELLITUS WITH OTHER NEUROLOGIC COMPLICATION, WITHOUT LONG-TERM CURRENT USE OF INSULIN (HCC): ICD-10-CM

## 2018-08-21 DIAGNOSIS — I10 ESSENTIAL HYPERTENSION, BENIGN: ICD-10-CM

## 2018-08-21 DIAGNOSIS — I25.10 CORONARY ARTERY DISEASE INVOLVING NATIVE CORONARY ARTERY OF NATIVE HEART WITHOUT ANGINA PECTORIS: ICD-10-CM

## 2018-08-21 LAB
ALBUMIN SERPL-MCNC: 4.2 G/DL (ref 3.5–4.8)
ALBUMIN/GLOB SERPL: 1.9 {RATIO} (ref 1.2–2.2)
ALP SERPL-CCNC: 73 IU/L (ref 39–117)
ALT SERPL-CCNC: 30 IU/L (ref 0–44)
AST SERPL-CCNC: 30 IU/L (ref 0–40)
BILIRUB SERPL-MCNC: 0.3 MG/DL (ref 0–1.2)
BUN SERPL-MCNC: 10 MG/DL (ref 8–27)
BUN/CREAT SERPL: 11 (ref 10–24)
CALCIUM SERPL-MCNC: 9.5 MG/DL (ref 8.6–10.2)
CHLORIDE SERPL-SCNC: 98 MMOL/L (ref 96–106)
CO2 SERPL-SCNC: 22 MMOL/L (ref 20–29)
CREAT SERPL-MCNC: 0.93 MG/DL (ref 0.76–1.27)
EST. AVERAGE GLUCOSE BLD GHB EST-MCNC: 148 MG/DL
GLOBULIN SER CALC-MCNC: 2.2 G/DL (ref 1.5–4.5)
GLUCOSE SERPL-MCNC: 116 MG/DL (ref 65–99)
HBA1C MFR BLD: 6.8 % (ref 4.8–5.6)
POTASSIUM SERPL-SCNC: 4.5 MMOL/L (ref 3.5–5.2)
PROT SERPL-MCNC: 6.4 G/DL (ref 6–8.5)
SODIUM SERPL-SCNC: 139 MMOL/L (ref 134–144)

## 2018-08-21 RX ORDER — LISINOPRIL 10 MG/1
5 TABLET ORAL
Qty: 90 TAB | Refills: 1 | Status: CANCELLED | OUTPATIENT
Start: 2018-08-21

## 2018-08-21 RX ORDER — LISINOPRIL 5 MG/1
5 TABLET ORAL DAILY
Qty: 90 TAB | Refills: 1 | Status: SHIPPED | OUTPATIENT
Start: 2018-08-21 | End: 2019-02-18 | Stop reason: SDUPTHER

## 2018-08-21 NOTE — TELEPHONE ENCOUNTER
Please send new script for Lisinopril to [facility]. It was created but not sent to the pharmacy. Also, pt would like lab results sent to Dr. Ronnie Kruse. Fax number is [phone].

## 2018-08-21 NOTE — TELEPHONE ENCOUNTER
Okay to fax labs/notes to GI. I thought he had enough lisinopril which is why we just updated me list.  Okay to send in 5mg tabs, #90, RF 1. Notify pt that 10mg tabs are on $4 med list, I'm not sure 5mg is. So if there is a cost issue let us know.

## 2018-09-18 ENCOUNTER — ANESTHESIA EVENT (OUTPATIENT)
Dept: ENDOSCOPY | Age: 78
End: 2018-09-18
Payer: MEDICARE

## 2018-09-18 NOTE — ANESTHESIA PREPROCEDURE EVALUATION
Anesthetic History No history of anesthetic complications Review of Systems / Medical History Patient summary reviewed, nursing notes reviewed and pertinent labs reviewed Pulmonary Sleep apnea: CPAP Neuro/Psych Psychiatric history Cardiovascular Hypertension CAD, cardiac stents, CABG and hyperlipidemia GI/Hepatic/Renal 
Within defined limits Endo/Other Diabetes: well controlled, type 2 Obesity Other Findings Physical Exam 
 
Airway Mallampati: II 
TM Distance: > 6 cm Neck ROM: normal range of motion Mouth opening: Normal 
 
 Cardiovascular Regular rate and rhythm,  S1 and S2 normal,  no murmur, click, rub, or gallop Dental 
No notable dental hx Pulmonary Breath sounds clear to auscultation Abdominal 
GI exam deferred Other Findings Anesthetic Plan ASA: 3 Anesthesia type: MAC Induction: Intravenous Anesthetic plan and risks discussed with: Patient

## 2018-09-19 ENCOUNTER — ANESTHESIA (OUTPATIENT)
Dept: ENDOSCOPY | Age: 78
End: 2018-09-19
Payer: MEDICARE

## 2018-09-19 ENCOUNTER — HOSPITAL ENCOUNTER (OUTPATIENT)
Age: 78
Setting detail: OUTPATIENT SURGERY
Discharge: HOME | End: 2018-09-19
Attending: SPECIALIST | Admitting: SPECIALIST
Payer: MEDICARE

## 2018-09-19 VITALS
SYSTOLIC BLOOD PRESSURE: 117 MMHG | HEART RATE: 70 BPM | TEMPERATURE: 97.4 F | RESPIRATION RATE: 10 BRPM | OXYGEN SATURATION: 98 % | DIASTOLIC BLOOD PRESSURE: 63 MMHG

## 2018-09-19 PROCEDURE — 74011000258 HC RX REV CODE- 258

## 2018-09-19 PROCEDURE — 88305 TISSUE EXAM BY PATHOLOGIST: CPT | Performed by: SPECIALIST

## 2018-09-19 PROCEDURE — 77030027957 HC TBNG IRR ENDOGTR BUSS -B: Performed by: SPECIALIST

## 2018-09-19 PROCEDURE — 77030009426 HC FCPS BIOP ENDOSC BSC -B: Performed by: SPECIALIST

## 2018-09-19 PROCEDURE — 76040000019: Performed by: SPECIALIST

## 2018-09-19 PROCEDURE — 74011250636 HC RX REV CODE- 250/636: Performed by: SPECIALIST

## 2018-09-19 PROCEDURE — 74011250636 HC RX REV CODE- 250/636

## 2018-09-19 PROCEDURE — 76060000031 HC ANESTHESIA FIRST 0.5 HR: Performed by: SPECIALIST

## 2018-09-19 RX ORDER — MIDAZOLAM HYDROCHLORIDE 1 MG/ML
.25-1 INJECTION, SOLUTION INTRAMUSCULAR; INTRAVENOUS
Status: DISCONTINUED | OUTPATIENT
Start: 2018-09-19 | End: 2018-09-19 | Stop reason: HOSPADM

## 2018-09-19 RX ORDER — EPINEPHRINE 0.1 MG/ML
1 INJECTION INTRACARDIAC; INTRAVENOUS
Status: DISCONTINUED | OUTPATIENT
Start: 2018-09-19 | End: 2018-09-19 | Stop reason: HOSPADM

## 2018-09-19 RX ORDER — NALOXONE HYDROCHLORIDE 0.4 MG/ML
0.4 INJECTION, SOLUTION INTRAMUSCULAR; INTRAVENOUS; SUBCUTANEOUS
Status: DISCONTINUED | OUTPATIENT
Start: 2018-09-19 | End: 2018-09-19 | Stop reason: HOSPADM

## 2018-09-19 RX ORDER — SODIUM CHLORIDE 0.9 % (FLUSH) 0.9 %
5-10 SYRINGE (ML) INJECTION AS NEEDED
Status: DISCONTINUED | OUTPATIENT
Start: 2018-09-19 | End: 2018-09-19 | Stop reason: HOSPADM

## 2018-09-19 RX ORDER — DEXTROMETHORPHAN/PSEUDOEPHED 2.5-7.5/.8
1.2 DROPS ORAL
Status: DISCONTINUED | OUTPATIENT
Start: 2018-09-19 | End: 2018-09-19 | Stop reason: HOSPADM

## 2018-09-19 RX ORDER — FLUMAZENIL 0.1 MG/ML
0.2 INJECTION INTRAVENOUS
Status: DISCONTINUED | OUTPATIENT
Start: 2018-09-19 | End: 2018-09-19 | Stop reason: HOSPADM

## 2018-09-19 RX ORDER — PROPOFOL 10 MG/ML
INJECTION, EMULSION INTRAVENOUS AS NEEDED
Status: DISCONTINUED | OUTPATIENT
Start: 2018-09-19 | End: 2018-09-19 | Stop reason: HOSPADM

## 2018-09-19 RX ORDER — ATROPINE SULFATE 0.1 MG/ML
0.5 INJECTION INTRAVENOUS
Status: DISCONTINUED | OUTPATIENT
Start: 2018-09-19 | End: 2018-09-19 | Stop reason: HOSPADM

## 2018-09-19 RX ORDER — FENTANYL CITRATE 50 UG/ML
200 INJECTION, SOLUTION INTRAMUSCULAR; INTRAVENOUS
Status: DISCONTINUED | OUTPATIENT
Start: 2018-09-19 | End: 2018-09-19 | Stop reason: HOSPADM

## 2018-09-19 RX ORDER — LIDOCAINE HYDROCHLORIDE 20 MG/ML
INJECTION, SOLUTION EPIDURAL; INFILTRATION; INTRACAUDAL; PERINEURAL AS NEEDED
Status: DISCONTINUED | OUTPATIENT
Start: 2018-09-19 | End: 2018-09-19 | Stop reason: HOSPADM

## 2018-09-19 RX ORDER — SODIUM CHLORIDE 9 MG/ML
INJECTION, SOLUTION INTRAVENOUS
Status: DISCONTINUED | OUTPATIENT
Start: 2018-09-19 | End: 2018-09-19 | Stop reason: HOSPADM

## 2018-09-19 RX ORDER — LORAZEPAM 2 MG/ML
2 INJECTION INTRAMUSCULAR AS NEEDED
Status: DISCONTINUED | OUTPATIENT
Start: 2018-09-19 | End: 2018-09-19 | Stop reason: HOSPADM

## 2018-09-19 RX ORDER — SODIUM CHLORIDE 0.9 % (FLUSH) 0.9 %
5-10 SYRINGE (ML) INJECTION EVERY 8 HOURS
Status: DISCONTINUED | OUTPATIENT
Start: 2018-09-19 | End: 2018-09-19 | Stop reason: HOSPADM

## 2018-09-19 RX ORDER — SODIUM CHLORIDE 9 MG/ML
50 INJECTION, SOLUTION INTRAVENOUS CONTINUOUS
Status: DISCONTINUED | OUTPATIENT
Start: 2018-09-19 | End: 2018-09-19 | Stop reason: HOSPADM

## 2018-09-19 RX ADMIN — LIDOCAINE HYDROCHLORIDE 40 MG: 20 INJECTION, SOLUTION EPIDURAL; INFILTRATION; INTRACAUDAL; PERINEURAL at 10:55

## 2018-09-19 RX ADMIN — PROPOFOL 50 MG: 10 INJECTION, EMULSION INTRAVENOUS at 11:05

## 2018-09-19 RX ADMIN — PROPOFOL 50 MG: 10 INJECTION, EMULSION INTRAVENOUS at 11:00

## 2018-09-19 RX ADMIN — SODIUM CHLORIDE: 9 INJECTION, SOLUTION INTRAVENOUS at 10:50

## 2018-09-19 RX ADMIN — PROPOFOL 100 MG: 10 INJECTION, EMULSION INTRAVENOUS at 10:55

## 2018-09-19 NOTE — H&P
Pre-endoscopy H and P for Colonoscopy The patient was seen and examined. Date of last colonoscopy: 2017, Polyps  No   
 
The airway was assessed and documented. The problem list, past medical history, and medications were reviewed. Patient Active Problem List  
Diagnosis Code  Essential hypertension, benign I10  
 CAD (coronary artery disease) I25.10  DM (diabetes mellitus) type II controlled, neurological manifestation (HCC) E11.49  
 Pure hypercholesterolemia E78.00  Recurrent major depressive disorder ([facility] F33.9  Chronic back pain M54.9, G89.29  
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  
 Number of children: N/A  
 Years of education: N/A Occupational History  Not on file. Social History Main Topics  Smoking status: Former Smoker Quit date: 1/1/1990  Smokeless tobacco: Never Used  Alcohol use No  
   Comment: none  Drug use: No  
 Sexual activity: Not Currently Partners: Female Other Topics Concern  Not on file Social History Narrative Past Medical History:  
Diagnosis Date  BPH (benign prostatic hyperplasia)   
 improved after TURP  
 CAD (coronary artery disease) 2005  
 MI, PCI/stent to mid CX 6/1/02, CABG x2 on 9/6/02 (LIMA to LAD & SVG to CX)  Calculus of kidney   
 due to pelvic kidney  Chronic back pain   
 scoliosis & OA  
 Depression  Diabetes ([facility]  Hypertension  CARROL (obstructive sleep apnea) 6/10/2016  
 uses CPAP  
 Other and unspecified hyperlipidemia  UC (ulcerative colitis) ([facility] The patient has a family history of na Prior to Admission Medications Prescriptions Last Dose Informant Patient Reported? Taking?   
Blood-Glucose Meter (TRUE METRIX GLUCOSE METER) misc   No No  
 Sig: Use to test blood sugars daily. DX:  E11.49 Lancets & Blood Glucose Strips Cmpk   No No  
Sig: Element glucometer MULTIVITAMIN PO   Yes No  
Sig: Take 1 Tab by mouth daily. TRUE METRIX GLUCOSE TEST STRIP strip   No No  
Sig: TEST ONCE DAILY AS DIRECTED  
VIT B6/MAG CIT & OX/POTASS CIT (THERALITH XR PO)   Yes No  
Sig: Take 2 tablets by mouth two (2) times a day. aspirin 81 mg tablet   Yes No  
Sig: Take 81 mg by mouth nightly. Indications: myocardial infarction prevention  
cholecalciferol, vitamin D3, (VITAMIN D3) 2,000 unit tab   Yes No  
Sig: Take 2,000 Units by mouth daily. dicyclomine (BENTYL) 10 mg capsule   Yes No  
Sig: Take 10 mg by mouth three (3) times daily as needed. gabapentin (NEURONTIN) 100 mg capsule   No No  
Sig: TAKE 1 CAPSULE BY MOUTH THREE TIMES DAILY  
glucose blood VI test strips (TRUE METRIX GLUCOSE TEST STRIP) strip   No No  
Sig: Test blood sugars daily. DX: E11.49  
lisinopril (PRINIVIL, ZESTRIL) 10 mg tablet 2018 at Unknown time  No Yes Sig: Take 0.5 Tabs by mouth nightly. lisinopril (PRINIVIL, ZESTRIL) 5 mg tablet 2018 at Unknown time  No Yes Sig: Take 1 Tab by mouth daily. melatonin tab tablet   Yes No  
Sig: Take 5 mg by mouth nightly. mesalamine DR (ASACOL HD) 800 mg DR tablet   Yes No  
Sig: Take 1,600 mg by mouth three (3) times daily. metFORMIN ER (GLUCOPHAGE XR) 500 mg tablet   No No  
Si tab PO in AM and 2 tabs PO in PM  
metoprolol tartrate (LOPRESSOR) 25 mg tablet 2018 at Unknown time  No Yes Sig: TAKE 1/2 TABLET BY MOUTH TWICE DAILY  
sertraline (ZOLOFT) 50 mg tablet   No No  
Sig: TAKE 1 AND 1/2 TABLETS BY MOUTH DAILY  
simvastatin (ZOCOR) 40 mg tablet   No No  
Sig: TAKE 1 TABLET BY MOUTH NIGHTLY Facility-Administered Medications: None The review of systems is:  negative for shortness of breath or chest pain The heart, lungs and mental status were satisfactory for the administration of MAC sedation and for the procedure. Mallampati score: See Anesthesia. I discussed with the patient the objectives, risks, consequences and alternatives to the procedure. Plan: Endoscopic procedure with MAC sedation. Bhanu Matos MD 
9/19/2018 10:51 AM

## 2018-09-19 NOTE — DISCHARGE INSTRUCTIONS
Mina Pat  [id number]  1940    COLON DISCHARGE INSTRUCTIONS  Discomfort:  Redness at IV site- apply warm compress to area; if redness or soreness persist- contact your physician  There may be a slight amount of blood passed from the rectum  Gaseous discomfort- walking, belching will help relieve any discomfort  You may not operate a vehicle for 12 hours  You may not engage in an occupation involving machinery or appliances for rest of today  You may not drink alcoholic beverages for at least 12 hours  Avoid making any critical decisions for at least 24 hour  DIET:   Regular diet. - however -  remember your colon is empty and a heavy meal will produce gas. Avoid these foods:  vegetables, fried / greasy foods, carbonated drinks for today. ACTIVITY:  You may resume your normal daily activities it is recommended that you spend the remainder of the day resting -  avoid any strenuous activity. CALL M.D. ANY SIGN OF:  Increasing pain, nausea, vomiting  Abdominal distension (swelling)  New increased bleeding (oral or rectal)  Fever (chills)  Pain in chest area  Bloody discharge from nose or mouth  Shortness of breath    You may not  take any Advil, Aspirin, Ibuprofen, Motrin, Aleve, Goodys, or any similar pain or arthritis products unless MD recommended because it can worsen UC ONLY  Tylenol as needed for pain. Follow-up Instructions:   Call Dr. Willy Medina  Results of procedure / biopsy in 10-14days   Office telephone for problems or questions [phone]      - Await pathology.     - Sx's of UC and metformin use. Improved.  Continue current Rx with mesalamine and changed dose of metformin      - Repeat colonoscopy in 2 years

## 2018-09-19 NOTE — PROCEDURES
Colonoscopy Procedure Note    Indications:   Ulcerative colitis x 38 years, occassional use of prednisone for flares none x 1 year, c/o multiple loose stools now one-to formed stools since starting mesalamine 2 tabs TID and change of metformin  Referring Physician: Kiran Betancourt MD   Anesthesia/Sedation:MAC  Endoscopist:  Dr. Calli Orozco  Assistant:  Endoscopy Technician-1: Osiel Beauchamp IV  Endoscopy RN-1: Kaylee Montanez    Preoperative diagnosis: ULCERATIVE COLITIS  DIARRHEA     Postoperative diagnosis:Mild patchy UC  Bx's - 1. Descending Colon Polyp  2. Ascending Colon   3. Transverse Colon   4. Sigmoid colon       Procedure in Detail:  Informed consent was obtained for the procedure, including sedation. Risks of perforation, hemorrhage, adverse drug reaction, and aspiration were discussed. The patient was placed in the left lateral decubitus position. Based on the pre-procedure assessment, including review of the patient's medical history, medications, allergies, and review of systems, he had been deemed to be an appropriate candidate for moderate sedation; he was therefore sedated with the medications listed above. The patient was monitored continuously with ECG tracing, pulse oximetry, blood pressure monitoring, and direct observations. A rectal examination was performed. The [id number] was inserted into the rectum and advanced under direct vision to the cecum, which was identified by the ileocecal valve and appendiceal orifice. The quality of the colonic preparation was good. A careful inspection was made as the colonoscope was withdrawn, including a retroflexed view of the rectum; findings and interventions are described below.       Findings: no edema, no exudate appearance mildly improved compared to last year  Rectum: sparing with normal vasculature  Sigmoid: patchy erythema and loss of vascular appearance - Bx  Descending Colon:patchy erythema and loss of vascular appearance - Bx  Transverse Colon:patchy erythema and loss of vascular appearance -Bx  Ascending Colon:patchy erythema and loss of vascular appearance - Bx  Cecum:patchy erythema and loss of vascular appearance  Terminal Ileum: not intubated    Specimens:     see above    EBL: None    Complications: None; patient tolerated the procedure well. Recommendations:     - Await pathology.     - Sx's of UC and metformin use. Improved.  Continue current Rx with mesalamine and changed dose of metformin      - Repeat colonoscopy in 2 years    Signed By: George Garcia MD                        September 19, 2018

## 2018-09-19 NOTE — ROUTINE PROCESS
Alta Soria 1940 
[id number] Situation: 
Verbal report received from: [facility] Procedure: Procedure(s): 
COLONOSCOPY 
ENDOSCOPIC POLYPECTOMY Background: 
 
Preoperative diagnosis: ALTERATIVE COLITIS DIARRHEA Postoperative diagnosis: 1. Descending Colon Polyp 2. Ascending Colon Polyp 3. Transverse Colon Polyp 4. Mild Patchy Colitis 5. Sigmoid colon Polyp :  Dr. Becca Trotter Assistant(s): Endoscopy Technician-1: Isra Henry IV Endoscopy RN-1: Eulene Siemens Specimens:  
ID Type Source Tests Collected by Time Destination 1 : descending colon polyp Preservative Colon, Descending  Kika Lawler MD 9/19/2018 1101 Pathology 2 : ascending colon polyp Preservative Colon, Ascending  Kika Lawler MD 9/19/2018 2713 Pathology 3 : transverse colon polyp Preservative Colon, Transverse  Kika Lawler MD 9/19/2018 1109 Pathology 4 : sigmoid colon polyp Preservative Sigmoid  Kika Lawler MD 9/19/2018 1109 Pathology H. Pylori  no Assessment: 
Intra-procedure medications Anesthesia gave intra-procedure sedation and medications, see anesthesia flow sheet yes Intravenous fluids: NS@ Cait Grosselicia Vital signs stable Abdominal assessment: round and soft Recommendation: 
Discharge patient per MD order. Family or Friend Permission to share finding with family or friend yes

## 2018-09-19 NOTE — IP AVS SNAPSHOT
[address] [place] [address] 
[phone] Patient: Cindi Tellez MRN: [id number] About your hospitalization You were admitted on:  September 19, 2018 You last received care in the:  [facility] ENDOSCOPY You were discharged on:  September 19, 2018 Why you were hospitalized Your primary diagnosis was:  Not on File Follow-up Information None Discharge Orders None A check kerline indicates which time of day the medication should be taken. My Medications CONTINUE taking these medications Instructions Each Dose to Equal  
 Morning Noon Evening Bedtime  
 aspirin 81 mg tablet Your last dose was: Your next dose is: Take 81 mg by mouth nightly. Indications: myocardial infarction prevention 81 mg Blood-Glucose Meter Misc Commonly known as:  TRUE METRIX GLUCOSE METER Your last dose was: Your next dose is:    
   
   
 Use to test blood sugars daily. DX:  E11.49  
     
   
   
   
  
 dicyclomine 10 mg capsule Commonly known as:  BENTYL Your last dose was: Your next dose is: Take 10 mg by mouth three (3) times daily as needed. 10 mg  
    
   
   
   
  
 gabapentin 100 mg capsule Commonly known as:  NEURONTIN Your last dose was: Your next dose is: TAKE 1 CAPSULE BY MOUTH THREE TIMES DAILY * glucose blood VI test strips strip Commonly known as:  TRUE METRIX GLUCOSE TEST STRIP Your last dose was: Your next dose is:    
   
   
 Test blood sugars daily. DX: E11.49  
     
   
   
   
  
 * TRUE METRIX GLUCOSE TEST STRIP strip Generic drug:  glucose blood VI test strips Your last dose was: Your next dose is:    
   
   
 TEST ONCE DAILY AS DIRECTED Lancets & Blood Glucose Strips [facility] Your last dose was: Your next dose is:    
   
   
 Element glucometer * lisinopril 10 mg tablet Commonly known as:  Donnald Code Your last dose was: Your next dose is: Take 0.5 Tabs by mouth nightly. 5 mg * lisinopril 5 mg tablet Commonly known as:  Donnald Code Your last dose was: Your next dose is: Take 1 Tab by mouth daily. 5 mg  
    
   
   
   
  
 melatonin Tab tablet Your last dose was: Your next dose is: Take 5 mg by mouth nightly. 5 mg  
    
   
   
   
  
 mesalamine  mg DR tablet Commonly known as:  ASACOL HD Your last dose was: Your next dose is: Take 1,600 mg by mouth three (3) times daily. 1600 mg  
    
   
   
   
  
 metFORMIN  mg tablet Commonly known as:  GLUCOPHAGE XR Your last dose was: Your next dose is:    
   
   
 1 tab PO in AM and 2 tabs PO in PM  
     
   
   
   
  
 metoprolol tartrate 25 mg tablet Commonly known as:  LOPRESSOR Your last dose was: Your next dose is: TAKE 1/2 TABLET BY MOUTH TWICE DAILY MULTIVITAMIN PO Your last dose was: Your next dose is: Take 1 Tab by mouth daily. 1 Tab  
    
   
   
   
  
 sertraline 50 mg tablet Commonly known as:  ZOLOFT Your last dose was: Your next dose is: TAKE 1 AND 1/2 TABLETS BY MOUTH DAILY  
     
   
   
   
  
 simvastatin 40 mg tablet Commonly known as:  ZOCOR Your last dose was: Your next dose is: TAKE 1 TABLET BY MOUTH NIGHTLY THERALITH XR PO Your last dose was: Your next dose is: Take 2 tablets by mouth two (2) times a day. 2 Tab  
    
   
   
   
  
 VITAMIN D3 2,000 unit Tab Generic drug:  cholecalciferol (vitamin D3) Your last dose was: Your next dose is: Take 2,000 Units by mouth daily. 2000 Units * Notice: This list has 4 medication(s) that are the same as other medications prescribed for you. Read the directions carefully, and ask your doctor or other care provider to review them with you. Discharge Instructions Araceli Arzate [id number] 
1940 COLON DISCHARGE INSTRUCTIONS Discomfort: 
Redness at IV site- apply warm compress to area; if redness or soreness persist- contact your physician There may be a slight amount of blood passed from the rectum Gaseous discomfort- walking, belching will help relieve any discomfort You may not operate a vehicle for 12 hours You may not engage in an occupation involving machinery or appliances for rest of today You may not drink alcoholic beverages for at least 12 hours Avoid making any critical decisions for at least 24 hour DIET: 
 Regular diet.  however -  remember your colon is empty and a heavy meal will produce gas. Avoid these foods:  vegetables, fried / greasy foods, carbonated drinks for today. ACTIVITY: 
You may resume your normal daily activities it is recommended that you spend the remainder of the day resting -  avoid any strenuous activity. CALL M.D. ANY SIGN OF: Increasing pain, nausea, vomiting Abdominal distension (swelling) New increased bleeding (oral or rectal) Fever (chills) Pain in chest area Bloody discharge from nose or mouth Shortness of breath You may not  take any Advil, Aspirin, Ibuprofen, Motrin, Aleve, Goodys, or any similar pain or arthritis products unless MD recommended because it can worsen UC ONLY  Tylenol as needed for pain. Follow-up Instructions: 
 Call Dr. Wallace Weber Results of procedure / biopsy in 10-14days Office telephone for problems or questions [phone] - Await pathology. - Sx's of UC and metformin use. Improved. Continue current Rx with mesalamine and changed dose of metformin - Repeat colonoscopy in 2 years ACO Transitions of Care Introducing [employer] [address] offers a voluntary care coordination program to provide high quality service and care to [facility] fee-for-service beneficiaries. Jenae Greene was designed to help you enhance your health and well-being through the following services: ? Transitions of Care  support for individuals who are transitioning from one care setting to another (example: Hospital to home). ? Chronic and Complex Care Coordination  support for individuals and caregivers of those with serious or chronic illnesses or with more than one chronic (ongoing) condition and those who take a number of different medications. If you meet specific medical criteria, a [address] [place] [address] Rd may call you directly to coordinate your care with your primary care physician and your other care providers. For questions about the [facility] programs, please, contact your physicians office. For general questions or additional information about Accountable Care Organizations: 
Please visit www.medicare.gov/acos. html or call 1-800-MEDICARE (5-793.733.1790) TTY users should call 6-291.925.6566. Introducing [facility]! Nam Hernandez introduces [employer] patient portal. Now you can access parts of your medical record, email your doctor's office, and request medication refills online. 1. In your internet browser, go to [email or web address] 2. Click on the First Time User? Click Here link in the Sign In box. You will see the New Member Sign Up page. 3. Enter your [employer] Access Code exactly as it appears below. You will not need to use this code after youve completed the sign-up process.  If you do not sign up before the expiration date, you must request a new code. · [employer] Access Code: [email or web address] Expires: 11/18/2018  7:53 AM 
 
4. Enter the last four digits of your Social Security Number (xxxx) and Date of Birth (mm/dd/yyyy) as indicated and click Submit. You will be taken to the next sign-up page. 5. Create a [employer] ID. This will be your [employer] login ID and cannot be changed, so think of one that is secure and easy to remember. 6. Create a [employer] password. You can change your password at any time. 7. Enter your Password Reset Question and Answer. This can be used at a later time if you forget your password. 8. Enter your e-mail address. You will receive e-mail notification when new information is available in [address] 9. Click Sign Up. You can now view and download portions of your medical record. 10. Click the Download Summary menu link to download a portable copy of your medical information. If you have questions, please visit the Frequently Asked Questions section of the DH[employer] website. Remember, [employer] is NOT to be used for urgent needs. For medical emergencies, dial 911. Now available from your iPhone and Android! Introducing Sukhi Ruffin As a [facility] patient, I wanted to make you aware of our electronic visit tool called Sukhi Jamescassie. [facility] 24/7 allows you to connect within minutes with a medical provider 24 hours a day, seven days a week via a mobile device or tablet or logging into a secure website from your computer. You can access Sukhi Ruffin from anywhere in the [place].  
 
A virtual visit might be right for you when you have a simple condition and feel like you just dont want to get out of bed, or cant get away from work for an appointment, when your regular [facility] provider is not available (evenings, weekends or holidays), or when youre out of town and need minor care. Electronic visits cost only $49 and if the [employer] 24/7 provider determines a prescription is needed to treat your condition, one can be electronically transmitted to a nearby pharmacy*. Please take a moment to enroll today if you have not already done so. The enrollment process is free and takes just a few minutes. To enroll, please download the [employer] 24/7 gumaro to your tablet or phone, or visit [email or web address] to enroll on your computer. And, as an [address] [place] [address] patient with a [employer] account, the results of your visits will be scanned into your electronic medical record and your primary care provider will be able to view the scanned results. We urge you to continue to see your regular [employer] provider for your ongoing medical care. And while your primary care provider may not be the one available when you seek a [employer] virtual visit, the peace of mind you get from getting a real diagnosis real time can be priceless. For more information on [employer], view our Frequently Asked Questions (FAQs) at [email or web address]. Sincerely, 
 
Anali Paiz MD 
Chief Medical Officer [address] *:  certain medications cannot be prescribed via [employer] Providers Seen During Your Hospitalization Provider Specialty Primary office phone Celina Chen MD Gastroenterology [phone] Your Primary Care Physician (PCP) Primary Care Physician Office Phone Office Fax Gerber Hernández [phone] You are allergic to the following Allergen Reactions Apriso (Mesalamine) Diarrhea Prednisone Other (comments)  
 constipation Sulfasalazine Hives Recent Documentation Smoking Status Former Smoker Emergency Contacts Name Discharge Info Relation Home Work Mobile Sweetie Gong  Spouse [3] [phone] Patient Belongings The following personal items are in your possession at time of discharge: 
  Dental Appliances: None  Visual Aid: Glasses, Other (comment) (with wife)   Hearing Aids/Status: Bilateral, Other (comment) (with wife) Please provide this summary of care documentation to your next provider. Signatures-by signing, you are acknowledging that this After Visit Summary has been reviewed with you and you have received a copy. Patient Signature:  ____________________________________________________________ Date:  ____________________________________________________________  
  
[facility] Provider Signature:  ____________________________________________________________ Date:  ____________________________________________________________

## 2018-09-20 NOTE — ANESTHESIA POSTPROCEDURE EVALUATION
Post-Anesthesia Evaluation and Assessment Patient: Rojas Huntley MRN: [id number]  SSN: [id number] YOB: 1940  Age: 69 y.o. Sex: male Cardiovascular Function/Vital Signs Visit Vitals  /63  Pulse 70  Temp 36.3 °C (97.4 °F)  Resp 10  SpO2 98% Patient is status post MAC anesthesia for Procedure(s): 
COLONOSCOPY 
ENDOSCOPIC POLYPECTOMY. Nausea/Vomiting: None Postoperative hydration reviewed and adequate. Pain: 
Pain Scale 1: Numeric (0 - 10) (09/19/18 1135) Pain Intensity 1: 0 (09/19/18 1135) Managed Neurological Status: At baseline Mental Status and Level of Consciousness: Arousable Pulmonary Status:  
O2 Device: Room air (09/19/18 1135) Adequate oxygenation and airway patent Complications related to anesthesia: None Post-anesthesia assessment completed. No concerns Signed By: Faisal Pulido MD   
 September 20, 2018

## 2018-10-02 DIAGNOSIS — E11.42 DIABETIC PERIPHERAL NEUROPATHY (HCC): ICD-10-CM

## 2018-10-02 RX ORDER — SIMVASTATIN 40 MG/1
TABLET, FILM COATED ORAL
Qty: 90 TAB | Refills: 1 | Status: SHIPPED | OUTPATIENT
Start: 2018-10-02 | End: 2019-03-14 | Stop reason: SDUPTHER

## 2018-10-02 RX ORDER — GABAPENTIN 100 MG/1
CAPSULE ORAL
Qty: 270 CAP | Refills: 1 | Status: SHIPPED | OUTPATIENT
Start: 2018-10-02 | End: 2019-03-14 | Stop reason: SDUPTHER

## 2018-10-04 DIAGNOSIS — F33.1 MODERATE EPISODE OF RECURRENT MAJOR DEPRESSIVE DISORDER (HCC): ICD-10-CM

## 2018-10-04 RX ORDER — SERTRALINE HYDROCHLORIDE 50 MG/1
TABLET, FILM COATED ORAL
Qty: 135 TAB | Refills: 1 | Status: SHIPPED | OUTPATIENT
Start: 2018-10-04 | End: 2019-03-14 | Stop reason: SDUPTHER

## 2018-10-30 DIAGNOSIS — I25.10 CORONARY ARTERY DISEASE INVOLVING NATIVE CORONARY ARTERY WITHOUT ANGINA PECTORIS, UNSPECIFIED WHETHER NATIVE OR TRANSPLANTED HEART: ICD-10-CM

## 2018-10-30 RX ORDER — METOPROLOL TARTRATE 25 MG/1
TABLET, FILM COATED ORAL
Qty: 90 TAB | Refills: 1 | Status: SHIPPED | OUTPATIENT
Start: 2018-10-30 | End: 2019-04-18 | Stop reason: SDUPTHER

## 2018-10-30 RX ORDER — METFORMIN HYDROCHLORIDE 500 MG/1
TABLET, EXTENDED RELEASE ORAL
Qty: 270 TAB | Refills: 1 | Status: SHIPPED | OUTPATIENT
Start: 2018-10-30 | End: 2019-04-18 | Stop reason: SDUPTHER

## 2019-02-19 RX ORDER — LISINOPRIL 5 MG/1
5 TABLET ORAL DAILY
Qty: 90 TAB | Refills: 0 | Status: SHIPPED | OUTPATIENT
Start: 2019-02-19 | End: 2019-06-05 | Stop reason: SDUPTHER

## 2019-02-20 NOTE — TELEPHONE ENCOUNTER
Verified patient identity with two identifiers. Spoke with patient by phone. Has appt: Wednesday, March 13, 2019 09:30 AM    Can see this in [place] but not in  for some reason   Will get this fixed.

## 2019-03-13 ENCOUNTER — HOSPITAL ENCOUNTER (OUTPATIENT)
Dept: LAB | Age: 79
Discharge: HOME | End: 2019-03-13
Payer: MEDICARE

## 2019-03-13 ENCOUNTER — TELEPHONE (OUTPATIENT)
Dept: INTERNAL MEDICINE CLINIC | Age: 79
End: 2019-03-13

## 2019-03-13 ENCOUNTER — OFFICE VISIT (OUTPATIENT)
Dept: INTERNAL MEDICINE CLINIC | Age: 79
End: 2019-03-13

## 2019-03-13 VITALS
HEART RATE: 68 BPM | WEIGHT: 215 LBS | DIASTOLIC BLOOD PRESSURE: 68 MMHG | RESPIRATION RATE: 16 BRPM | OXYGEN SATURATION: 97 % | SYSTOLIC BLOOD PRESSURE: 116 MMHG | HEIGHT: 72 IN | BODY MASS INDEX: 29.12 KG/M2 | TEMPERATURE: 97.5 F

## 2019-03-13 DIAGNOSIS — E78.00 PURE HYPERCHOLESTEROLEMIA: ICD-10-CM

## 2019-03-13 DIAGNOSIS — F33.41 RECURRENT MAJOR DEPRESSIVE DISORDER, IN PARTIAL REMISSION (HCC): ICD-10-CM

## 2019-03-13 DIAGNOSIS — Z23 ENCOUNTER FOR IMMUNIZATION: ICD-10-CM

## 2019-03-13 DIAGNOSIS — I25.10 CORONARY ARTERY DISEASE INVOLVING NATIVE CORONARY ARTERY OF NATIVE HEART WITHOUT ANGINA PECTORIS: ICD-10-CM

## 2019-03-13 DIAGNOSIS — R41.3 MEMORY CHANGES: ICD-10-CM

## 2019-03-13 DIAGNOSIS — Z00.00 MEDICARE ANNUAL WELLNESS VISIT, SUBSEQUENT: Primary | ICD-10-CM

## 2019-03-13 DIAGNOSIS — I10 ESSENTIAL HYPERTENSION, BENIGN: ICD-10-CM

## 2019-03-13 DIAGNOSIS — Z71.89 ACP (ADVANCE CARE PLANNING): ICD-10-CM

## 2019-03-13 DIAGNOSIS — K51.911 ULCERATIVE COLITIS WITH RECTAL BLEEDING, UNSPECIFIED LOCATION (HCC): ICD-10-CM

## 2019-03-13 DIAGNOSIS — E11.49 CONTROLLED TYPE 2 DIABETES MELLITUS WITH OTHER NEUROLOGIC COMPLICATION, WITHOUT LONG-TERM CURRENT USE OF INSULIN (HCC): ICD-10-CM

## 2019-03-13 DIAGNOSIS — Z13.39 SCREENING FOR ALCOHOLISM: ICD-10-CM

## 2019-03-13 PROCEDURE — 80061 LIPID PANEL: CPT

## 2019-03-13 PROCEDURE — 36415 COLL VENOUS BLD VENIPUNCTURE: CPT

## 2019-03-13 PROCEDURE — 80053 COMPREHEN METABOLIC PANEL: CPT

## 2019-03-13 PROCEDURE — 83036 HEMOGLOBIN GLYCOSYLATED A1C: CPT

## 2019-03-13 PROCEDURE — 85027 COMPLETE CBC AUTOMATED: CPT

## 2019-03-13 PROCEDURE — 82043 UR ALBUMIN QUANTITATIVE: CPT

## 2019-03-13 RX ORDER — BLOOD-GLUCOSE METER
EACH MISCELLANEOUS
Qty: 1 EACH | Refills: 0 | Status: SHIPPED | OUTPATIENT
Start: 2019-03-13 | End: 2022-09-16 | Stop reason: ALTCHOICE

## 2019-03-13 NOTE — ACP (ADVANCE CARE PLANNING)
Advance Care Planning (ACP) Provider Note - Comprehensive     Date of ACP Conversation: 03/13/19  Persons included in Conversation:  patient  Length of ACP Conversation in minutes: <16 minutes (Non-Billable)    Authorized Decision Maker (if patient is incapable of making informed decisions):   Healthcare Agent/Medical Power of  under Advance Directive      He has an advanced directive - a copy has been provided & still reflects him wishes. Reviewed DNR/DNI and patient is not interested. General ACP for ALL Patients with Decision Making Capacity:  Importance of advance care planning, including choosing a healthcare agent to communicate patient's healthcare decisions if patient lost the ability to make decisions, such as after a sudden illness or accident  Understanding of the healthcare agent role was assessed and information provided  Opportunity offered to explore how cultural, Judaism, spiritual, or personal beliefs would affect decisions for future care  Exploration of values, goals, and preferences if recovery is not expected, even with continued medical treatment in the event of: Imminent death or severe, permanent brain injury    For Serious or Chronic Illness:  Understanding of CPR, goals and expected outcomes, benefits and burdens discussed.   Understanding of medical condition  Information on CPR success rates provided (e.g. for CPR in hospital, survival to d/c at two weeks is 22%, for chronically ill or elderly/frail survival is less than 3%)    Interventions Provided:  Recommended communicating the plan and making copies for the healthcare agent, personal physician, and others as appropriate (e.g., health system)  Recommended review of completed ACP document annually or upon change in health status

## 2019-03-13 NOTE — PATIENT INSTRUCTIONS
Lets plan on decreasing metformin to 1 tab a day (either morning or evening) after we check your labs. Lets reassess you are feeling from a GI standpoint. Dementia: Care Instructions  Your Care Instructions    Dementia is a loss of mental skills that affects your daily life. It is different than the occasional trouble with memory that is part of aging. You may find it hard to remember things that you feel you should be able to remember. Or you may feel that your mind is just not working as well as usual.  Finding out that you have dementia is a shock. You may be afraid and worried about how the condition will change your life. Although there is no cure at this time, medicine may slow memory loss and improve thinking for a while. Other medicines may be able to help you sleep or cope with depression and behavior changes. Dementia often gets worse slowly. But it can get worse quickly. As dementia gets worse, it may become harder to do common things that take planning, like making a list and going shopping. Over time, the disease may make it hard for you to take care of yourself. Some people with dementia need others to help care for them. Dementia is different for everyone. You may be able to function well for a long time. In the early stage of the condition, you can do things at home to make life easier and safer. You also can keep doing your hobbies and other activities. Many people find comfort in planning now for their future needs. Follow-up care is a key part of your treatment and safety. Be sure to make and go to all appointments, and call your doctor if you are having problems. It's also a good idea to know your test results and keep a list of the medicines you take. How can you care for yourself at home? · Take your medicines exactly as prescribed. Call your doctor if you think you are having a problem with your medicine. · Eat healthy foods.  Eat lots of whole grains, fruits, and vegetables every day. If you are not hungry, try snacks or nutritional drinks such as Boost, Ensure, or Sustacal.  · If you have problems sleeping:  ? Try not to nap too close to your bedtime. ? Exercise regularly. Walking is a good choice. ? Try a glass of warm milk or caffeine-free herbal tea before bed. · Do tasks and activities during the time of day when you feel your best. It may help to develop a daily routine. · Post labels, lists, and sticky notes to help you remember things. Write your activities on a calendar you can easily find. Put your clock where you can easily see it. · Stay active. Take walks in familiar places, or with friends or loved ones. Try to stay active mentally too. Read and work crossword puzzles if you enjoy these activities. · Do not drive unless you can pass an on-road driving test. If you are not sure if you are safe to drive, your state 's license bureau can test you. · Keep a cordless phone and a flashlight with new batteries by your bed. If possible, put a phone in each of the main rooms of your house, or carry a cell phone in case you fall and cannot reach a phone. Or, you can wear a device around your neck or wrist. You push a button that sends a signal for help. Acknowledge your emotions and plan for the future  · Talk openly and honestly with your doctor. · Let yourself grieve. It is common to feel angry, scared, frustrated, anxious, or depressed. · Get emotional support from family, friends, a support group, or a counselor experienced in working with people who have dementia. · Ask for help if you need it. · Plan for the future. ? Talk to your family and doctor about preparing a living will and other important papers while you can make decisions. These papers tell your doctors how to care for you at the end of your life. ? Consider naming a person to make decisions about your care if you are not able to. When should you call for help?   Call 911 anytime you think you may need emergency care. For example, call if:    · You are lost and do not know whom to call.     · You are injured and do not know whom to call.    Call your doctor now or seek immediate medical care if:    · You are more confused or upset than usual.     · You feel like you could hurt yourself because your mind is not working well.   Roxi Candida closely for changes in your health, and be sure to contact your doctor if you have any problems. Where can you learn more? Go to http://jesús-jhoana.info/. Enter E562 in the search box to learn more about \"Dementia: Care Instructions. \"  Current as of: September 11, 2018  Content Version: 11.9  © 8587-1852 [employer]. Care instructions adapted under license by [employer] (which disclaims liability or warranty for this information). If you have questions about a medical condition or this instruction, always ask your healthcare professional. [place] [address] any warranty or liability for your use of this information. Medicare Wellness Visit, Male    The best way to live healthy is to have a lifestyle where you eat a well-balanced diet, exercise regularly, limit alcohol use, and quit all forms of tobacco/nicotine, if applicable. Regular preventive services are another way to keep healthy. Preventive services (vaccines, screening tests, monitoring & exams) can help personalize your care plan, which helps you manage your own care. Screening tests can find health problems at the earliest stages, when they are easiest to treat. [facility] follows the current, evidence-based guidelines published by the [facility] [facility] (USPSTF) when recommending preventive services for our patients. Because we follow these guidelines, sometimes recommendations change over time as research supports it.  (For example, a prostate screening blood test is no longer routinely recommended for men with no symptoms.)  Of course, you and your doctor may decide to screen more often for some diseases, based on your risk and co-morbidities (chronic disease you are already diagnosed with). Preventive services for you include:  - Medicare offers their members a free annual wellness visit, which is time for you and your primary care provider to discuss and plan for your preventive service needs. Take advantage of this benefit every year!  -All adults over age 72 should receive the recommended pneumonia vaccines. Current USPSTF guidelines recommend a series of two vaccines for the best pneumonia protection.   -All adults should have a flu vaccine yearly and an ECG. All adults age 61 and older should receive a shingles vaccine once in their lifetime.    -All adults age 38-68 who are overweight should have a diabetes screening test once every three years.   -Other screening tests & preventive services for persons with diabetes include: an eye exam to screen for diabetic retinopathy, a kidney function test, a foot exam, and stricter control over your cholesterol.   -Cardiovascular screening for adults with routine risk involves an electrocardiogram (ECG) at intervals determined by the provider.   -Colorectal cancer screening should be done for adults age 54-65 with no increased risk factors for colorectal cancer. There are a number of acceptable methods of screening for this type of cancer. Each test has its own benefits and drawbacks. Discuss with your provider what is most appropriate for you during your annual wellness visit. The different tests include: colonoscopy (considered the best screening method), a fecal occult blood test, a fecal DNA test, and sigmoidoscopy.  -All adults born between [facility] should be screened once for Hepatitis C.  -An Abdominal Aortic Aneurysm (AAA) Screening is recommended for men age 73-68 who has ever smoked in their lifetime.      Here is a list of your current Health Maintenance items (your personalized list of preventive services) with a due date:  Health Maintenance Due   Topic Date Due    Albumin Urine Test  02/16/2019    Cholesterol Test   02/16/2019    Hemoglobin A1C    02/20/2019    Annual Well Visit  02/21/2019          Learning About Living [place]  What is a living will? A living will is a legal form you use to write down the kind of care you want at the end of your life. It is used by the health professionals who will treat you if you aren't able to decide for yourself. If you put your wishes in writing, your loved ones and others will know what kind of care you want. They won't need to guess. This can ease your mind and be helpful to others. A living will is not the same as an estate or property will. An estate will explains what you want to happen with your money and property after you die. Is a living will a legal document? A living will is a legal document. Each state has its own laws about living rees. If you move to another state, make sure that your living will is legal in the state where you now live. Or you might use a universal form that has been approved by many states. This kind of form can sometimes be completed and stored online. Your electronic copy will then be available wherever you have a connection to the Internet. In most cases, doctors will respect your wishes even if you have a form from a different state. · You don't need an  to complete a living will. But legal advice can be helpful if your state's laws are unclear, your health history is complicated, or your family can't agree on what should be in your living will. · You can change your living will at any time. Some people find that their wishes about end-of-life care change as their health changes. · In addition to making a living will, think about completing a medical power of  form.  This form lets you name the person you want to make end-of-life treatment decisions for you (your \"health care agent\") if you're not able to. Many hospitals and nursing homes will give you the forms you need to complete a living will and a medical power of . · Your living will is used only if you can't make or communicate decisions for yourself anymore. If you become able to make decisions again, you can accept or refuse any treatment, no matter what you wrote in your living will. · Your state may offer an online registry. This is a place where you can store your living will online so the doctors and nurses who need to treat you can find it right away. What should you think about when creating a living will? Talk about your end-of-life wishes with your family members and your doctor. Let them know what you want. That way the people making decisions for you won't be surprised by your choices. Think about these questions as you make your living will:  · Do you know enough about life support methods that might be used? If not, talk to your doctor so you know what might be done if you can't breathe on your own, your heart stops, or you're unable to swallow. · What things would you still want to be able to do after you receive life-support methods? Would you want to be able to walk? To speak? To eat on your own? To live without the help of machines? · If you have a choice, where do you want to be cared for? In your home? At a hospital or nursing home? · Do you want certain Bahai practices performed if you become very ill? · If you have a choice at the end of your life, where would you prefer to die? At home? In a hospital or nursing home? Somewhere else? · Would you prefer to be buried or cremated? · Do you want your organs to be donated after you die? What should you do with your living will? · Make sure that your family members and your health care agent have copies of your living will. · Give your doctor a copy of your living will to keep in your medical record.  If you have more than one doctor, make sure that each one has a copy. · You may want to put a copy of your living will where it can be easily found. Where can you learn more? Go to http://jesús-jhoana.info/. Enter I617 in the search box to learn more about \"Learning About Living [place]. \"  Current as of: August 8, 2016  Content Version: 11.3  © 8336-5066 [employer]. Care instructions adapted under license by [employer] (which disclaims liability or warranty for this information). If you have questions about a medical condition or this instruction, always ask your healthcare professional. [address] any warranty or liability for your use of this information.

## 2019-03-13 NOTE — TELEPHONE ENCOUNTER
Pharm received script for Gloucometer and test strips   Did not receive lancet order. Please send script to pharm.   Gabriela Jeffrey - [phone]

## 2019-03-13 NOTE — PROGRESS NOTES
Brent Hurtado is a 69 y.o. male who was seen in clinic today (3/13/2019) for a full physical.        Assessment & Plan:   Diagnoses and all orders for this visit:    1. Medicare annual wellness visit, subsequent    2. ACP (advance care planning)  -     FULL CODE    3. Encounter for immunization  -     varicella-zoster recombinant, PF, (SHINGRIX, PF,) 50 mcg/0.5 mL susr injection; 0.5mL by IntraMUSCular route once now and then repeat in 2-6 months    4. Screening for alcoholism  -     [place] ANNUAL ALCOHOL SCREEN 15 MIN    5. 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 He is worried his meter is not working well so will get him a new one.     -     glucose blood VI test strips (TRUE METRIX GLUCOSE TEST STRIP) strip; TEST ONCE DAILY AS DIRECTED  -     Blood-Glucose Meter (TRUE METRIX GLUCOSE METER) misc; Use to test blood sugars daily. DX:  O56.05  -     METABOLIC PANEL, COMPREHENSIVE  -     CBC W/O DIFF  -     HEMOGLOBIN A1C WITH EAG  -     LIPID PANEL  -     MICROALBUMIN, UR, RAND W/ MICROALB/CREAT RATIO  -      DIABETES FOOT EXAM    6. Coronary artery disease involving native coronary artery of native heart without angina pectoris- asymptomatic. BP is well controlled, lipids are well controlled. Continue: current plan pending review of labs but did review decreasing meds from 3 tab/day to 1 tab/day to see if this is related to diarrhea. -     METABOLIC PANEL, COMPREHENSIVE  -     CBC W/O DIFF  -     HEMOGLOBIN A1C WITH EAG    7. Essential hypertension, benign- at goal, continue current treatment pending review of labs   -     METABOLIC PANEL, COMPREHENSIVE  -     CBC W/O DIFF    8.  Pure hypercholesterolemia- well controlled, continue current treatment pending review of labs   -     METABOLIC PANEL, COMPREHENSIVE  -     HEMOGLOBIN A1C WITH EAG    9. Recurrent major depressive disorder, in partial remission ([facility] [address]- well controlled, continue current treatment, reviewed expectations w/ medications and helping with how he deals with stress/mood but will not change his personality, lots of stress around him and grandson butting heads. They will notify me if they want to try changing around meds. 10. Ulcerative colitis with rectal bleeding, unspecified location [facility])- well controlled, having some on/off issues, doubt related to MTF so will try decreasing dose from 3 tabs/day to 1 tab/day. 11. Memory changes- this is a new problem to me but sounds chronic, differential dx reviewed with the patient, favor [facility]. MMSE is abnormal at 26/30. Will get him set up for official testing prior to starting medications. See AVS.   -     REFERRAL TO PSYCHOLOGY       Weight is stable, I have reviewed/discussed the above normal BMI with the patient. I have recommended the following interventions: encourage exercise and lifestyle education regarding diet. Follow-up Disposition:  Return in about 6 months (around 9/13/2019), or if symptoms worsen or fail to improve, for Regular follow up.        ------------------------------------------------------------------------------------------    Subjective: Annual Wellness Visit- Subsequent Visit    End of Life Planning: This was discussed with him today and he has an advanced directive - a copy has been provided. Reviewed DNR/DNI and patient is not interested.       Depression Screen:   3 most recent PHQ Screens 3/13/2019   PHQ Not Done -   Little interest or pleasure in doing things Not at all   Feeling down, depressed, irritable, or hopeless Not at all   Total Score PHQ 2 0   Trouble falling or staying asleep, or sleeping too much Several days   Feeling tired or having little energy Not at all   Poor appetite, weight loss, or overeating Not at all   Feeling bad about yourself - or that you are a failure or have let yourself or your family down Not at all   Trouble concentrating on things such as school, work, reading, or watching TV Nearly every day   Moving or speaking so slowly that other people could have noticed; or the opposite being so fidgety that others notice Not at all   Thoughts of being better off dead, or hurting yourself in some way Not at all   PHQ 9 Score 4   How difficult have these problems made it for you to do your work, take care of your home and get along with others Not difficult at all       Fall Risk:   Fall Risk Assessment, last 12 mths 3/13/2019   Able to walk? Yes   Fall in past 12 months? No       Abuse Screen:  Abuse Screening Questionnaire 3/13/2019   Do you ever feel afraid of your partner? N   Are you in a relationship with someone who physically or mentally threatens you? N   Is it safe for you to go home? Y         Alcohol Risk Factor Screening: On any occasion during the past 3 months, have you had more than 4 drinks containing alcohol? No  Do you average more than 14 drinks per week? No    Hearing Loss: Hearing is good. Cognition Screen:  Has your family/caregiver stated any concerns about your memory: yes  Cognition: family and patient have mentioned some concerns, pt is getting frustrated when family calls him out on mistakes    Activities of Daily Living:    Requires assistance with: no ADLs  Home contains: handrails and grab bars  Exercise: not active    Adult Nutrition Screen:  No risk factors noted. Health Maintenance  Daily Aspirin: yes  AAA Screening: n/a  Glaucoma Screening: UTD      Immunizations:     Influenza: up to date. Tetanus: up to date. Shingles: due for Shingrix - script provided. Pneumonia: up to dateup to date. Cancer screening:    Prostate: guidelines reviewed, n/a. Colon: guidelines reviewed, up to date.        Patient Care Team:  Suzan Tavarez Ronny Tejeda MD as PCP - General (Internal Medicine)  Sonia Maguire MD (Ophthalmology)  Jeffrey Beckham MD as Physician (Urology)  Julienne Ivy DPM (Podiatry)  Yobany Doyle MD (Pulmonary Disease)  Mac Marquez MD (Gastroenterology)  Paulina Arroyo MD (Gastroenterology)       In addition to the above issues we also reviewed the following acute and/or chronic problems:    Endocrine Review  He is seen for diabetes. Since last visit he reports: no significant changes. Home glucose monitoring: he is checking sugars 2/week. He reports sugars are fluctuating 110-150 at the same time, he is questioning accuracy of meter. Patient did not bring glucose log to this visit. He reports medication compliance: compliant all of the time. Medication side effects: none. Diabetic diet compliance: compliant all of the time. Lab review: labs reviewed and discussed with patient. Cardiovascular Review  The patient has hypertension and hyperlipidemia. Since last visit: no changes. He reports taking medications as instructed, no medication side effects noted, patient does not perform home BP monitoring. Diet and Lifestyle: generally follows a low fat low cholesterol diet, generally follows a low sodium diet, sedentary. Labs: reviewed and discussed with patient. Mental Health Review  Patient is seen for depression. Since last visit: no changes. PHQ9 reviewed. Reports experiences the following side effects from the treatment: none. The following sections were reviewed & updated as appropriate: PMH, PSH, FH, and SH.       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 Authorizing Provider   lisinopril (PRINIVIL, ZESTRIL) 5 mg tablet Take 1 Tab by mouth daily. 2/19/19  Yes Tree Gill MD   metFORMIN ER (GLUCOPHAGE XR) 500 mg tablet 1 tab PO in AM and 2 tabs PO in PM 10/30/18  Yes Tree Gill MD   metoprolol tartrate (LOPRESSOR) 25 mg tablet TAKE 1/2 TABLET BY MOUTH TWICE DAILY 10/30/18  Yes Tree Gill MD   sertraline (ZOLOFT) 50 mg tablet TAKE 1 AND 1/2 TABLETS BY MOUTH DAILY 10/4/18  Yes Tree Gill MD   gabapentin (NEURONTIN) 100 mg capsule TAKE 1 CAPSULE BY MOUTH THREE TIMES DAILY 10/2/18  Yes Tree Gill MD   simvastatin (ZOCOR) 40 mg tablet TAKE 1 TABLET BY MOUTH NIGHTLY 10/2/18  Yes Tree Gill MD   mesalamine DR (ASACOL HD) 800 mg DR tablet Take 1,600 mg by mouth three (3) times daily. Yes Provider, Historical   dicyclomine (BENTYL) 10 mg capsule Take 10 mg by mouth three (3) times daily as needed. Yes Provider, Historical   TRUE METRIX GLUCOSE TEST STRIP strip TEST ONCE DAILY AS DIRECTED 11/29/17  Yes Tree Gill MD   MULTIVITAMIN PO Take 1 Tab by mouth daily. Yes Provider, Historical   cholecalciferol, vitamin D3, (VITAMIN D3) 2,000 unit tab Take 2,000 Units by mouth daily. Yes Provider, Historical   melatonin tab tablet Take 5 mg by mouth nightly. Yes Provider, Historical   Blood-Glucose Meter (TRUE METRIX GLUCOSE METER) misc Use to test blood sugars daily. DX:  E11.49 11/10/16  Yes Tree Gill MD   glucose blood VI test strips (TRUE METRIX GLUCOSE TEST STRIP) strip Test blood sugars daily. DX: E11.49 11/10/16  Yes Tree Gill MD   VIT B6/MAG CIT & OX/POTASS CIT (THERALITH XR PO) Take 2 tablets by mouth two (2) times a day.    Yes Provider, Historical   Lancets & Blood Glucose Strips Cmpk Element glucometer 2/26/13  Yes Doris Medina MD   aspirin 81 mg tablet Take 81 mg by mouth nightly. Indications: myocardial infarction prevention   Yes Provider, Historical          Allergies   Allergen Reactions    Apriso [Mesalamine] Diarrhea    Prednisone Other (comments)     constipation    Sulfasalazine Hives              Review of Systems   Constitutional: Negative for chills and fever. Respiratory: Negative for cough and shortness of breath. Cardiovascular: Negative for chest pain and palpitations. Gastrointestinal: Negative for abdominal pain, blood in stool, constipation, diarrhea, heartburn, nausea and vomiting. He reports 2-3 times per year having stool incontinence. Urgency out of the blue. No blood or pain. Once he has a BM he is fine   Musculoskeletal: Negative for joint pain and myalgias. Neurological: Negative for tingling, focal weakness and headaches. Endo/Heme/Allergies: Does not bruise/bleed easily. Psychiatric/Behavioral: Negative for depression. The patient is not nervous/anxious and does not have insomnia. Objective:   Physical Exam   Constitutional: No distress. obese   HENT:   Mouth/Throat: Mucous membranes are normal.   Eyes: Conjunctivae are normal. No scleral icterus. Neck: Neck supple. Cardiovascular: Regular rhythm and normal heart sounds. No murmur heard. Pulmonary/Chest: Effort normal and breath sounds normal. He has no wheezes. He has no rales. Abdominal: Soft. Bowel sounds are normal. He exhibits no mass. There is no hepatosplenomegaly. There is no tenderness. Musculoskeletal: He exhibits no edema. Lymphadenopathy:     He has no cervical adenopathy. Neurological:   Left Foot:   Visual Exam: normal    Pulse DP: 1+ (weak)   Filament test: normal sensation   Right Foot:   Visual Exam: normal    Pulse DP: 1+ (weak)   Filament test: normal sensation     Skin: No rash noted.    Psychiatric: He has a normal mood and affect. His behavior is normal.          Visit Vitals  /68   Pulse 68   Temp 97.5 °F (36.4 °C) (Oral)   Resp 16   Ht 6' 0.2\" (1.834 m)   Wt 215 lb (97.5 kg)   SpO2 97%   BMI 29.00 kg/m²          Advised him to call back or return to office if symptoms worsen/change/persist.  Discussed expected course/resolution/complications of diagnosis in detail with patient. Medication risks/benefits/costs/interactions/alternatives discussed with patient. He was given an after visit summary which includes diagnoses, current medications, & vitals. He expressed understanding with the diagnosis and plan. Aspects of this note may have been generated using voice recognition software. Despite editing, there may be some syntax errors.        Adalberto Lemos MD

## 2019-03-14 DIAGNOSIS — F33.1 MODERATE EPISODE OF RECURRENT MAJOR DEPRESSIVE DISORDER (HCC): ICD-10-CM

## 2019-03-14 DIAGNOSIS — E11.42 DIABETIC PERIPHERAL NEUROPATHY (HCC): ICD-10-CM

## 2019-03-14 LAB
ALBUMIN SERPL-MCNC: 4.3 G/DL (ref 3.5–4.8)
ALBUMIN/CREAT UR: 15.9 MG/G CREAT (ref 0–30)
ALBUMIN/GLOB SERPL: 1.7 {RATIO} (ref 1.2–2.2)
ALP SERPL-CCNC: 62 IU/L (ref 39–117)
ALT SERPL-CCNC: 43 IU/L (ref 0–44)
AST SERPL-CCNC: 38 IU/L (ref 0–40)
BILIRUB SERPL-MCNC: 0.3 MG/DL (ref 0–1.2)
BUN SERPL-MCNC: 11 MG/DL (ref 8–27)
BUN/CREAT SERPL: 11 (ref 10–24)
CALCIUM SERPL-MCNC: 9.9 MG/DL (ref 8.6–10.2)
CHLORIDE SERPL-SCNC: 101 MMOL/L (ref 96–106)
CHOLEST SERPL-MCNC: 130 MG/DL (ref 100–199)
CO2 SERPL-SCNC: 22 MMOL/L (ref 20–29)
CREAT SERPL-MCNC: 0.98 MG/DL (ref 0.76–1.27)
CREAT UR-MCNC: 110.6 MG/DL
ERYTHROCYTE [DISTWIDTH] IN BLOOD BY AUTOMATED COUNT: 13.7 % (ref 12.3–15.4)
EST. AVERAGE GLUCOSE BLD GHB EST-MCNC: 169 MG/DL
GLOBULIN SER CALC-MCNC: 2.6 G/DL (ref 1.5–4.5)
GLUCOSE SERPL-MCNC: 126 MG/DL (ref 65–99)
HBA1C MFR BLD: 7.5 % (ref 4.8–5.6)
HCT VFR BLD AUTO: 43.8 % (ref 37.5–51)
HDLC SERPL-MCNC: 45 MG/DL
HGB BLD-MCNC: 14.8 G/DL (ref 13–17.7)
LDLC SERPL CALC-MCNC: 57 MG/DL (ref 0–99)
MCH RBC QN AUTO: 31.3 PG (ref 26.6–33)
MCHC RBC AUTO-ENTMCNC: 33.8 G/DL (ref 31.5–35.7)
MCV RBC AUTO: 93 FL (ref 79–97)
MICROALBUMIN UR-MCNC: 17.6 UG/ML
PLATELET # BLD AUTO: 203 X10E3/UL (ref 150–379)
POTASSIUM SERPL-SCNC: 4.6 MMOL/L (ref 3.5–5.2)
PROT SERPL-MCNC: 6.9 G/DL (ref 6–8.5)
RBC # BLD AUTO: 4.73 X10E6/UL (ref 4.14–5.8)
SODIUM SERPL-SCNC: 140 MMOL/L (ref 134–144)
TRIGL SERPL-MCNC: 139 MG/DL (ref 0–149)
VLDLC SERPL CALC-MCNC: 28 MG/DL (ref 5–40)
WBC # BLD AUTO: 7.6 X10E3/UL (ref 3.4–10.8)

## 2019-03-14 RX ORDER — GABAPENTIN 100 MG/1
CAPSULE ORAL
Qty: 270 CAP | Refills: 1 | Status: SHIPPED | OUTPATIENT
Start: 2019-03-14 | End: 2019-09-17 | Stop reason: SDUPTHER

## 2019-03-14 RX ORDER — SERTRALINE HYDROCHLORIDE 50 MG/1
TABLET, FILM COATED ORAL
Qty: 135 TAB | Refills: 1 | Status: SHIPPED | OUTPATIENT
Start: 2019-03-14 | End: 2019-09-17 | Stop reason: SDUPTHER

## 2019-03-14 RX ORDER — LANCETS
EACH MISCELLANEOUS
Qty: 100 EACH | Refills: 1 | Status: SHIPPED | OUTPATIENT
Start: 2019-03-14

## 2019-03-14 RX ORDER — SIMVASTATIN 40 MG/1
TABLET, FILM COATED ORAL
Qty: 90 TAB | Refills: 1 | Status: SHIPPED | OUTPATIENT
Start: 2019-03-14 | End: 2019-09-17 | Stop reason: SDUPTHER

## 2019-03-14 NOTE — PROGRESS NOTES
Letter sent to patient. All labs are stable or at goal except for worsening DM control.   A1c up 1 pt in the last year, life style changes to start as he has had poor diet/exercise, will need to increase MTF or add in another med if no improvement

## 2019-04-18 DIAGNOSIS — I25.10 CORONARY ARTERY DISEASE INVOLVING NATIVE CORONARY ARTERY WITHOUT ANGINA PECTORIS, UNSPECIFIED WHETHER NATIVE OR TRANSPLANTED HEART: ICD-10-CM

## 2019-04-18 RX ORDER — METFORMIN HYDROCHLORIDE 500 MG/1
TABLET, EXTENDED RELEASE ORAL
Qty: 270 TAB | Refills: 1 | Status: SHIPPED | OUTPATIENT
Start: 2019-04-18 | End: 2019-11-04 | Stop reason: SDUPTHER

## 2019-04-18 RX ORDER — METOPROLOL TARTRATE 25 MG/1
TABLET, FILM COATED ORAL
Qty: 90 TAB | Refills: 1 | Status: SHIPPED | OUTPATIENT
Start: 2019-04-18 | End: 2019-09-17 | Stop reason: SDUPTHER

## 2019-06-05 RX ORDER — LISINOPRIL 5 MG/1
5 TABLET ORAL DAILY
Qty: 90 TAB | Refills: 0 | Status: SHIPPED | OUTPATIENT
Start: 2019-06-05 | End: 2019-09-03 | Stop reason: SDUPTHER

## 2019-07-08 ENCOUNTER — TELEPHONE (OUTPATIENT)
Dept: INTERNAL MEDICINE CLINIC | Age: 79
End: 2019-07-08

## 2019-07-08 NOTE — TELEPHONE ENCOUNTER
Rutha Gosselin (Self) [phone] (H)     Pt says that dr Chris Gandara sent him to dr mason and that dr mason was suppose to send notes to dr Chris Gandara. He wonders if he has received anything from him?

## 2019-07-09 NOTE — TELEPHONE ENCOUNTER
Patient notified per dr. Pastora Mccullough that notes were received from Dr. Monserrat Alvarez & scanned into chart, he verbalized understanding.

## 2019-09-03 RX ORDER — LISINOPRIL 5 MG/1
5 TABLET ORAL DAILY
Qty: 90 TAB | Refills: 0 | Status: SHIPPED | OUTPATIENT
Start: 2019-09-03 | End: 2019-10-02 | Stop reason: SDUPTHER

## 2019-09-10 ENCOUNTER — TELEPHONE (OUTPATIENT)
Dept: INTERNAL MEDICINE CLINIC | Age: 79
End: 2019-09-10

## 2019-09-10 DIAGNOSIS — E11.42 DIABETIC PERIPHERAL NEUROPATHY (HCC): Primary | ICD-10-CM

## 2019-09-13 ENCOUNTER — HOSPITAL ENCOUNTER (OUTPATIENT)
Dept: LAB | Age: 79
Discharge: HOME | End: 2019-09-13
Payer: MEDICARE

## 2019-09-13 PROCEDURE — 80053 COMPREHEN METABOLIC PANEL: CPT

## 2019-09-13 PROCEDURE — 36415 COLL VENOUS BLD VENIPUNCTURE: CPT

## 2019-09-13 PROCEDURE — 83036 HEMOGLOBIN GLYCOSYLATED A1C: CPT

## 2019-09-14 LAB
ALBUMIN SERPL-MCNC: 4.4 G/DL (ref 3.5–4.8)
ALBUMIN/GLOB SERPL: 1.9 {RATIO} (ref 1.2–2.2)
ALP SERPL-CCNC: 71 IU/L (ref 39–117)
ALT SERPL-CCNC: 35 IU/L (ref 0–44)
AST SERPL-CCNC: 34 IU/L (ref 0–40)
BILIRUB SERPL-MCNC: 0.5 MG/DL (ref 0–1.2)
BUN SERPL-MCNC: 9 MG/DL (ref 8–27)
BUN/CREAT SERPL: 10 (ref 10–24)
CALCIUM SERPL-MCNC: 10.1 MG/DL (ref 8.6–10.2)
CHLORIDE SERPL-SCNC: 99 MMOL/L (ref 96–106)
CO2 SERPL-SCNC: 25 MMOL/L (ref 20–29)
CREAT SERPL-MCNC: 0.92 MG/DL (ref 0.76–1.27)
EST. AVERAGE GLUCOSE BLD GHB EST-MCNC: 157 MG/DL
GLOBULIN SER CALC-MCNC: 2.3 G/DL (ref 1.5–4.5)
GLUCOSE SERPL-MCNC: 113 MG/DL (ref 65–99)
HBA1C MFR BLD: 7.1 % (ref 4.8–5.6)
POTASSIUM SERPL-SCNC: 4.6 MMOL/L (ref 3.5–5.2)
PROT SERPL-MCNC: 6.7 G/DL (ref 6–8.5)
SODIUM SERPL-SCNC: 141 MMOL/L (ref 134–144)

## 2019-09-17 ENCOUNTER — OFFICE VISIT (OUTPATIENT)
Dept: INTERNAL MEDICINE CLINIC | Age: 79
End: 2019-09-17

## 2019-09-17 VITALS
HEIGHT: 72 IN | BODY MASS INDEX: 29.8 KG/M2 | DIASTOLIC BLOOD PRESSURE: 66 MMHG | HEART RATE: 64 BPM | WEIGHT: 220 LBS | TEMPERATURE: 97.6 F | OXYGEN SATURATION: 98 % | SYSTOLIC BLOOD PRESSURE: 118 MMHG | RESPIRATION RATE: 16 BRPM

## 2019-09-17 DIAGNOSIS — E66.3 OVERWEIGHT (BMI 25.0-29.9): ICD-10-CM

## 2019-09-17 DIAGNOSIS — E11.42 DIABETIC PERIPHERAL NEUROPATHY (HCC): ICD-10-CM

## 2019-09-17 DIAGNOSIS — K51.911 ULCERATIVE COLITIS WITH RECTAL BLEEDING, UNSPECIFIED LOCATION (HCC): ICD-10-CM

## 2019-09-17 DIAGNOSIS — F33.1 MODERATE EPISODE OF RECURRENT MAJOR DEPRESSIVE DISORDER (HCC): ICD-10-CM

## 2019-09-17 DIAGNOSIS — I10 ESSENTIAL HYPERTENSION, BENIGN: ICD-10-CM

## 2019-09-17 DIAGNOSIS — E78.00 PURE HYPERCHOLESTEROLEMIA: ICD-10-CM

## 2019-09-17 DIAGNOSIS — I25.10 CORONARY ARTERY DISEASE INVOLVING NATIVE CORONARY ARTERY WITHOUT ANGINA PECTORIS, UNSPECIFIED WHETHER NATIVE OR TRANSPLANTED HEART: Primary | ICD-10-CM

## 2019-09-17 PROBLEM — L97.509 TYPE 2 DIABETES MELLITUS WITH DIABETIC TOE ULCER (HCC): Status: RESOLVED | Noted: 2019-09-17 | Resolved: 2019-09-17

## 2019-09-17 PROBLEM — L97.509 TYPE 2 DIABETES MELLITUS WITH DIABETIC TOE ULCER (HCC): Status: ACTIVE | Noted: 2019-09-17

## 2019-09-17 PROBLEM — E11.621 TYPE 2 DIABETES MELLITUS WITH DIABETIC TOE ULCER (HCC): Status: RESOLVED | Noted: 2019-09-17 | Resolved: 2019-09-17

## 2019-09-17 PROBLEM — E11.621 TYPE 2 DIABETES MELLITUS WITH DIABETIC TOE ULCER (HCC): Status: ACTIVE | Noted: 2019-09-17

## 2019-09-17 RX ORDER — LISINOPRIL 5 MG/1
5 TABLET ORAL DAILY
Qty: 90 TAB | Refills: 0 | Status: CANCELLED | OUTPATIENT
Start: 2019-09-17

## 2019-09-17 RX ORDER — DICYCLOMINE HYDROCHLORIDE 10 MG/1
10 CAPSULE ORAL
Qty: 90 CAP | Refills: 2 | Status: SHIPPED | OUTPATIENT
Start: 2019-09-17 | End: 2020-09-14

## 2019-09-17 RX ORDER — METOPROLOL TARTRATE 25 MG/1
TABLET, FILM COATED ORAL
Qty: 90 TAB | Refills: 1 | Status: SHIPPED | OUTPATIENT
Start: 2019-09-17 | End: 2020-03-31 | Stop reason: SDUPTHER

## 2019-09-17 RX ORDER — GABAPENTIN 100 MG/1
CAPSULE ORAL
Qty: 270 CAP | Refills: 1 | Status: SHIPPED | OUTPATIENT
Start: 2019-09-17 | End: 2020-03-31 | Stop reason: SDUPTHER

## 2019-09-17 RX ORDER — SIMVASTATIN 40 MG/1
TABLET, FILM COATED ORAL
Qty: 90 TAB | Refills: 1 | Status: SHIPPED | OUTPATIENT
Start: 2019-09-17 | End: 2020-03-29

## 2019-09-17 RX ORDER — SERTRALINE HYDROCHLORIDE 50 MG/1
TABLET, FILM COATED ORAL
Qty: 135 TAB | Refills: 1 | Status: SHIPPED | OUTPATIENT
Start: 2019-09-17 | End: 2020-03-31 | Stop reason: SDUPTHER

## 2019-09-17 NOTE — PROGRESS NOTES
Farzana Solorzano is a 69 y.o. male who was seen in clinic today (9/17/2019). Assessment & Plan:     Diagnoses and all orders for this visit:    1. Coronary artery disease involving native coronary artery without angina pectoris, unspecified whether native or transplanted heart- well controlled, asymptomatic. BP is well controlled, lipids are well controlled. Continue: current plan. -     metoprolol tartrate (LOPRESSOR) 25 mg tablet; TAKE 1/2 TABLET BY MOUTH TWICE DAILY    2. Essential hypertension, benign- well controlled, continue current treatment     3. Pure hypercholesterolemia- at goal, continue current treatment     4. Moderate episode of recurrent major depressive disorder (HCC)  -     sertraline (ZOLOFT) 50 mg tablet; TAKE 1 AND 1/2 TABLETS BY MOUTH DAILY    5. Diabetic peripheral neuropathy ([facility] [address]- improved, well controlled, home glucose monitoring emphasized, long term diabetic complications discussed, reviewed following up with specialists and/or referrals placed below and continue current plan. -     gabapentin (NEURONTIN) 100 mg capsule; TAKE 1 CAPSULE BY MOUTH THREE TIMES DAILY  -     simvastatin (ZOCOR) 40 mg tablet; TAKE 1 TABLET BY MOUTH NIGHTLY    6. Ulcerative colitis with rectal bleeding, unspecified location ([facility] [address]- stable, having some diarrhea and constipation on/off, using meds w/ good relief, no changes  -     dicyclomine (BENTYL) 10 mg capsule; Take 1 Cap by mouth three (3) times daily as needed for Pain or Diarrhea. 7. Overweight (BMI 25.0-29. 9)- stable, needs improvement, I have recommended the following interventions: encourage exercise and lifestyle education regarding diet. Follow-up and Dispositions    · Return in about 6 months (around 3/17/2020) for FULL PHYSICAL - 30 minutes. Subjective:   Rama Toledo was seen today for Diabetes; Cholesterol Problem; and Hypertension    Endocrine Review  He is seen for diabetes and obesity.   Since last visit he reports: no significant changes. Home glucose monitoring: is performed regularly, fasting values range 120-130's. Patient did not bring glucose log to this visit. He is checking his sugars 2 time per week. He reports medication compliance: compliant all of the time. Medication side effects: none. Diabetic diet compliance: compliant all of the time. Lab review: labs reviewed and discussed with patient. Cardiovascular Review  The patient has CAD, hypertension and hyperlipidemia. Since last visit: no changes. He reports taking medications as instructed, no medication side effects noted, patient does not perform home BP monitoring. Diet and Lifestyle: generally follows a low fat low cholesterol diet, generally follows a low sodium diet, sedentary. Labs: reviewed and discussed with patient. Mental Health Review  Patient is seen for depression. Since last visit: he did see neuropsychologist.  No [facility]. Memory changes are likely due mood. He denies any: depressed mood, hopelessness, anxiety  . Reports experiences the following side effects from the treatment: none. Brief Labs:     Lab Results   Component Value Date/Time    Sodium 141 09/13/2019 11:01 AM    Potassium 4.6 09/13/2019 11:01 AM    Creatinine 0.92 09/13/2019 11:01 AM    Cholesterol, total 130 03/13/2019 04:38 PM    HDL Cholesterol 45 03/13/2019 04:38 PM    LDL, calculated 57 03/13/2019 04:38 PM    Triglyceride 139 03/13/2019 04:38 PM    Hemoglobin A1c 7.1 09/13/2019 11:01 AM          Prior to Admission medications    Medication Sig Start Date End Date Taking? Authorizing Provider   lisinopril (PRINIVIL, ZESTRIL) 5 mg tablet Take 1 Tab by mouth daily.  9/3/19  Yes Simone Garzon MD   metFORMIN ER (GLUCOPHAGE XR) 500 mg tablet 1 tab PO in AM and 2 tabs PO in PM 4/18/19  Yes Simone Garzon MD   metoprolol tartrate (LOPRESSOR) 25 mg tablet TAKE 1/2 TABLET BY MOUTH TWICE DAILY 4/18/19  Yes Simone Garzon MD sertraline (ZOLOFT) 50 mg tablet TAKE 1 AND 1/2 TABLETS BY MOUTH DAILY 3/14/19  Yes Flash Castelan MD   gabapentin (NEURONTIN) 100 mg capsule TAKE 1 CAPSULE BY MOUTH THREE TIMES DAILY 3/14/19  Yes Flash Castelan MD   simvastatin (ZOCOR) 40 mg tablet TAKE 1 TABLET BY MOUTH NIGHTLY 3/14/19  Yes Flash Castelan MD   lancets misc Use daily as directed DX: E11.49 3/14/19  Yes Flash Castelan MD   glucose blood VI test strips (TRUE METRIX GLUCOSE TEST STRIP) strip TEST ONCE DAILY AS DIRECTED 3/13/19  Yes Flash Castelan MD   Blood-Glucose Meter (TRUE METRIX GLUCOSE METER) misc Use to test blood sugars daily. DX:  E11.49 3/13/19  Yes Flash Castelan MD   mesalamine DR (ASACOL HD) 800 mg DR tablet Take 1,600 mg by mouth three (3) times daily. Yes Provider, Historical   dicyclomine (BENTYL) 10 mg capsule Take 10 mg by mouth three (3) times daily as needed. Yes Provider, Historical   MULTIVITAMIN PO Take 1 Tab by mouth daily. Yes Provider, Historical   cholecalciferol, vitamin D3, (VITAMIN D3) 2,000 unit tab Take 2,000 Units by mouth daily. Yes Provider, Historical   melatonin tab tablet Take 5 mg by mouth nightly. Yes Provider, Historical   glucose blood VI test strips (TRUE METRIX GLUCOSE TEST STRIP) strip Test blood sugars daily. DX: E11.49 11/10/16  Yes Flash Castelan MD   VIT B6/MAG CIT & OX/POTASS CIT (THERALITH XR PO) Take 2 tablets by mouth two (2) times a day. Yes Provider, Historical   Lancets & Blood Glucose Strips Cmpk Element glucometer 2/26/13  Yes Bridget St MD   aspirin 81 mg tablet Take 81 mg by mouth nightly.  Indications: myocardial infarction prevention   Yes Provider, Historical   varicella-zoster recombinant, PF, (SHINGRIX, PF,) 50 mcg/0.5 mL susr injection 0.5mL by IntraMUSCular route once now and then repeat in 2-6 months 3/13/19   Flash Castelan MD          Allergies   Allergen Reactions   Teresita Leonel [Mesalamine] Diarrhea    Prednisone Other (comments)     constipation    Sulfasalazine Hives           Review of Systems   Respiratory: Negative for cough and shortness of breath. Cardiovascular: Negative for chest pain and palpitations. Gastrointestinal: Negative for abdominal pain, constipation, diarrhea, nausea and vomiting. Objective:   Physical Exam   Constitutional: No distress. HENT:   Mouth/Throat: Mucous membranes are normal.   Neck: Neck supple. Cardiovascular: Regular rhythm and normal heart sounds. No murmur heard. Pulmonary/Chest: Effort normal and breath sounds normal. He has no wheezes. He has no rales. Abdominal: Soft. Bowel sounds are normal. He exhibits no mass. There is no hepatosplenomegaly. There is no tenderness. Musculoskeletal: He exhibits no edema. Lymphadenopathy:     He has no cervical adenopathy. Neurological:   Left Foot:   Visual Exam: normal    Pulse DP: 1+ (weak)   Filament test: normal sensation   Right Foot:   Visual Exam: normal    Pulse DP: 1+ (weak)   Filament test: normal sensation    Skin: No rash noted. Psychiatric: He has a normal mood and affect. His behavior is normal.         Visit Vitals  /66   Pulse 64   Temp 97.6 °F (36.4 °C) (Oral)   Resp 16   Ht 6' 0.2\" (1.834 m)   Wt 220 lb (99.8 kg)   SpO2 98%   BMI 29.67 kg/m²         Disclaimer:  Advised him to call back or return to office if symptoms worsen/change/persist.  Discussed expected course/resolution/complications of diagnosis in detail with patient. Medication risks/benefits/costs/interactions/alternatives discussed with patient. He was given an after visit summary which includes diagnoses, current medications, & vitals. He expressed understanding with the diagnosis and plan. Aspects of this note may have been generated using voice recognition software. Despite editing, there may be some syntax errors.        Marilu Restrepo MD

## 2019-10-02 RX ORDER — LISINOPRIL 5 MG/1
TABLET ORAL
Qty: 90 TAB | Refills: 1 | Status: SHIPPED | OUTPATIENT
Start: 2019-10-02 | End: 2020-03-29

## 2019-11-04 RX ORDER — METFORMIN HYDROCHLORIDE 500 MG/1
TABLET, EXTENDED RELEASE ORAL
Qty: 270 TAB | Refills: 1 | Status: SHIPPED | OUTPATIENT
Start: 2019-11-04 | End: 2020-04-24

## 2020-02-11 ENCOUNTER — OFFICE VISIT (OUTPATIENT)
Dept: INTERNAL MEDICINE CLINIC | Age: 80
End: 2020-02-11

## 2020-02-11 ENCOUNTER — DOCUMENTATION ONLY (OUTPATIENT)
Dept: INTERNAL MEDICINE CLINIC | Age: 80
End: 2020-02-11

## 2020-02-11 VITALS
HEART RATE: 88 BPM | OXYGEN SATURATION: 96 % | DIASTOLIC BLOOD PRESSURE: 76 MMHG | BODY MASS INDEX: 30.31 KG/M2 | RESPIRATION RATE: 20 BRPM | TEMPERATURE: 97.7 F | HEIGHT: 72 IN | SYSTOLIC BLOOD PRESSURE: 128 MMHG | WEIGHT: 223.8 LBS

## 2020-02-11 DIAGNOSIS — J01.00 ACUTE NON-RECURRENT MAXILLARY SINUSITIS: Primary | ICD-10-CM

## 2020-02-11 RX ORDER — AMOXICILLIN 875 MG/1
875 TABLET, FILM COATED ORAL 2 TIMES DAILY
Qty: 14 TAB | Refills: 0 | Status: SHIPPED | OUTPATIENT
Start: 2020-02-11 | End: 2020-02-18

## 2020-02-11 NOTE — PATIENT INSTRUCTIONS
Sinusitis: Care Instructions  Your Care Instructions    Sinusitis is an infection of the lining of the sinus cavities in your head. Sinusitis often follows a cold. It causes pain and pressure in your head and face. In most cases, sinusitis gets better on its own in 1 to 2 weeks. But some mild symptoms may last for several weeks. Sometimes antibiotics are needed. Follow-up care is a key part of your treatment and safety. Be sure to make and go to all appointments, and call your doctor if you are having problems. It's also a good idea to know your test results and keep a list of the medicines you take. How can you care for yourself at home? · Take an over-the-counter pain medicine, such as acetaminophen (Tylenol), ibuprofen (Advil, Motrin), or naproxen (Aleve). Read and follow all instructions on the label. · If the doctor prescribed antibiotics, take them as directed. Do not stop taking them just because you feel better. You need to take the full course of antibiotics. · Be careful when taking over-the-counter cold or flu medicines and Tylenol at the same time. Many of these medicines have acetaminophen, which is Tylenol. Read the labels to make sure that you are not taking more than the recommended dose. Too much acetaminophen (Tylenol) can be harmful. · Breathe warm, moist air from a steamy shower, a hot bath, or a sink filled with hot water. Avoid cold, dry air. Using a humidifier in your home may help. Follow the directions for cleaning the machine. · Use saline (saltwater) nasal washes to help keep your nasal passages open and wash out mucus and bacteria. You can buy saline nose drops at a grocery store or drugstore. Or you can make your own at home by adding 1 teaspoon of salt and 1 teaspoon of baking soda to 2 cups of distilled water. If you make your own, fill a bulb syringe with the solution, insert the tip into your nostril, and squeeze gently. Alease Ruffini your nose.   · Put a hot, wet towel or a warm gel pack on your face 3 or 4 times a day for 5 to 10 minutes each time. · Try a decongestant nasal spray like oxymetazoline (Afrin). Do not use it for more than 3 days in a row. Using it for more than 3 days can make your congestion worse. When should you call for help? Call your doctor now or seek immediate medical care if:    · You have new or worse swelling or redness in your face or around your eyes.     · You have a new or higher fever.    Watch closely for changes in your health, and be sure to contact your doctor if:    · You have new or worse facial pain.     · The mucus from your nose becomes thicker (like pus) or has new blood in it.     · You are not getting better as expected. Where can you learn more? Go to http://jesús-jhoana.info/. Enter C287 in the search box to learn more about \"Sinusitis: Care Instructions. \"  Current as of: October 21, 2018  Content Version: 12.2  © 7629-7083 [employer]. Care instructions adapted under license by [employer] (which disclaims liability or warranty for this information). If you have questions about a medical condition or this instruction, always ask your healthcare professional. [place] [address] any warranty or liability for your use of this information.

## 2020-02-11 NOTE — PROGRESS NOTES
VO given to [facility] Pharmacist for amoxil 875mg bid x7d per Dr Lelia Wellington. E-scribe failed.

## 2020-02-11 NOTE — PROGRESS NOTES
Noemi Saeed is a 67 y.o. male who was seen in clinic today (2/11/2020) for an acute visit. Assessment & Plan:     Diagnoses and all orders for this visit:    1. Acute non-recurrent maxillary sinusitis- this is a problem and it is getting worse, discussed differential diagnosis and at this time favor a bacterial etiology. Treatment options reviewed, including prescription & OTC options. Will start meds below. Red flags were reviewed, expected time course for resolution was discussed, and when to RTC or notify me of changes was discussed with him. He will update me in 3-5 days if no improvement   -     amoxicillin (AMOXIL) 875 mg tablet; Take 1 Tab by mouth two (2) times a day for 7 days. Follow-up and Dispositions    · Return if symptoms worsen or fail to improve. Subjective:   Adina Dean was seen today for Cold Symptoms    URI Review  Adina Dean returns to clinic today to talk about: URI symptoms that started 2-3 weeks ago, and is gradually worsening since that time. He reports bilateral ear fullness, post nasal drip, rhinorrhea, productive cough (worse in AM), chest congestion and eye discharge (bilaterally). Treatments have included: cough drops which have been somewhat effective & temporarily effective. Relevant PMH: No pertinent additional PMH. Patient reports sick contacts: no.         Prior to Admission medications    Medication Sig Start Date End Date Taking?  Authorizing Provider   metFORMIN ER (GLUCOPHAGE XR) 500 mg tablet TAKE 1 TABLET BY MOUTH EVERY MORNING AND TAKE 2 TABLETS BY MOUTH EVERY EVENING 11/4/19  Yes Laney Velarde MD   lisinopril (PRINIVIL, ZESTRIL) 5 mg tablet TAKE 1 TABLET BY MOUTH EVERY DAY 10/2/19  Yes Laney Velarde MD   gabapentin (NEURONTIN) 100 mg capsule TAKE 1 CAPSULE BY MOUTH THREE TIMES DAILY 9/17/19  Yes Laney Velarde MD   metoprolol tartrate (LOPRESSOR) 25 mg tablet TAKE 1/2 TABLET BY MOUTH TWICE DAILY 9/17/19  Yes Laney Velarde MD   sertraline (ZOLOFT) 50 mg tablet TAKE 1 AND 1/2 TABLETS BY MOUTH DAILY 9/17/19  Yes Morena Nation MD   simvastatin (ZOCOR) 40 mg tablet TAKE 1 TABLET BY MOUTH NIGHTLY 9/17/19  Yes Morena Nation MD   lancets misc Use daily as directed DX: E11.49 3/14/19  Yes Morena Nation MD   Blood-Glucose Meter (TRUE METRIX GLUCOSE METER) misc Use to test blood sugars daily. DX:  E11.49 3/13/19  Yes Morena Nation MD   mesalamine DR (ASACOL HD) 800 mg DR tablet Take 1,600 mg by mouth three (3) times daily. Yes Provider, Historical   MULTIVITAMIN PO Take 1 Tab by mouth daily. Yes Provider, Historical   cholecalciferol, vitamin D3, (VITAMIN D3) 2,000 unit tab Take 2,000 Units by mouth daily. Yes Provider, Historical   melatonin tab tablet Take 5 mg by mouth nightly. Yes Provider, Historical   glucose blood VI test strips (TRUE METRIX GLUCOSE TEST STRIP) strip Test blood sugars daily. DX: E11.49 11/10/16  Yes Morena Nation MD   VIT B6/MAG CIT & OX/POTASS CIT (THERALITH XR PO) Take 2 tablets by mouth two (2) times a day. Yes Provider, Historical   aspirin 81 mg tablet Take 81 mg by mouth nightly. Indications: myocardial infarction prevention   Yes Provider, Historical   dicyclomine (BENTYL) 10 mg capsule Take 1 Cap by mouth three (3) times daily as needed for Pain or Diarrhea. 9/17/19   Morena Nation MD   glucose blood VI test strips (TRUE METRIX GLUCOSE TEST STRIP) strip TEST ONCE DAILY AS DIRECTED 3/13/19 2/11/20  Morena Nation MD   Lancets & Blood Glucose Strips Cmpk Element glucometer 2/26/13 2/11/20  Zainab Kent MD          Allergies   Allergen Reactions    Apriso [Mesalamine] Diarrhea    Prednisone Other (comments)     constipation    Sulfasalazine Hives           ROS - per HPI        Objective:   Physical Exam  HENT:      Right Ear: Ear canal normal. No middle ear effusion. Tympanic membrane is not erythematous.       Left Ear: Ear canal normal.  No middle ear effusion. Tympanic membrane is not erythematous. Nose: Congestion and rhinorrhea present. Right Turbinates: Not swollen. Left Turbinates: Not swollen. Right Sinus: No maxillary sinus tenderness or frontal sinus tenderness. Left Sinus: No maxillary sinus tenderness or frontal sinus tenderness. Mouth/Throat:      Mouth: Mucous membranes are moist.      Pharynx: No oropharyngeal exudate or posterior oropharyngeal erythema. Cardiovascular:      Rate and Rhythm: Regular rhythm. Heart sounds: No murmur. Pulmonary:      Effort: Pulmonary effort is normal.      Breath sounds: No decreased breath sounds or wheezing. Lymphadenopathy:      Cervical: No cervical adenopathy. Visit Vitals  /76   Pulse 88   Temp 97.7 °F (36.5 °C) (Oral)   Resp 20   Ht 6' 0.2\" (1.834 m)   Wt 223 lb 12.8 oz (101.5 kg)   SpO2 96%   BMI 30.18 kg/m²         Disclaimer:  Advised him to call back or return to office if symptoms worsen/change/persist.  Discussed expected course/resolution/complications of diagnosis in detail with patient. Medication risks/benefits/costs/interactions/alternatives discussed with patient. He was given an after visit summary which includes diagnoses, current medications, & vitals. He expressed understanding with the diagnosis and plan. Aspects of this note may have been generated using voice recognition software. Despite editing, there may be some syntax errors.        Nunu Santamaria MD

## 2020-02-13 ENCOUNTER — TELEPHONE (OUTPATIENT)
Dept: INTERNAL MEDICINE CLINIC | Age: 80
End: 2020-02-13

## 2020-02-13 NOTE — TELEPHONE ENCOUNTER
Tyson Tomlinson (Self) [phone] (H)     Pt says that dr Mk Silva told him to call back and let him know how he was doing. He is still on meds and they seem to be working, feeling much better.

## 2020-03-06 ENCOUNTER — TELEPHONE (OUTPATIENT)
Dept: INTERNAL MEDICINE CLINIC | Age: 80
End: 2020-03-06

## 2020-03-06 RX ORDER — AMOXICILLIN AND CLAVULANATE POTASSIUM 875; 125 MG/1; MG/1
1 TABLET, FILM COATED ORAL EVERY 12 HOURS
Qty: 14 TAB | Refills: 0 | Status: SHIPPED | OUTPATIENT
Start: 2020-03-06 | End: 2020-06-15

## 2020-03-06 NOTE — TELEPHONE ENCOUNTER
He said that he had a sinus infection and cough and he thinks it didn't completely go away. Still have thick mucus and sinus pressure.

## 2020-03-06 NOTE — TELEPHONE ENCOUNTER
Did it go away? If resolved and now returning is likely not related. If never resolved it maybe related but not sure why as he was on a good medication. Augmentin 875mg. 1 tab PO BID x 7 days, #14, RF 0.

## 2020-03-06 NOTE — TELEPHONE ENCOUNTER
Spoke with patient to let him know that an RX was sent to his pharmacy. Patient stated that they closed at 530 and he would not be able to get it until Monday. Asked if he would like it sent to a different pharmacy and patient refused.

## 2020-03-09 ENCOUNTER — TELEPHONE (OUTPATIENT)
Dept: INTERNAL MEDICINE CLINIC | Age: 80
End: 2020-03-09

## 2020-03-09 DIAGNOSIS — E11.49 CONTROLLED TYPE 2 DIABETES MELLITUS WITH OTHER NEUROLOGIC COMPLICATION, WITHOUT LONG-TERM CURRENT USE OF INSULIN (HCC): Primary | ICD-10-CM

## 2020-03-09 NOTE — TELEPHONE ENCOUNTER
----- Message from Pavan Irion sent at 3/9/2020  7:49 AM EDT -----  Regarding: Dr House/telephone  Pt would like to come in and  his Lab Order for his appt on 3-, pt can be reach at [phone] if you have any questions.

## 2020-03-10 NOTE — TELEPHONE ENCOUNTER
Verified patient identity with two identifiers. Spoke with patient by pphone-Labs ready, needs to be fasting, needs to be fasting and urine sample. Patient verbalized understanding.

## 2020-03-11 ENCOUNTER — HOSPITAL ENCOUNTER (OUTPATIENT)
Dept: LAB | Age: 80
Discharge: HOME | End: 2020-03-11
Payer: MEDICARE

## 2020-03-11 PROCEDURE — 82043 UR ALBUMIN QUANTITATIVE: CPT

## 2020-03-11 PROCEDURE — 80061 LIPID PANEL: CPT

## 2020-03-11 PROCEDURE — 83036 HEMOGLOBIN GLYCOSYLATED A1C: CPT

## 2020-03-11 PROCEDURE — 85027 COMPLETE CBC AUTOMATED: CPT

## 2020-03-11 PROCEDURE — 36415 COLL VENOUS BLD VENIPUNCTURE: CPT

## 2020-03-11 PROCEDURE — 80053 COMPREHEN METABOLIC PANEL: CPT

## 2020-03-12 LAB
ALBUMIN SERPL-MCNC: 4.5 G/DL (ref 3.7–4.7)
ALBUMIN/CREAT UR: 7 MG/G CREAT (ref 0–29)
ALBUMIN/GLOB SERPL: 1.6 {RATIO} (ref 1.2–2.2)
ALP SERPL-CCNC: 92 IU/L (ref 39–117)
ALT SERPL-CCNC: 31 IU/L (ref 0–44)
AST SERPL-CCNC: 34 IU/L (ref 0–40)
BILIRUB SERPL-MCNC: 0.5 MG/DL (ref 0–1.2)
BUN SERPL-MCNC: 11 MG/DL (ref 8–27)
BUN/CREAT SERPL: 11 (ref 10–24)
CALCIUM SERPL-MCNC: 9.9 MG/DL (ref 8.6–10.2)
CHLORIDE SERPL-SCNC: 97 MMOL/L (ref 96–106)
CHOLEST SERPL-MCNC: 134 MG/DL (ref 100–199)
CO2 SERPL-SCNC: 18 MMOL/L (ref 20–29)
CREAT SERPL-MCNC: 0.97 MG/DL (ref 0.76–1.27)
CREAT UR-MCNC: 94.3 MG/DL
ERYTHROCYTE [DISTWIDTH] IN BLOOD BY AUTOMATED COUNT: 12.6 % (ref 11.6–15.4)
EST. AVERAGE GLUCOSE BLD GHB EST-MCNC: 183 MG/DL
GLOBULIN SER CALC-MCNC: 2.8 G/DL (ref 1.5–4.5)
GLUCOSE SERPL-MCNC: 169 MG/DL (ref 65–99)
HBA1C MFR BLD: 8 % (ref 4.8–5.6)
HCT VFR BLD AUTO: 40.8 % (ref 37.5–51)
HDLC SERPL-MCNC: 47 MG/DL
HGB BLD-MCNC: 14.2 G/DL (ref 13–17.7)
LDLC SERPL CALC-MCNC: 61 MG/DL (ref 0–99)
MCH RBC QN AUTO: 30.9 PG (ref 26.6–33)
MCHC RBC AUTO-ENTMCNC: 34.8 G/DL (ref 31.5–35.7)
MCV RBC AUTO: 89 FL (ref 79–97)
MICROALBUMIN UR-MCNC: 6.5 UG/ML
PLATELET # BLD AUTO: 170 X10E3/UL (ref 150–450)
POTASSIUM SERPL-SCNC: 4.6 MMOL/L (ref 3.5–5.2)
PROT SERPL-MCNC: 7.3 G/DL (ref 6–8.5)
RBC # BLD AUTO: 4.59 X10E6/UL (ref 4.14–5.8)
SODIUM SERPL-SCNC: 139 MMOL/L (ref 134–144)
TRIGL SERPL-MCNC: 129 MG/DL (ref 0–149)
VLDLC SERPL CALC-MCNC: 26 MG/DL (ref 5–40)
WBC # BLD AUTO: 7.4 X10E3/UL (ref 3.4–10.8)

## 2020-03-13 ENCOUNTER — OFFICE VISIT (OUTPATIENT)
Dept: INTERNAL MEDICINE CLINIC | Age: 80
End: 2020-03-13

## 2020-03-13 VITALS
SYSTOLIC BLOOD PRESSURE: 118 MMHG | BODY MASS INDEX: 30.34 KG/M2 | OXYGEN SATURATION: 97 % | DIASTOLIC BLOOD PRESSURE: 68 MMHG | HEIGHT: 72 IN | WEIGHT: 224 LBS | TEMPERATURE: 98.1 F | HEART RATE: 73 BPM | RESPIRATION RATE: 16 BRPM

## 2020-03-13 DIAGNOSIS — K51.911 ULCERATIVE COLITIS WITH RECTAL BLEEDING, UNSPECIFIED LOCATION (HCC): ICD-10-CM

## 2020-03-13 DIAGNOSIS — I25.10 CORONARY ARTERY DISEASE INVOLVING NATIVE CORONARY ARTERY OF NATIVE HEART WITHOUT ANGINA PECTORIS: ICD-10-CM

## 2020-03-13 DIAGNOSIS — Z71.89 ADVANCED CARE PLANNING/COUNSELING DISCUSSION: ICD-10-CM

## 2020-03-13 DIAGNOSIS — E66.9 OBESITY (BMI 30.0-34.9): ICD-10-CM

## 2020-03-13 DIAGNOSIS — E78.00 PURE HYPERCHOLESTEROLEMIA: ICD-10-CM

## 2020-03-13 DIAGNOSIS — F33.41 RECURRENT MAJOR DEPRESSIVE DISORDER, IN PARTIAL REMISSION (HCC): ICD-10-CM

## 2020-03-13 DIAGNOSIS — I10 ESSENTIAL HYPERTENSION, BENIGN: ICD-10-CM

## 2020-03-13 DIAGNOSIS — Z00.00 MEDICARE ANNUAL WELLNESS VISIT, SUBSEQUENT: Primary | ICD-10-CM

## 2020-03-13 NOTE — PATIENT INSTRUCTIONS
Hearing Evaluations: [facility]   At [address] [employer]  [phone]    [place]ia [place] (off [address])  [place] ([address])  [place] ([address])  [place] ([address])      [address] [facility]   [email or web address]    [phone] --> [address], [facility]  [phone] --> [address]  ([place])  [phone] --> [address] [facility]  ([address] [facility])      [address]  [employer]    [phone]  --> [address], [facility]. [place], [address]          Medicare Wellness Visit, Male    The best way to live healthy is to have a lifestyle where you eat a well-balanced diet, exercise regularly, limit alcohol use, and quit all forms of tobacco/nicotine, if applicable. Regular preventive services are another way to keep healthy. Preventive services (vaccines, screening tests, monitoring & exams) can help personalize your care plan, which helps you manage your own care. Screening tests can find health problems at the earliest stages, when they are easiest to treat. [facility] follows the current, evidence-based guidelines published by the [facility] [place] (USPSTF) when recommending preventive services for our patients. Because we follow these guidelines, sometimes recommendations change over time as research supports it. (For example, a prostate screening blood test is no longer routinely recommended for men with no symptoms). Of course, you and your doctor may decide to screen more often for some diseases, based on your risk and co-morbidities (chronic disease you are already diagnosed with). Preventive services for you include:  - Medicare offers their members a free annual wellness visit, which is time for you and your primary care provider to discuss and plan for your preventive service needs.  Take advantage of this benefit every year!  -All adults over age 72 should receive the recommended pneumonia vaccines. Current USPSTF guidelines recommend a series of two vaccines for the best pneumonia protection.   -All adults should have a flu vaccine yearly and tetanus vaccine every 10 years.  -All adults age 48 and older should receive the shingles vaccines (series of two vaccines). -All adults age 38-68 who are overweight should have a diabetes screening test once every three years.   -Other screening tests & preventive services for persons with diabetes include: an eye exam to screen for diabetic retinopathy, a kidney function test, a foot exam, and stricter control over your cholesterol.   -Cardiovascular screening for adults with routine risk involves an electrocardiogram (ECG) at intervals determined by the provider.   -Colorectal cancer screening should be done for adults age 54-65 with no increased risk factors for colorectal cancer. There are a number of acceptable methods of screening for this type of cancer. Each test has its own benefits and drawbacks. Discuss with your provider what is most appropriate for you during your annual wellness visit. The different tests include: colonoscopy (considered the best screening method), a fecal occult blood test, a fecal DNA test, and sigmoidoscopy.  -All adults [place] between [facility] should be screened once for Hepatitis C.  -An Abdominal Aortic Aneurysm (AAA) Screening is recommended for men age 73-68 who has ever smoked in their lifetime. Here is a list of your current Health Maintenance items (your personalized list of preventive services) with a due date:  Health Maintenance Due   Topic Date Due    Annual Well Visit  03/13/2020          Learning About [employer] Links  What is a living will? A living will is a legal form you use to write down the kind of care you want at the end of your life. It is used by the health professionals who will treat you if you aren't able to decide for yourself.   If you put your wishes in writing, your loved ones and others will know what kind of care you want. They won't need to guess. This can ease your mind and be helpful to others. A living will is not the same as an estate or property will. An estate will explains what you want to happen with your money and property after you die. Is a living will a legal document? A living will is a legal document. Each state has its own laws about living rees. If you move to another state, make sure that your living will is legal in the state where you now live. Or you might use a universal form that has been approved by many states. This kind of form can sometimes be completed and stored online. Your electronic copy will then be available wherever you have a connection to the Internet. In most cases, doctors will respect your wishes even if you have a form from a different state. · You don't need an  to complete a living will. But legal advice can be helpful if your state's laws are unclear, your health history is complicated, or your family can't agree on what should be in your living will. · You can change your living will at any time. Some people find that their wishes about end-of-life care change as their health changes. · In addition to making a living will, think about completing a medical power of  form. This form lets you name the person you want to make end-of-life treatment decisions for you (your \"health care agent\") if you're not able to. Many hospitals and nursing homes will give you the forms you need to complete a living will and a medical power of . · Your living will is used only if you can't make or communicate decisions for yourself anymore. If you become able to make decisions again, you can accept or refuse any treatment, no matter what you wrote in your living will. · Your state may offer an online registry.  This is a place where you can store your living will online so the doctors and nurses who need to treat you can find it right away. What should you think about when creating a living will? Talk about your end-of-life wishes with your family members and your doctor. Let them know what you want. That way the people making decisions for you won't be surprised by your choices. Think about these questions as you make your living will:  · Do you know enough about life support methods that might be used? If not, talk to your doctor so you know what might be done if you can't breathe on your own, your heart stops, or you're unable to swallow. · What things would you still want to be able to do after you receive life-support methods? Would you want to be able to walk? To speak? To eat on your own? To live without the help of machines? · If you have a choice, where do you want to be cared for? In your home? At a hospital or nursing home? · Do you want certain [employer] practices performed if you become very ill? · If you have a choice at the end of your life, where would you prefer to die? At home? In a hospital or nursing home? Somewhere else? · Would you prefer to be buried or cremated? · Do you want your organs to be donated after you die? What should you do with your living will? · Make sure that your family members and your health care agent have copies of your living will. · Give your doctor a copy of your living will to keep in your medical record. If you have more than one doctor, make sure that each one has a copy. · You may want to put a copy of your living will where it can be easily found. Where can you learn more? Go to http://jesús-jhoana.info/. Enter T679 in the search box to learn more about \"Learning About Living Britney Geronimo. \"  Current as of: August 8, 2016  Content Version: 11.3  © 5490-8053 [employer]. Care instructions adapted under license by [employer] (which disclaims liability or warranty for this information).  If you have questions about a medical condition or this instruction, always ask your healthcare professional. [place] [address] any warranty or liability for your use of this information.

## 2020-03-13 NOTE — ACP (ADVANCE CARE PLANNING)
Advance Care Planning (ACP) Provider Note - Comprehensive     Date of ACP Conversation: 03/13/20  Persons included in Conversation:  patient and family  Length of ACP Conversation in minutes: <16 minutes (Non-Billable)    Authorized Decision Maker (if patient is incapable of making informed decisions):   Healthcare Agent/Medical Power of  under Advance Directive      He has an advanced directive - a copy has been provided & still reflects him wishes. Reviewed DNR/DNI and patient is not interested. General ACP for ALL Patients with Decision Making Capacity:  Importance of advance care planning, including choosing a healthcare agent to communicate patient's healthcare decisions if patient lost the ability to make decisions, such as after a sudden illness or accident  Understanding of the healthcare agent role was assessed and information provided  Opportunity offered to explore how cultural, Mandaeism, spiritual, or personal beliefs would affect decisions for future care  Exploration of values, goals, and preferences if recovery is not expected, even with continued medical treatment in the event of: Imminent death or severe, permanent brain injury    For Serious or Chronic Illness:  Understanding of CPR, goals and expected outcomes, benefits and burdens discussed.   Understanding of medical condition  Information on CPR success rates provided (e.g. for CPR in hospital, survival to d/c at two weeks is 22%, for chronically ill or elderly/frail survival is less than 3%)    Interventions Provided:  Recommended communicating the plan and making copies for the healthcare agent, personal physician, and others as appropriate (e.g., health system)  Recommended review of completed ACP document annually or upon change in health status

## 2020-03-13 NOTE — PROGRESS NOTES
Rob Baca is a 67 y.o. male who was seen in clinic today (3/13/2020) for a full physical.  He RTC with his wife. Assessment & Plan:   Diagnoses and all orders for this visit:    1. Medicare annual wellness visit, subsequent    2. Advanced care planning/counseling discussion    3. Uncontrolled diabetes mellitus with diabetic neuropathy ([facility]- poorly controlled, worsening, home glucose monitoring emphasized, long term diabetic complications discussed, reviewed following up with specialists and/or referrals placed below and continue current plan except will add in meds below (assuming affordable). Needs to work on diet, it is poor. -     empagliflozin (JARDIANCE) 10 mg tablet; Take 1 Tab by mouth daily.  -     glucose blood VI test strips (True Metrix Glucose Test Strip) strip; Test blood sugars daily. DX: E11.40, E11.65    4. Coronary artery disease involving native coronary artery of native heart without angina pectoris- well controlled. BP is well controlled, lipids are well controlled. Continue: current plan. 5. Essential hypertension, benign- well controlled, continue current treatment     6. Pure hypercholesterolemia- at goal, continue current treatment     7. Recurrent major depressive disorder, in partial remission ([facility]- stable and well controlled, I reviewed treatment options with him, I reviewed life style changes to help improve mood, continue current treatment. 8. Ulcerative colitis with rectal bleeding, unspecified location [facility])- asymptomatic, defer to specialist, reviewed when to consider postponing due to COVID-19 concerns    9. Obesity (BMI 30.0-34.9)- stable, poorly controlled, needs improvement, I have recommended the following interventions: encourage exercise and lifestyle education regarding diet. Follow-up and Dispositions    · Return in about 3 months (around 6/13/2020), or if symptoms worsen or fail to improve, for Regular follow up. ------------------------------------------------------------------------------------------  Subjective: Annual Wellness Visit- Subsequent Visit    End of Life Planning: This was discussed with him today and he has an advanced directive - a copy has been provided. Reviewed DNR/DNI and patient is not interested. Depression Screen:  3 most recent PHQ Screens 3/13/2020   PHQ Not Done -   Little interest or pleasure in doing things Not at all   Feeling down, depressed, irritable, or hopeless Not at all   Total Score PHQ 2 0   Trouble falling or staying asleep, or sleeping too much -   Feeling tired or having little energy -   Poor appetite, weight loss, or overeating -   Feeling bad about yourself - or that you are a failure or have let yourself or your family down -   Trouble concentrating on things such as school, work, reading, or watching TV -   Moving or speaking so slowly that other people could have noticed; or the opposite being so fidgety that others notice -   Thoughts of being better off dead, or hurting yourself in some way -   PHQ 9 Score -   How difficult have these problems made it for you to do your work, take care of your home and get along with others -         Fall Risk:   Fall Risk Assessment, last 12 mths 3/13/2020   Able to walk? Yes   Fall in past 12 months? No       Abuse Screen:  Abuse Screening Questionnaire 3/13/2020   Do you ever feel afraid of your partner? N   Are you in a relationship with someone who physically or mentally threatens you? N   Is it safe for you to go home? Y       Alcohol Risk Factor Screening:  Alcohol Risk Factor Screening (MALE > 65): Do you average more 1 drink per night or more than 7 drinks a week: No    In the past three months have you have had more than 4 drinks containing alcohol on one occasion: No    Functional Ability and Level of Safety:   Hearing Screen: The patient wears hearing aids. The patient needs further evaluation.     Cognition Screen:  Has your family/caregiver stated any concerns about your memory: yes - he reports memory is not as good as it has bad  Cognitive Screening: Normal - MMSE (Mini Mental Status Exam) - 30/30    Ambulation: with no difficulty    Activities of Daily Living:    Exercise: moderately active  The home contains: handrails and grab bars  Patient does total self care    Adult Nutrition Screen:  No risk factors noted. Health Maintenance:   Daily Aspirin: yes  AAA Screening: n/a  Glaucoma Screening: UTD    Immunizations:    Influenza: up to date   Tetanus: up to date   Shingles: declined   Pneumonia: up to date  Cancer screening:   Prostate: guidelines reviewed, declined  Colon: guidelines reviewed, he has f/u next week. Patient Care Team:  Corinne Christine MD as PCP - General (Internal Medicine)  Corinne Christine MD as PCP - [facility] Provider  Marica Alpers, MD (Ophthalmology)  Kayli Potts MD as Physician (Urology)  Inocencio Guy DPM ([facility])  Ernestine Esteves MD (Pulmonary Disease)  Kaylene Foster MD (Gastroenterology)  Susan Sawant MD (Gastroenterology)       In addition to the above issues we also reviewed the following acute and/or chronic problems:    Cardiovascular Review  The patient has hypertension, hyperlipidemia and coronary artery disease. Since last visit: no changes. He reports taking medications as instructed, no medication side effects noted, patient does not perform home BP monitoring. Diet and Lifestyle: generally follows a low fat low cholesterol diet, generally follows a low sodium diet, exercises sporadically. Labs: reviewed and discussed with patient. Endocrine Review  He is seen for diabetes with neuropathy and obesity. Since last visit he reports: he reports having a cold recently and sugars have been higher since then. He reports having had a \"cold\" for the last few weeks.   He reports DM neuropathy is slightly higher (initially just the toes but now his whole foot). Testing: is performed regularly, fasting minimum reading is 152, maximum reading is 171, and average reading is 160's, patient did not bring glucose log to this visit. He reports medication compliance: compliant all of the time. Medication side effects: none. Diabetic diet compliance: non-compliant all of the time. Lab review: labs reviewed and discussed with patient. Mental Health Review  Patient is seen for depression. Since last visit: no changes. MMSE 30/30. PHQ9 reviewed. Reports experiences the following side effects from the treatment: none.          3 most recent PHQ Screens 3/13/2020   PHQ Not Done -   Little interest or pleasure in doing things Not at all   Feeling down, depressed, irritable, or hopeless Not at all   Total Score PHQ 2 0   Trouble falling or staying asleep, or sleeping too much Not at all   Feeling tired or having little energy Not at all   Poor appetite, weight loss, or overeating Not at all   Feeling bad about yourself - or that you are a failure or have let yourself or your family down Not at all   Trouble concentrating on things such as school, work, reading, or watching TV Not at all   Moving or speaking so slowly that other people could have noticed; or the opposite being so fidgety that others notice Not at all   Thoughts of being better off dead, or hurting yourself in some way Not at all   PHQ 9 Score 0   How difficult have these problems made it for you to do your work, take care of your home and get along with others Not difficult at all           The following sections were reviewed & updated as appropriate: PMH, PSH, FH, and SH.       Patient Active Problem List   Diagnosis Code    Essential hypertension, benign I10    CAD (coronary artery disease) I25.10    DM (diabetes mellitus) type II controlled, neurological manifestation ([facility] E11.49    Pure hypercholesterolemia E78.00    Recurrent major depressive disorder ([facility] F33.9    Chronic back pain M54.9, G89.29    UC (ulcerative colitis) ([facility] K51.90    Pelvic kidney Q63.2    Scoliosis M41.9    Kidney stone N20.0    CARROL (obstructive sleep apnea) G47.33          Prior to Admission medications    Medication Sig Start Date End Date Taking? Authorizing Provider   amoxicillin-clavulanate (AUGMENTIN) 875-125 mg per tablet Take 1 Tab by mouth every twelve (12) hours. 3/6/20  Yes Amarilis Aceves MD   metFORMIN ER (GLUCOPHAGE XR) 500 mg tablet TAKE 1 TABLET BY MOUTH EVERY MORNING AND TAKE 2 TABLETS BY MOUTH EVERY EVENING 11/4/19  Yes Amarilis Aceves MD   lisinopril (PRINIVIL, ZESTRIL) 5 mg tablet TAKE 1 TABLET BY MOUTH EVERY DAY 10/2/19  Yes Amarilis Aceves MD   dicyclomine (BENTYL) 10 mg capsule Take 1 Cap by mouth three (3) times daily as needed for Pain or Diarrhea. 9/17/19  Yes Amarilis Aceves MD   gabapentin (NEURONTIN) 100 mg capsule TAKE 1 CAPSULE BY MOUTH THREE TIMES DAILY 9/17/19  Yes Amarilis Aceves MD   metoprolol tartrate (LOPRESSOR) 25 mg tablet TAKE 1/2 TABLET BY MOUTH TWICE DAILY 9/17/19  Yes Amarilis Aceves MD   sertraline (ZOLOFT) 50 mg tablet TAKE 1 AND 1/2 TABLETS BY MOUTH DAILY 9/17/19  Yes Amarilis Aceves MD   simvastatin (ZOCOR) 40 mg tablet TAKE 1 TABLET BY MOUTH NIGHTLY 9/17/19  Yes Amarilis Aceves MD   lancets misc Use daily as directed DX: E11.49 3/14/19  Yes Amarilis Aceves MD   Blood-Glucose Meter (TRUE METRIX GLUCOSE METER) misc Use to test blood sugars daily. DX:  E11.49 3/13/19  Yes Amarilis Aceves MD   mesalamine DR (ASACOL HD) 800 mg DR tablet Take 1,600 mg by mouth three (3) times daily. Yes Provider, Historical   MULTIVITAMIN PO Take 1 Tab by mouth daily. Yes Provider, Historical   cholecalciferol, vitamin D3, (VITAMIN D3) 2,000 unit tab Take 2,000 Units by mouth daily. Yes Provider, Historical   melatonin tab tablet Take 5 mg by mouth nightly.    Yes Provider, Historical   glucose blood VI test strips (TRUE METRIX GLUCOSE TEST STRIP) strip Test blood sugars daily. DX: E11.49 11/10/16  Yes Corinne Christine MD   VIT B6/MAG CIT & OX/POTASS CIT (THERALITH XR PO) Take 2 tablets by mouth two (2) times a day. Yes Provider, Historical   aspirin 81 mg tablet Take 81 mg by mouth nightly. Indications: myocardial infarction prevention   Yes Provider, Historical          Allergies   Allergen Reactions    Apriso [Mesalamine] Diarrhea    Prednisone Other (comments)     constipation    Sulfasalazine Hives              Review of Systems   Constitutional: Negative for chills and fever. Respiratory: Negative for cough and shortness of breath. Cardiovascular: Negative for chest pain and palpitations. Gastrointestinal: Negative for abdominal pain, blood in stool, constipation, diarrhea, heartburn, nausea and vomiting. Genitourinary:        He denies: nocturia, urinary hesitancy, urinary frequency, weak stream.   Musculoskeletal: Negative for joint pain and myalgias. Neurological: Positive for tingling (feet). Negative for focal weakness and headaches. Endo/Heme/Allergies: Does not bruise/bleed easily. Psychiatric/Behavioral: Negative for depression. The patient is not nervous/anxious and does not have insomnia. Objective:   Physical Exam  Constitutional:       General: He is not in acute distress. Appearance: Normal appearance. He is obese. Eyes:      Conjunctiva/sclera: Conjunctivae normal.   Cardiovascular:      Rate and Rhythm: Regular rhythm. Heart sounds: No murmur. Pulmonary:      Effort: Pulmonary effort is normal.      Breath sounds: Normal breath sounds. No decreased breath sounds or wheezing. Abdominal:      General: Bowel sounds are normal.      Palpations: Abdomen is soft. Tenderness: There is no abdominal tenderness. Musculoskeletal:      Right lower leg: No edema. Left lower leg: No edema.    Neurological:      Comments:   Left Foot:   Visual Exam: normal Pulse DP: 2+ (normal)   Filament test: normal sensation   Right Foot:   Visual Exam: normal    Pulse DP: 2+ (normal)   Filament test: normal sensation    Psychiatric:         Mood and Affect: Mood and affect normal.         Behavior: Behavior normal.            Visit Vitals  /68   Pulse 73   Temp 98.1 °F (36.7 °C) (Oral)   Resp 16   Ht 6' (1.829 m)   Wt 224 lb (101.6 kg)   SpO2 97%   BMI 30.38 kg/m²          Advised him to call back or return to office if symptoms worsen/change/persist.  Discussed expected course/resolution/complications of diagnosis in detail with patient. Medication risks/benefits/costs/interactions/alternatives discussed with patient. He was given an after visit summary which includes diagnoses, current medications, & vitals. He expressed understanding with the diagnosis and plan. Aspects of this note may have been generated using voice recognition software. Despite editing, there may be some syntax errors.        Ramón Wakefield MD

## 2020-03-13 NOTE — TELEPHONE ENCOUNTER
Letter sent to patient. All labs are stable or at goal except for worsening DM. He has f/u tomorrow to review.

## 2020-03-28 DIAGNOSIS — E11.42 DIABETIC PERIPHERAL NEUROPATHY (HCC): ICD-10-CM

## 2020-03-29 RX ORDER — SIMVASTATIN 40 MG/1
TABLET, FILM COATED ORAL
Qty: 90 TAB | Refills: 1 | Status: SHIPPED | OUTPATIENT
Start: 2020-03-29 | End: 2020-09-07

## 2020-03-29 RX ORDER — LISINOPRIL 5 MG/1
TABLET ORAL
Qty: 90 TAB | Refills: 1 | Status: SHIPPED | OUTPATIENT
Start: 2020-03-29 | End: 2020-10-04

## 2020-03-31 DIAGNOSIS — I25.10 CORONARY ARTERY DISEASE INVOLVING NATIVE CORONARY ARTERY WITHOUT ANGINA PECTORIS, UNSPECIFIED WHETHER NATIVE OR TRANSPLANTED HEART: ICD-10-CM

## 2020-03-31 DIAGNOSIS — E11.42 DIABETIC PERIPHERAL NEUROPATHY (HCC): ICD-10-CM

## 2020-03-31 DIAGNOSIS — F33.1 MODERATE EPISODE OF RECURRENT MAJOR DEPRESSIVE DISORDER (HCC): ICD-10-CM

## 2020-03-31 RX ORDER — METOPROLOL TARTRATE 25 MG/1
TABLET, FILM COATED ORAL
Qty: 90 TAB | Refills: 1 | Status: SHIPPED | OUTPATIENT
Start: 2020-03-31 | End: 2020-09-08

## 2020-03-31 RX ORDER — GABAPENTIN 100 MG/1
CAPSULE ORAL
Qty: 270 CAP | Refills: 0 | Status: SHIPPED | OUTPATIENT
Start: 2020-03-31 | End: 2020-06-22

## 2020-03-31 RX ORDER — SERTRALINE HYDROCHLORIDE 50 MG/1
TABLET, FILM COATED ORAL
Qty: 135 TAB | Refills: 1 | Status: SHIPPED | OUTPATIENT
Start: 2020-03-31 | End: 2020-09-08

## 2020-04-24 RX ORDER — METFORMIN HYDROCHLORIDE 500 MG/1
TABLET, EXTENDED RELEASE ORAL
Qty: 270 TAB | Refills: 1 | Status: SHIPPED | OUTPATIENT
Start: 2020-04-24 | End: 2020-10-06 | Stop reason: SDUPTHER

## 2020-05-15 ENCOUNTER — ANESTHESIA EVENT (OUTPATIENT)
Dept: ENDOSCOPY | Age: 80
End: 2020-05-15
Payer: MEDICARE

## 2020-05-15 ENCOUNTER — ANESTHESIA (OUTPATIENT)
Dept: ENDOSCOPY | Age: 80
End: 2020-05-15
Payer: MEDICARE

## 2020-05-15 ENCOUNTER — HOSPITAL ENCOUNTER (OUTPATIENT)
Age: 80
Setting detail: OUTPATIENT SURGERY
Discharge: HOME | End: 2020-05-15
Attending: INTERNAL MEDICINE | Admitting: INTERNAL MEDICINE
Payer: MEDICARE

## 2020-05-15 VITALS
RESPIRATION RATE: 14 BRPM | OXYGEN SATURATION: 100 % | BODY MASS INDEX: 28.48 KG/M2 | TEMPERATURE: 98.2 F | WEIGHT: 210 LBS | SYSTOLIC BLOOD PRESSURE: 116 MMHG | DIASTOLIC BLOOD PRESSURE: 70 MMHG | HEART RATE: 64 BPM

## 2020-05-15 PROCEDURE — 74011250636 HC RX REV CODE- 250/636: Performed by: NURSE ANESTHETIST, CERTIFIED REGISTERED

## 2020-05-15 PROCEDURE — 76060000031 HC ANESTHESIA FIRST 0.5 HR: Performed by: INTERNAL MEDICINE

## 2020-05-15 PROCEDURE — 74011250636 HC RX REV CODE- 250/636: Performed by: INTERNAL MEDICINE

## 2020-05-15 PROCEDURE — 76040000019: Performed by: INTERNAL MEDICINE

## 2020-05-15 PROCEDURE — 77030009426 HC FCPS BIOP ENDOSC BSC -B: Performed by: INTERNAL MEDICINE

## 2020-05-15 PROCEDURE — 77030037041 HC FCPS HOT BIOP ENDOSC RAD JAW DISP BSC -B: Performed by: INTERNAL MEDICINE

## 2020-05-15 PROCEDURE — 88305 TISSUE EXAM BY PATHOLOGIST: CPT

## 2020-05-15 PROCEDURE — 74011000250 HC RX REV CODE- 250: Performed by: NURSE ANESTHETIST, CERTIFIED REGISTERED

## 2020-05-15 PROCEDURE — 74011250637 HC RX REV CODE- 250/637: Performed by: INTERNAL MEDICINE

## 2020-05-15 RX ORDER — ATROPINE SULFATE 0.1 MG/ML
0.5 INJECTION INTRAVENOUS
Status: DISCONTINUED | OUTPATIENT
Start: 2020-05-15 | End: 2020-05-15 | Stop reason: HOSPADM

## 2020-05-15 RX ORDER — DEXTROMETHORPHAN/PSEUDOEPHED 2.5-7.5/.8
1.2 DROPS ORAL
Status: DISCONTINUED | OUTPATIENT
Start: 2020-05-15 | End: 2020-05-15 | Stop reason: HOSPADM

## 2020-05-15 RX ORDER — FENTANYL CITRATE 50 UG/ML
100 INJECTION, SOLUTION INTRAMUSCULAR; INTRAVENOUS
Status: DISCONTINUED | OUTPATIENT
Start: 2020-05-15 | End: 2020-05-15 | Stop reason: HOSPADM

## 2020-05-15 RX ORDER — SODIUM CHLORIDE 0.9 % (FLUSH) 0.9 %
5-40 SYRINGE (ML) INJECTION AS NEEDED
Status: DISCONTINUED | OUTPATIENT
Start: 2020-05-15 | End: 2020-05-15 | Stop reason: HOSPADM

## 2020-05-15 RX ORDER — MIDAZOLAM HYDROCHLORIDE 1 MG/ML
.25-1 INJECTION, SOLUTION INTRAMUSCULAR; INTRAVENOUS
Status: DISCONTINUED | OUTPATIENT
Start: 2020-05-15 | End: 2020-05-15 | Stop reason: HOSPADM

## 2020-05-15 RX ORDER — FLUMAZENIL 0.1 MG/ML
0.2 INJECTION INTRAVENOUS
Status: DISCONTINUED | OUTPATIENT
Start: 2020-05-15 | End: 2020-05-15 | Stop reason: HOSPADM

## 2020-05-15 RX ORDER — SODIUM CHLORIDE 9 MG/ML
50 INJECTION, SOLUTION INTRAVENOUS CONTINUOUS
Status: DISCONTINUED | OUTPATIENT
Start: 2020-05-15 | End: 2020-05-15 | Stop reason: HOSPADM

## 2020-05-15 RX ORDER — PROPOFOL 10 MG/ML
INJECTION, EMULSION INTRAVENOUS AS NEEDED
Status: DISCONTINUED | OUTPATIENT
Start: 2020-05-15 | End: 2020-05-15 | Stop reason: HOSPADM

## 2020-05-15 RX ORDER — NALOXONE HYDROCHLORIDE 0.4 MG/ML
0.4 INJECTION, SOLUTION INTRAMUSCULAR; INTRAVENOUS; SUBCUTANEOUS
Status: DISCONTINUED | OUTPATIENT
Start: 2020-05-15 | End: 2020-05-15 | Stop reason: HOSPADM

## 2020-05-15 RX ORDER — SODIUM CHLORIDE 0.9 % (FLUSH) 0.9 %
5-40 SYRINGE (ML) INJECTION EVERY 8 HOURS
Status: DISCONTINUED | OUTPATIENT
Start: 2020-05-15 | End: 2020-05-15 | Stop reason: HOSPADM

## 2020-05-15 RX ORDER — LIDOCAINE HYDROCHLORIDE 20 MG/ML
INJECTION, SOLUTION EPIDURAL; INFILTRATION; INTRACAUDAL; PERINEURAL AS NEEDED
Status: DISCONTINUED | OUTPATIENT
Start: 2020-05-15 | End: 2020-05-15 | Stop reason: HOSPADM

## 2020-05-15 RX ORDER — EPINEPHRINE 0.1 MG/ML
1 INJECTION INTRACARDIAC; INTRAVENOUS
Status: DISCONTINUED | OUTPATIENT
Start: 2020-05-15 | End: 2020-05-15 | Stop reason: HOSPADM

## 2020-05-15 RX ADMIN — PROPOFOL 25 MG: 10 INJECTION, EMULSION INTRAVENOUS at 09:39

## 2020-05-15 RX ADMIN — PROPOFOL 25 MG: 10 INJECTION, EMULSION INTRAVENOUS at 09:27

## 2020-05-15 RX ADMIN — PROPOFOL 50 MG: 10 INJECTION, EMULSION INTRAVENOUS at 09:24

## 2020-05-15 RX ADMIN — PROPOFOL 25 MG: 10 INJECTION, EMULSION INTRAVENOUS at 09:33

## 2020-05-15 RX ADMIN — LIDOCAINE HYDROCHLORIDE 40 MG: 20 INJECTION, SOLUTION EPIDURAL; INFILTRATION; INTRACAUDAL; PERINEURAL at 09:19

## 2020-05-15 RX ADMIN — PROPOFOL 50 MG: 10 INJECTION, EMULSION INTRAVENOUS at 09:19

## 2020-05-15 RX ADMIN — PROPOFOL 25 MG: 10 INJECTION, EMULSION INTRAVENOUS at 09:36

## 2020-05-15 RX ADMIN — SODIUM CHLORIDE: 900 INJECTION, SOLUTION INTRAVENOUS at 09:13

## 2020-05-15 RX ADMIN — PROPOFOL 25 MG: 10 INJECTION, EMULSION INTRAVENOUS at 09:30

## 2020-05-15 NOTE — PROGRESS NOTES

## 2020-05-15 NOTE — PROCEDURES
[facility].  [address] [facility], [address]  [phone]                              Colonoscopy Procedure Note      Indications:  Ulcerative colitis (long standing), last colonoscopy 2018     :  Omar Oswald MD    Referring Provider: Kenton Ceballos MD    Sedation:  MAC anesthesia    Procedure Details:  After informed consent was obtained with all risks and benefits of procedure explained and preoperative exam completed, the patient was taken to the endoscopy suite and placed in the left lateral decubitus position. Upon sequential sedation as per above, a digital rectal exam was performed per below. The Olympus videocolonoscope was inserted in the rectum and carefully advanced to the terminal ileum. The quality of preparation was fair. [place] Bowel Prep Score : 2/2/2. The colonoscope was slowly withdrawn with careful evaluation between folds. Retroflexion in the rectum was performed. Findings:   Rectum: normal  Sigmoid: mild colitis with patchy erythema, biopsied, few mild diverticula  Descending Colon: mild colitis with patchy erythema, biopsied  Transverse Colon: mild colitis with patchy erythema, biopsied  Ascending Colon: small amount of pill debris, mild colitis with patchy erythema, biopsied  Cecum:  small amount of pill debris, mild colitis  Terminal Ileum: normal    Interventions:  biopsies    Specimen Removed:    ID Type Source Tests Collected by Time Destination   1 : Right Colon Preservative   Rashid Mackey MD 5/15/2020 8257 Pathology   2 : Transverse Colon Preservative   Rashid Mackey MD 5/15/2020 0932 Pathology   3 : Left Colon Pressaimaative   Rashid Mackey MD 5/15/2020 1407 Pathology       Complications: None. EBL:  Minimal    Impression:    See Postoperative diagnosis above    Recommendations:   - Await pathology. You should receive a letter within 2 weeks.    - Resume normal medications.  - Recommend repeat colonoscopy in 2 years. Discharge Disposition:  Home in the company of a  when able to ambulate.     Vicki Diallo MD  5/15/2020  9:49 AM

## 2020-05-15 NOTE — DISCHARGE INSTRUCTIONS
[address] [facility].  [address]                                  Fernanda Cheema  [id number]  1940    It was my pleasure seeing you for your procedure. You will also receive a summary report with the findings from this procedure and any further recommendations. If you had polyps removed or biopsies taken during your procedure, you will receive a separate letter from me within the next 2 weeks. If you don't receive this letter or if you have any questions, please call my office [phone]. Please take note of the post procedure instructions listed below. Dr. Lam Barbosa    These instructions give you information on caring for yourself after your procedure. Call your doctor if you have any problems or questions after your procedure. HOME CARE  · Walk if you have belly cramping or gas. Walking will help get rid of the air and reduce the bloated feeling in your belly (abdomen). · Your IV site (where you received drugs) may be tender to touch. Place warm towels on the site; keep your arm up on two pillows if you have any swelling or soreness in the area. · You may shower. ACTIVITY:  · Take frequent rest periods and move at a slower pace for the next 24 hours. .  · You may resume your regular activity tomorrow if you are feeling back to normal.  · Do not drive or ride a bicycle for at least 24 hours (because of the medicine (anesthesia) used during the test). · Do not sign any important legal documents or use or operate any machinery for 24 hours  · Do not take sleeping medicines/nerve drugs for 24 hours unless the doctor tells you. · You can return to work/school tomorrow unless otherwise instructed. NUTRITION:  · Drink plenty of fluids to keep your pee (urine) clear or pale yellow  · Begin with a light meal and progress to your normal diet.  Heavy or fried foods are harder to digest and may make you feel sick to your stomach (nauseated). · Once you are feeling back to normal, you may resume your normal diet as instructed by your doctor. · Avoid alcoholic beverages for 24 hours or as instructed. IF YOU HAD BIOPSIES TAKEN OR POLYPS REMOVED DURING THE PROCEDURE:  · For the next 7 days, avoid all non-steroidal antiinflammatory medications such as Ibuprofen, Motrin, Advil, Alleve, Renetta-seltzer, Goody's powder, BC powder. · If you do not have an heart condition that requires you to take a daily aspirin, you should avoid taking aspirin for 7 days. · Eat a soft diet for 24 hours. · Monitor your stools for any blood or dark black, tar-like, stools as this may be a sign of bleeding and if you see any blood, notify your doctor immediately. GET HELP RIGHT AWAY AND SEEK IMMEDIATE MEDICAL CARE IF:  · You have more than a spotting of blood in your stool. · You pass clumps of tissue (blood clots) or fill the toilet with blood. · Your belly is painfully swollen or puffy (abdominal distention). · You throw up (vomit). · You have a fever. · You have redness, pain or swelling at the IV site that last greater than two days. · You have abdominal pain or discomfort that is severe or gets worse throughout the day. Post-procedure diagnosis:    Diverticula  mild colitis    Post-procedure recommendations:  Findings:   Rectum: normal  Sigmoid: mild colitis with patchy erythema, biopsied, few mild diverticula  Descending Colon: mild colitis with patchy erythema, biopsied  Transverse Colon: mild colitis with patchy erythema, biopsied  Ascending Colon: small amount of pill debris, mild colitis with patchy erythema, biopsied  Cecum:  small amount of pill debris, mild colitis  Terminal Ileum: normal      Recommendations:   - Await pathology. You should receive a letter within 2 weeks. - Resume normal medications.  - Recommend repeat colonoscopy in 2 years.      Advance Care Planning  People with COVID-19 may have no symptoms, mild symptoms, such as fever, cough, and shortness of breath or they may have more severe illness, developing severe and fatal pneumonia. As a result, Advance Care Planning with attention to naming a health care decision maker (someone you trust to make healthcare decisions for you if you could not speak for yourself) and sharing other health care preferences is important BEFORE a possible health crisis. Please contact your Primary Care Provider to discuss Advance Care Planning. Preventing the Spread of Coronavirus Disease 2019 in Homes and Residential Communities  For the most recent information go to [email or web address]    Prevention steps for People with confirmed or suspected COVID-19 (including persons under investigation) who do not need to be hospitalized  and   People with confirmed COVID-19 who were hospitalized and determined to be medically stable to go home    Your healthcare provider and public health staff will evaluate whether you can be cared for at home. If it is determined that you do not need to be hospitalized and can be isolated at home, you will be monitored by staff from your local or state health department. You should follow the prevention steps below until a healthcare provider or local or state health department says you can return to your normal activities. Stay home except to get medical care  People who are mildly ill with COVID-19 are able to isolate at home during their illness. You should restrict activities outside your home, except for getting medical care. Do not go to work, school, or public areas. Avoid using public transportation, ride-sharing, or taxis. Separate yourself from other people and animals in your home  People: As much as possible, you should stay in a specific room and away from other people in your home. Also, you should use a separate bathroom, if available. Animals:  You should restrict contact with pets and other animals while you are sick with COVID-19, just like you would around other people. Although there have not been reports of pets or other animals becoming sick with COVID-19, it is still recommended that people sick with COVID-19 limit contact with animals until more information is known about the virus. When possible, have another member of your household care for your animals while you are sick. If you are sick with COVID-19, avoid contact with your pet, including petting, snuggling, being kissed or licked, and sharing food. If you must care for your pet or be around animals while you are sick, wash your hands before and after you interact with pets and wear a facemask. Call ahead before visiting your doctor  If you have a medical appointment, call the healthcare provider and tell them that you have or may have COVID-19. This will help the healthcare providers office take steps to keep other people from getting infected or exposed. Wear a facemask  You should wear a facemask when you are around other people (e.g., sharing a room or vehicle) or pets and before you enter a healthcare providers office. If you are not able to wear a facemask (for example, because it causes trouble breathing), then people who live with you should not stay in the same room with you, or they should wear a facemask if they enter your room. Cover your coughs and sneezes  Cover your mouth and nose with a tissue when you cough or sneeze. Throw used tissues in a lined trash can. Immediately wash your hands with soap and water for at least 20 seconds or, if soap and water are not available, clean your hands with an alcohol-based hand  that contains at least 60% alcohol. Clean your hands often  Wash your hands often with soap and water for at least 20 seconds, especially after blowing your nose, coughing, or sneezing; going to the bathroom; and before eating or preparing food.  If soap and water are not readily available, use an alcohol-based hand  with at least 60% alcohol, covering all surfaces of your hands and rubbing them together until they feel dry. Soap and water are the best option if hands are visibly dirty. Avoid touching your eyes, nose, and mouth with unwashed hands. Avoid sharing personal household items  You should not share dishes, drinking glasses, cups, eating utensils, towels, or bedding with other people or pets in your home. After using these items, they should be washed thoroughly with soap and water. Clean all high-touch surfaces everyday  High touch surfaces include counters, tabletops, doorknobs, bathroom fixtures, toilets, phones, keyboards, tablets, and bedside tables. Also, clean any surfaces that may have blood, stool, or body fluids on them. Use a household cleaning spray or wipe, according to the label instructions. Labels contain instructions for safe and effective use of the cleaning product including precautions you should take when applying the product, such as wearing gloves and making sure you have good ventilation during use of the product. Monitor your symptoms  Seek prompt medical attention if your illness is worsening (e.g., difficulty breathing). Before seeking care, call your healthcare provider and tell them that you have, or are being evaluated for, COVID-19. Put on a facemask before you enter the facility. These steps will help the healthcare providers office to keep other people in the office or waiting room from getting infected or exposed. Ask your healthcare provider to call the local or state health department. Persons who are placed under active monitoring or facilitated self-monitoring should follow instructions provided by their local health department or occupational health professionals, as appropriate. When working with your local health department check their available hours.   If you have a medical emergency and need to call 911, notify the dispatch personnel that you have, or are being evaluated for COVID-19. If possible, put on a facemask before emergency medical services arrive. Discontinuing home isolation  Patients with confirmed COVID-19 should remain under home isolation precautions until the risk of secondary transmission to others is thought to be low. The decision to discontinue home isolation precautions should be made on a case-by-case basis, in consultation with healthcare providers and state and local health departments. Learning About Coronavirus [phone])  Coronavirus [phone]): Overview  What is coronavirus (COVID-19)? The coronavirus disease (COVID-19) is caused by a virus. It is an illness that was first found in [place], [place], in December 2019. It has since spread worldwide. The virus can cause fever, cough, and trouble breathing. In severe cases, it can cause pneumonia and make it hard to breathe without help. It can cause death. Coronaviruses are a large group of viruses. They cause the common cold. They also cause more serious illnesses like Middle East respiratory syndrome (MERS) and severe acute respiratory syndrome (SARS). COVID-19 is caused by a novel coronavirus. That means it's a new type that has not been seen in people before. This virus spreads person-to-person through droplets from coughing and sneezing. It can also spread when you are close to someone who is infected. And it can spread when you touch something that has the virus on it, such as a doorknob or a tabletop. What can you do to protect yourself from coronavirus (COVID-19)? The best way to protect yourself from getting sick is to:  · Avoid areas where there is an outbreak. · Avoid contact with people who may be infected. · Wash your hands often with soap or alcohol-based hand sanitizers. · Avoid crowds and try to stay at least 6 feet away from other people. · Wash your hands often, especially after you cough or sneeze.  Use soap and water, and scrub for at least 20 seconds. If soap and water aren't available, use an alcohol-based hand . · Avoid touching your mouth, nose, and eyes. What can you do to avoid spreading the virus to others? To help avoid spreading the virus to others:  · Cover your mouth with a tissue when you cough or sneeze. Then throw the tissue in the trash. · Use a disinfectant to clean things that you touch often. · Stay home if you are sick or have been exposed to the virus. Don't go to school, work, or public areas. And don't use public transportation. · If you are sick:  ? Leave your home only if you need to get medical care. But call the doctor's office first so they know you're coming. And wear a face mask, if you have one.  ? If you have a face mask, wear it whenever you're around other people. It can help stop the spread of the virus when you cough or sneeze. ? Clean and disinfect your home every day. Use household  and disinfectant wipes or sprays. Take special care to clean things that you grab with your hands. These include doorknobs, remote controls, phones, and handles on your refrigerator and microwave. And don't forget countertops, tabletops, bathrooms, and computer keyboards. When to call for help  Call 911 anytime you think you may need emergency care. For example, call if:  · You have severe trouble breathing. (You can't talk at all.)  · You have constant chest pain or pressure. · You are severely dizzy or lightheaded. · You are confused or can't think clearly. · Your face and lips have a blue color. · You pass out (lose consciousness) or are very hard to wake up. Call your doctor now if you develop symptoms such as:  · Shortness of breath. · Fever. · Cough. If you need to get care, call ahead to the doctor's office for instructions before you go. Make sure you wear a face mask, if you have one, to prevent exposing other people to the virus.   Where can you get the latest information? The following health organizations are tracking and studying this virus. Their websites contain the most up-to-date information. Johnson Kohli also learn what to do if you think you may have been exposed to the virus. · U.S. Centers for Disease Control and Prevention (CDC): The CDC provides updated news about the disease and travel advice. The website also tells you how to prevent the spread of infection. www.cdc.gov  · World Health Organization [facility]): WHO offers information about the virus outbreaks. WHO also has travel advice. www.who.int  Current as of: April 1, 2020               Content Version: 12.4  © 2006-2020 Healthwise[employer]orporated. Care instructions adapted under license by your healthcare professional. If you have questions about a medical condition or this instruction, always ask your healthcare professional. [address] any warranty or liability for your use of this information.

## 2020-05-15 NOTE — H&P
[address]  [address] [place], [address]  [phone]                                History and Physical     NAME: Jerry Story   :  1940   MRN:  [id number]     HPI:  The patient was seen and examined. Past Surgical History:   Procedure Laterality Date    COLONOSCOPY N/A 2017    active colitis on biopsy.   Performed by Kaz Marino MD at [facility] ENDOSCOPY    COLONOSCOPY Left 2018    active colitis - performed by Kaz Marino MD at [facility]. [address], COLON, DIAGNOSTIC  6/3/14    Dr. Diego Reyes recommended q2 surveil    HX CATARACT REMOVAL  ,     both eyes, laser repair to left eye in     HX COLONOSCOPY  06/15/2016    path only- crypt destructive colitis/multiple colonoscopies    HX CORONARY ARTERY BYPASS GRAFT      2 vessel    HX HEART CATHETERIZATION      HX HERNIA REPAIR      Abdominal Hernia    HX OTHER SURGICAL  11/11/15    advance cystoscopy    HX TONSILLECTOMY      HX TURP      HX UROLOGICAL      TUNA procedure    HX UROLOGICAL  12    cystoscopy to remove kidney stone x3    LITHOTRIPSY       Past Medical History:   Diagnosis Date    BPH (benign prostatic hyperplasia)     improved after TURP    CAD (coronary artery disease)     MI, PCI/stent to mid CX 02, CABG x2 on 02 (LIMA to LAD & SVG to CX)    Calculus of kidney     due to pelvic kidney    Chronic back pain     scoliosis & OA    Depression     Diabetes ([address]     Hypertension     CARROL (obstructive sleep apnea) 6/10/2016    uses CPAP    Other and unspecified hyperlipidemia     [address] (ulcerative colitis) [address] [address]      Social History     Tobacco Use    Smoking status: Former Smoker     Last attempt to quit: 1990     Years since quittin.3    Smokeless tobacco: Never Used   Substance Use Topics    Alcohol use: No     Frequency: Never     Binge frequency: Never     Comment: none    Drug use: No     Allergies   Allergen Reactions    Apriso [Mesalamine] Diarrhea    Prednisone Other (comments)     constipation    Sulfasalazine Hives     Family History   Problem Relation Age of Onset    Heart Disease Mother     Heart Disease Father     Diabetes Brother     Heart Disease Brother     Arthritis-osteo Brother         back - stenosis    Heart Disease Other         Coronary Artery Disease    Ulcerative Colitis Sister     Heart Disease Sister     Cancer Brother         intestinal     No current facility-administered medications for this encounter. Current Outpatient Medications   Medication Sig    metFORMIN ER (GLUCOPHAGE XR) 500 mg tablet TAKE 1 TABLET BY MOUTH EVERY MORNING & TAKE 2 TABLETS BY MOUTH DAILY IN THE EVENING    gabapentin (NEURONTIN) 100 mg capsule TAKE 1 CAPSULE BY MOUTH THREE TIMES DAILY    sertraline (ZOLOFT) 50 mg tablet TAKE 1 AND 1/2 TABLETS BY MOUTH DAILY    metoprolol tartrate (LOPRESSOR) 25 mg tablet TAKE 1/2 TABLET BY MOUTH TWICE DAILY    lisinopriL (PRINIVIL, ZESTRIL) 5 mg tablet TAKE 1 TABLET BY MOUTH EVERY DAY    simvastatin (ZOCOR) 40 mg tablet NO REFILLS TAKE 1 TABLET BY MOUTH AT BEDTIME    empagliflozin (JARDIANCE) 10 mg tablet Take 1 Tab by mouth daily.  glucose blood VI test strips (True Metrix Glucose Test Strip) strip Test blood sugars daily. DX: E11.40, E11.65    amoxicillin-clavulanate (AUGMENTIN) 875-125 mg per tablet Take 1 Tab by mouth every twelve (12) hours.  dicyclomine (BENTYL) 10 mg capsule Take 1 Cap by mouth three (3) times daily as needed for Pain or Diarrhea.  lancets misc Use daily as directed DX: E11.49    Blood-Glucose Meter (TRUE METRIX GLUCOSE METER) misc Use to test blood sugars daily. DX:  E11.49    mesalamine DR (ASACOL HD) 800 mg DR tablet Take 1,600 mg by mouth three (3) times daily.  MULTIVITAMIN PO Take 1 Tab by mouth daily.  cholecalciferol, vitamin D3, (VITAMIN D3) 2,000 unit tab Take 2,000 Units by mouth daily.     melatonin tab tablet Take 5 mg by mouth nightly.  VIT B6/MAG CIT & OX/POTASS CIT (THERALITH XR PO) Take 2 tablets by mouth two (2) times a day.  aspirin 81 mg tablet Take 81 mg by mouth nightly. Indications: myocardial infarction prevention         PHYSICAL EXAM:  General: WD, WN. Alert, cooperative, no acute distress    HEENT: NC, Atraumatic. PERRLA, EOMI. Anicteric sclerae. Lungs:  CTA Bilaterally. No Wheezing/Rhonchi/Rales. Heart:  Regular  rhythm,  No murmur, No Rubs, No Gallops  Abdomen: Soft, Non distended, Non tender. +Bowel sounds, no HSM  Extremities: No c/c/e  Neurologic:  CN 2-12 gi, Alert and oriented X 3. No acute neurological distress   Psych:   Good insight. Not anxious nor agitated. The heart, lungs and mental status were satisfactory for the administration of MAC sedation and for the procedure. Mallampati score: 2     The patient was counseled at length about the risks of adamaris Covid-19 in the dilip-operative and post-operative states including the recovery window of their procedure. The patient was made aware that adamaris Covid-19 after a surgical procedure may worsen their prognosis for recovering from the virus and lend to a higher morbidity and or mortality risk. The patient was given the options of postponing their procedure. All of the risks, benefits, and alternatives were discussed. The patient does wish to proceed with the procedure.       Assessment:   · Ulcerative colitis, diarrhea    Plan:   · Endoscopic procedure :colonoscopy  · MAC sedation

## 2020-05-15 NOTE — ANESTHESIA PREPROCEDURE EVALUATION
Relevant Problems   No relevant active problems       Anesthetic History               Review of Systems / Medical History  Patient summary reviewed, nursing notes reviewed and pertinent labs reviewed    Pulmonary        Sleep apnea: BiPAP           Neuro/Psych         Psychiatric history     Cardiovascular    Hypertension: well controlled          CAD and cardiac stents    Exercise tolerance: >4 METS     GI/Hepatic/Renal                Endo/Other    Diabetes         Other Findings              Physical Exam    Airway  Mallampati: II  TM Distance: > 6 cm  Neck ROM: normal range of motion   Mouth opening: Normal     Cardiovascular    Rhythm: regular  Rate: normal         Dental  No notable dental hx       Pulmonary  Breath sounds clear to auscultation               Abdominal         Other Findings            Anesthetic Plan    ASA: 3  Anesthesia type: MAC          Induction: Intravenous  Anesthetic plan and risks discussed with: Patient

## 2020-05-18 RX ORDER — EMPAGLIFLOZIN 10 MG/1
TABLET, FILM COATED ORAL
Qty: 90 TAB | Refills: 0 | Status: SHIPPED | OUTPATIENT
Start: 2020-05-18 | End: 2020-08-24 | Stop reason: SDUPTHER

## 2020-05-26 ENCOUNTER — TELEPHONE (OUTPATIENT)
Dept: INTERNAL MEDICINE CLINIC | Age: 80
End: 2020-05-26

## 2020-05-26 NOTE — TELEPHONE ENCOUNTER
Call to wife, on HIPAA, advised of Dr. Helen Mcdonald' note, verbalized understanding. Transferred to appointment desk to schedule lab draw.

## 2020-05-26 NOTE — TELEPHONE ENCOUNTER
Call from patient's wife, on HIPAA.  has appointment 06/15/20 with Dr. Vamshi Leigh, still scheduled as routine care, video through 06/12/20 for now. Asking for lab slip for that appointment.

## 2020-05-26 NOTE — TELEPHONE ENCOUNTER
Lab slip is ready. Notify patient depending on situation may be converted to virtual visit. Will update him 1 week prior if we change in. Should be fasting.

## 2020-06-10 ENCOUNTER — HOSPITAL ENCOUNTER (OUTPATIENT)
Dept: LAB | Age: 80
Discharge: HOME | End: 2020-06-10
Payer: MEDICARE

## 2020-06-10 ENCOUNTER — CLINICAL SUPPORT (OUTPATIENT)
Dept: INTERNAL MEDICINE CLINIC | Age: 80
End: 2020-06-10

## 2020-06-10 VITALS — TEMPERATURE: 96.6 F

## 2020-06-10 DIAGNOSIS — N20.0 KIDNEY STONE: Primary | ICD-10-CM

## 2020-06-10 PROCEDURE — 83036 HEMOGLOBIN GLYCOSYLATED A1C: CPT

## 2020-06-10 PROCEDURE — 36415 COLL VENOUS BLD VENIPUNCTURE: CPT

## 2020-06-10 PROCEDURE — 80053 COMPREHEN METABOLIC PANEL: CPT

## 2020-06-11 LAB
ALBUMIN SERPL-MCNC: 4.8 G/DL (ref 3.7–4.7)
ALBUMIN/GLOB SERPL: 2 {RATIO} (ref 1.2–2.2)
ALP SERPL-CCNC: 73 IU/L (ref 39–117)
ALT SERPL-CCNC: 28 IU/L (ref 0–44)
AST SERPL-CCNC: 31 IU/L (ref 0–40)
BILIRUB SERPL-MCNC: 0.6 MG/DL (ref 0–1.2)
BUN SERPL-MCNC: 12 MG/DL (ref 8–27)
BUN/CREAT SERPL: 13 (ref 10–24)
CALCIUM SERPL-MCNC: 9.8 MG/DL (ref 8.6–10.2)
CHLORIDE SERPL-SCNC: 102 MMOL/L (ref 96–106)
CO2 SERPL-SCNC: 20 MMOL/L (ref 20–29)
CREAT SERPL-MCNC: 0.94 MG/DL (ref 0.76–1.27)
EST. AVERAGE GLUCOSE BLD GHB EST-MCNC: 148 MG/DL
GLOBULIN SER CALC-MCNC: 2.4 G/DL (ref 1.5–4.5)
GLUCOSE SERPL-MCNC: 125 MG/DL (ref 65–99)
HBA1C MFR BLD: 6.8 % (ref 4.8–5.6)
POTASSIUM SERPL-SCNC: 4.7 MMOL/L (ref 3.5–5.2)
PROT SERPL-MCNC: 7.2 G/DL (ref 6–8.5)
SODIUM SERPL-SCNC: 141 MMOL/L (ref 134–144)

## 2020-06-11 NOTE — TELEPHONE ENCOUNTER
Letter sent to patient. All labs are stable or at goal for him. DM improved. Has f/u on 6/15 to review.

## 2020-06-12 ENCOUNTER — TELEPHONE (OUTPATIENT)
Dept: INTERNAL MEDICINE CLINIC | Age: 80
End: 2020-06-12

## 2020-06-15 ENCOUNTER — OFFICE VISIT (OUTPATIENT)
Dept: INTERNAL MEDICINE CLINIC | Age: 80
End: 2020-06-15

## 2020-06-15 VITALS
HEIGHT: 72 IN | WEIGHT: 218.8 LBS | OXYGEN SATURATION: 98 % | RESPIRATION RATE: 16 BRPM | HEART RATE: 64 BPM | DIASTOLIC BLOOD PRESSURE: 58 MMHG | SYSTOLIC BLOOD PRESSURE: 110 MMHG | BODY MASS INDEX: 29.64 KG/M2 | TEMPERATURE: 97.5 F

## 2020-06-15 DIAGNOSIS — Z71.89 EDUCATED ABOUT COVID-19 VIRUS INFECTION: ICD-10-CM

## 2020-06-15 DIAGNOSIS — E11.42 CONTROLLED TYPE 2 DIABETES MELLITUS WITH DIABETIC POLYNEUROPATHY, WITHOUT LONG-TERM CURRENT USE OF INSULIN (HCC): Primary | ICD-10-CM

## 2020-06-15 RX ORDER — BUDESONIDE 3 MG/1
CAPSULE, COATED PELLETS ORAL
COMMUNITY
Start: 2020-06-08 | End: 2020-09-14

## 2020-06-15 RX ORDER — ZOSTER VACCINE RECOMBINANT, ADJUVANTED 50 MCG/0.5
KIT INTRAMUSCULAR
Qty: 0.5 ML | Refills: 1 | Status: CANCELLED | OUTPATIENT
Start: 2020-06-15

## 2020-06-15 NOTE — PROGRESS NOTES
Hemanth Lechuga is a 67 y.o. male who was seen in clinic today (6/15/2020). Assessment & Plan:     Diagnoses and all orders for this visit:    1. Controlled type 2 diabetes mellitus with diabetic polyneuropathy, without long-term current use of insulin ([address]- improved and at goal, continue current treatment, encouraged to continue w/ diet changes and weight loss    2. Educated about COVID-19 virus infection- questions answered, reviewed limitations w/ testing and re-enforced social distancing guidelines and mask usage. Follow-up and Dispositions    · Return in about 3 months (around 9/15/2020) for Regular follow up. Subjective:   Jenn Sen was seen today for Diabetes    Since last visit had colonoscopy, medications adjusted. He reports this has helped and diarrhea has improved slightly. Endocrine Review  He is seen for diabetes. Since last visit he reports: started on Jardiance and weight is down slightly. He reports medication compliance: compliant all of the time. Medication side effects: none. Diabetic diet compliance: compliant most of the time. He has decreased junk food but not cut out completely. Lab review: labs reviewed and discussed with patient. Brief Labs:     Lab Results   Component Value Date/Time    Sodium 141 06/10/2020 09:35 AM    Potassium 4.7 06/10/2020 09:35 AM    Creatinine 0.94 06/10/2020 09:35 AM    Cholesterol, total 134 03/11/2020 08:13 AM    HDL Cholesterol 47 03/11/2020 08:13 AM    LDL, calculated 61 03/11/2020 08:13 AM    Triglyceride 129 03/11/2020 08:13 AM    Hemoglobin A1c 6.8 06/10/2020 09:35 AM          Prior to Admission medications    Medication Sig Start Date End Date Taking?  Authorizing Provider   budesonide (ENTOCORT EC) 3 mg capsule TAKE 3 CAPSULES BY MOUTH EVERY DAY FOR 8 WEEKS 6/8/20  Yes Provider, Historical   Jardiance 10 mg tablet TAKE 1 TABLET BY MOUTH EVERY DAY 5/18/20  Yes Amari Noriega MD   metFORMIN ER (GLUCOPHAGE XR) 500 mg tablet TAKE 1 TABLET BY MOUTH EVERY MORNING & TAKE 2 TABLETS BY MOUTH DAILY IN THE EVENING 4/24/20  Yes Colleen Oconnor MD   gabapentin (NEURONTIN) 100 mg capsule TAKE 1 CAPSULE BY MOUTH THREE TIMES DAILY 3/31/20  Yes Oren Hobbs NP   sertraline (ZOLOFT) 50 mg tablet TAKE 1 AND 1/2 TABLETS BY MOUTH DAILY 3/31/20  Yes Oren Hobbs NP   metoprolol tartrate (LOPRESSOR) 25 mg tablet TAKE 1/2 TABLET BY MOUTH TWICE DAILY 3/31/20  Yes Orne Hobbs NP   lisinopriL (PRINIVIL, ZESTRIL) 5 mg tablet TAKE 1 TABLET BY MOUTH EVERY DAY 3/29/20  Yes Colleen Oconnor MD   simvastatin (ZOCOR) 40 mg tablet NO REFILLS TAKE 1 TABLET BY MOUTH AT BEDTIME 3/29/20  Yes Colleen Oconnor MD   glucose blood VI test strips (True Metrix Glucose Test Strip) strip Test blood sugars daily. DX: E11.40, E11.65 3/13/20  Yes Colleen Oconnor MD   lancets misc Use daily as directed DX: E11.49 3/14/19  Yes Colleen Oconnor MD   Blood-Glucose Meter (TRUE METRIX GLUCOSE METER) misc Use to test blood sugars daily. DX:  E11.49 3/13/19  Yes Colleen Oconnor MD   mesalamine DR (ASACOL HD) 800 mg DR tablet Take 1,600 mg by mouth three (3) times daily. Yes Provider, Historical   MULTIVITAMIN PO Take 1 Tab by mouth daily. Yes Provider, Historical   cholecalciferol, vitamin D3, (VITAMIN D3) 2,000 unit tab Take 2,000 Units by mouth daily. Yes Provider, Historical   melatonin tab tablet Take 5 mg by mouth nightly. Yes Provider, Historical   VIT B6/MAG CIT & OX/POTASS CIT (THERALITH XR PO) Take 2 tablets by mouth two (2) times a day. Yes Provider, Historical   aspirin 81 mg tablet Take 81 mg by mouth nightly. Indications: myocardial infarction prevention   Yes Provider, Historical   amoxicillin-clavulanate (AUGMENTIN) 875-125 mg per tablet Take 1 Tab by mouth every twelve (12) hours.  3/6/20 6/15/20  Colleen Oconnor MD   dicyclomine (BENTYL) 10 mg capsule Take 1 Cap by mouth three (3) times daily as needed for Pain or Diarrhea. 9/17/19   Linda Fung MD          Allergies   Allergen Reactions   Autumn Olvera [Mesalamine] Diarrhea     Per patient - not allergic    Prednisone Other (comments)     Constipation - per patient not allergic    Sulfasalazine Hives           Review of Systems   Constitutional: Positive for malaise/fatigue. Respiratory: Negative for cough and shortness of breath. Cardiovascular: Negative for chest pain and palpitations. Gastrointestinal: Negative for abdominal pain, constipation, diarrhea, nausea and vomiting. Objective:   Physical Exam  Constitutional:       Appearance: He is obese. Cardiovascular:      Rate and Rhythm: Regular rhythm. Heart sounds: Normal heart sounds. No murmur. Pulmonary:      Effort: Pulmonary effort is normal.      Breath sounds: Normal breath sounds. No wheezing or rales. Abdominal:      General: Bowel sounds are normal.      Palpations: Abdomen is soft. There is no mass. Tenderness: There is no abdominal tenderness. Neurological:      Comments:   Left Foot:   Visual Exam: normal    Pulse DP: 1+ (weak)   Filament test: normal sensation   Right Foot:   Visual Exam: normal    Pulse DP: 1+ (weak)   Filament test: normal sensation    Psychiatric:         Behavior: Behavior normal.           Visit Vitals  /58   Pulse 64   Temp 97.5 °F (36.4 °C) (Temporal)   Resp 16   Ht 6' (1.829 m)   Wt 218 lb 12.8 oz (99.2 kg)   SpO2 98%   BMI 29.67 kg/m²         Disclaimer:  Advised him to call back or return to office if symptoms worsen/change/persist.  Discussed expected course/resolution/complications of diagnosis in detail with patient. Medication risks/benefits/costs/interactions/alternatives discussed with patient. He was given an after visit summary which includes diagnoses, current medications, & vitals. He expressed understanding with the diagnosis and plan.       Aspects of this note may have been generated using voice recognition software. Despite editing, there may be some syntax errors.        Kristadayday Wadsworth MD

## 2020-06-22 DIAGNOSIS — E11.42 DIABETIC PERIPHERAL NEUROPATHY (HCC): ICD-10-CM

## 2020-06-23 RX ORDER — GABAPENTIN 100 MG/1
CAPSULE ORAL
Qty: 270 CAP | Refills: 0 | Status: SHIPPED | OUTPATIENT
Start: 2020-06-23 | End: 2020-12-18

## 2020-06-23 NOTE — TELEPHONE ENCOUNTER
Chart & [facility]  reviewed. Last filled on 5/23. Previously 1 month, will send in 3 months. Last PDMP Dene Omer as Reviewed: 
Review User Review Instant Review Result YIMI LUJAN 6/23/2020  9:05 AM Reviewed PDMP [1]

## 2020-08-24 NOTE — TELEPHONE ENCOUNTER
Requested Prescriptions     Pending Prescriptions Disp Refills    empagliflozin (Jardiance) 10 mg tablet 90 Tab [address] [place] [address]  [phone]

## 2020-09-05 DIAGNOSIS — I25.10 CORONARY ARTERY DISEASE INVOLVING NATIVE CORONARY ARTERY WITHOUT ANGINA PECTORIS, UNSPECIFIED WHETHER NATIVE OR TRANSPLANTED HEART: ICD-10-CM

## 2020-09-05 DIAGNOSIS — F33.1 MODERATE EPISODE OF RECURRENT MAJOR DEPRESSIVE DISORDER (HCC): ICD-10-CM

## 2020-09-05 DIAGNOSIS — E11.42 DIABETIC PERIPHERAL NEUROPATHY (HCC): ICD-10-CM

## 2020-09-07 RX ORDER — SIMVASTATIN 40 MG/1
TABLET, FILM COATED ORAL
Qty: 90 TAB | Refills: 1 | Status: SHIPPED | OUTPATIENT
Start: 2020-09-07 | End: 2021-03-15 | Stop reason: SDUPTHER

## 2020-09-08 RX ORDER — METOPROLOL TARTRATE 25 MG/1
TABLET, FILM COATED ORAL
Qty: 90 TAB | Refills: 1 | Status: SHIPPED | OUTPATIENT
Start: 2020-09-08 | End: 2021-02-19 | Stop reason: SDUPTHER

## 2020-09-08 RX ORDER — SERTRALINE HYDROCHLORIDE 50 MG/1
TABLET, FILM COATED ORAL
Qty: 135 TAB | Refills: 1 | Status: SHIPPED | OUTPATIENT
Start: 2020-09-08 | End: 2021-03-15 | Stop reason: SDUPTHER

## 2020-09-14 ENCOUNTER — TELEPHONE (OUTPATIENT)
Dept: INTERNAL MEDICINE CLINIC | Age: 80
End: 2020-09-14

## 2020-09-14 ENCOUNTER — OFFICE VISIT (OUTPATIENT)
Dept: INTERNAL MEDICINE CLINIC | Age: 80
End: 2020-09-14
Payer: MEDICARE

## 2020-09-14 VITALS
HEART RATE: 70 BPM | DIASTOLIC BLOOD PRESSURE: 70 MMHG | TEMPERATURE: 98.2 F | HEIGHT: 72 IN | OXYGEN SATURATION: 98 % | WEIGHT: 221 LBS | SYSTOLIC BLOOD PRESSURE: 120 MMHG | BODY MASS INDEX: 29.93 KG/M2 | RESPIRATION RATE: 16 BRPM

## 2020-09-14 DIAGNOSIS — I25.10 CORONARY ARTERY DISEASE INVOLVING NATIVE CORONARY ARTERY OF NATIVE HEART WITHOUT ANGINA PECTORIS: ICD-10-CM

## 2020-09-14 DIAGNOSIS — F33.42 RECURRENT MAJOR DEPRESSIVE DISORDER, IN FULL REMISSION (HCC): ICD-10-CM

## 2020-09-14 DIAGNOSIS — E78.00 PURE HYPERCHOLESTEROLEMIA: ICD-10-CM

## 2020-09-14 DIAGNOSIS — E11.42 CONTROLLED TYPE 2 DIABETES MELLITUS WITH DIABETIC POLYNEUROPATHY, WITHOUT LONG-TERM CURRENT USE OF INSULIN (HCC): Primary | ICD-10-CM

## 2020-09-14 DIAGNOSIS — I10 ESSENTIAL HYPERTENSION, BENIGN: ICD-10-CM

## 2020-09-14 DIAGNOSIS — Z23 ENCOUNTER FOR IMMUNIZATION: ICD-10-CM

## 2020-09-14 DIAGNOSIS — Z71.89 EDUCATED ABOUT COVID-19 VIRUS INFECTION: ICD-10-CM

## 2020-09-14 PROCEDURE — G9717 DOC PT DX DEP/BP F/U NT REQ: HCPCS | Performed by: INTERNAL MEDICINE

## 2020-09-14 PROCEDURE — G0463 HOSPITAL OUTPT CLINIC VISIT: HCPCS | Performed by: INTERNAL MEDICINE

## 2020-09-14 PROCEDURE — G8427 DOCREV CUR MEDS BY ELIG CLIN: HCPCS | Performed by: INTERNAL MEDICINE

## 2020-09-14 PROCEDURE — G8417 CALC BMI ABV UP PARAM F/U: HCPCS | Performed by: INTERNAL MEDICINE

## 2020-09-14 PROCEDURE — G8752 SYS BP LESS 140: HCPCS | Performed by: INTERNAL MEDICINE

## 2020-09-14 PROCEDURE — 1101F PT FALLS ASSESS-DOCD LE1/YR: CPT | Performed by: INTERNAL MEDICINE

## 2020-09-14 PROCEDURE — 99214 OFFICE O/P EST MOD 30 MIN: CPT | Performed by: INTERNAL MEDICINE

## 2020-09-14 PROCEDURE — G8536 NO DOC ELDER MAL SCRN: HCPCS | Performed by: INTERNAL MEDICINE

## 2020-09-14 PROCEDURE — G8754 DIAS BP LESS 90: HCPCS | Performed by: INTERNAL MEDICINE

## 2020-09-14 RX ORDER — ZOSTER VACCINE RECOMBINANT, ADJUVANTED 50 MCG/0.5
KIT INTRAMUSCULAR
Qty: 0.5 ML | Refills: 1 | Status: SHIPPED | OUTPATIENT
Start: 2020-09-14 | End: 2021-09-15

## 2020-09-14 NOTE — PROGRESS NOTES
Davon Hillman is a 67 y.o. male who was seen in clinic today (9/14/2020). Assessment & Plan:     Diagnoses and all orders for this visit:    1. Controlled type 2 diabetes mellitus with diabetic polyneuropathy, without long-term current use of insulin ([facility]- well controlled, continue current treatment, will defer labs, neuropathy stable so continue w/ same dose of Gabapentin. 2. Coronary artery disease involving native coronary artery of native heart without angina pectoris- asymptomatic. BP is well controlled, lipids are well controlled. Continue: current plan. 3. Essential hypertension, benign- well controlled, continue current treatment     4. Pure hypercholesterolemia- at goal, continue current treatment     5. Encounter for immunization  -     varicella-zoster recombinant, PF, (Shingrix, PF,) 50 mcg/0.5 mL susr injection; 0.5 ml IM once and then repeat in 2-6 months. 6. Recurrent major depressive disorder, in full remission ([facility] [address]- well controlled, continue current treatment     7. Educated about COVID-19 virus infection      Follow-up and Dispositions    · Return in about 6 months (around 3/14/2021) for FULL PHYSICAL - 30 minutes. Subjective:   Rubin Ramirez was seen today for Coronary Artery Disease; Diabetes; and Depression        Since last visit: no changes      Endocrine Review  He is seen for diabetes and obesity. Testing: is performed sporadically, fasting minimum reading is 106, maximum reading is 120's, and average reading is 110's, patient did not bring glucose log to this visit. He reports medication compliance: compliant all of the time. Medication side effects: none. Diabetic diet compliance: compliant all of the time. Lab review: labs reviewed and discussed with patient.       Last PDMP Porter Chan as Reviewed:  Review User Review Instant Review Result   YIMI LUJAN 9/14/2020  8:51 AM Reviewed PDMP [1]         Cardiovascular Review  The patient has hypertension, hyperlipidemia and coronary artery disease. He reports taking medications as instructed, no medication side effects noted, patient does not perform home BP monitoring. Diet and Lifestyle: generally follows a low fat low cholesterol diet, generally follows a low sodium diet, exercises sporadically. Labs: reviewed and discussed with patient. Mental Health Review  Patient is seen for depression. Previously PHQ9 reviewed. No changes. Reports experiences the following side effects from the treatment: none. Prior to Admission medications    Medication Sig Start Date End Date Taking? Authorizing Provider   metoprolol tartrate (LOPRESSOR) 25 mg tablet TAKE 1/2 TABLET BY MOUTH TWICE DAILY 9/8/20  Yes Carlos Ramos MD   sertraline (ZOLOFT) 50 mg tablet TAKE 1 AND 1/2 TABLETS BY MOUTH EVERY DAY 9/8/20  Yes Carlos Ramos MD   simvastatin (ZOCOR) 40 mg tablet TAKE 1 TABLET BY MOUTH AT BEDTIME 9/7/20  Yes Carlos Ramos MD   empagliflozin (Jardiance) 10 mg tablet TAKE 1 TABLET BY MOUTH EVERY DAY 8/24/20  Yes Lexii Stone NP   gabapentin (NEURONTIN) 100 mg capsule TAKE 1 CAPSULE BY MOUTH THREE TIMES DAILY 6/23/20  Yes Carlos Ramos MD   metFORMIN ER (GLUCOPHAGE XR) 500 mg tablet TAKE 1 TABLET BY MOUTH EVERY MORNING & TAKE 2 TABLETS BY MOUTH DAILY IN THE EVENING 4/24/20  Yes Carlos Ramos MD   lisinopriL (PRINIVIL, ZESTRIL) 5 mg tablet TAKE 1 TABLET BY MOUTH EVERY DAY 3/29/20  Yes Carlos Ramos MD   glucose blood VI test strips (True Metrix Glucose Test Strip) strip Test blood sugars daily. DX: E11.40, E11.65 3/13/20  Yes Carlos Ramos MD   lancets misc Use daily as directed DX: E11.49 3/14/19  Yes Carlos Ramos MD   Blood-Glucose Meter (TRUE METRIX GLUCOSE METER) misc Use to test blood sugars daily. DX:  E11.49 3/13/19  Yes Carlos Ramos MD   mesalamdavid MURRAY (ASACOL HD) 800 mg DR tablet Take 1,600 mg by mouth three (3) times daily.    Yes Provider, Historical   MULTIVITAMIN PO Take 1 Tab by mouth daily. Yes Provider, Historical   cholecalciferol, vitamin D3, (VITAMIN D3) 2,000 unit tab Take 2,000 Units by mouth daily. Yes Provider, Historical   melatonin tab tablet Take 5 mg by mouth nightly. Yes Provider, Historical   VIT B6/MAG CIT & OX/POTASS CIT (THERALITH XR PO) Take 2 tablets by mouth two (2) times a day. Yes Provider, Historical   aspirin 81 mg tablet Take 81 mg by mouth nightly. Indications: myocardial infarction prevention   Yes Provider, Historical   budesonide (ENTOCORT EC) 3 mg capsule TAKE 3 CAPSULES BY MOUTH EVERY DAY FOR 8 WEEKS 6/8/20 9/14/20  Provider, Historical   dicyclomine (BENTYL) 10 mg capsule Take 1 Cap by mouth three (3) times daily as needed for Pain or Diarrhea. 9/17/19 9/14/20  Kermit Torres MD          Allergies   Allergen Reactions   Dayanara  [Mesalamine] Diarrhea     Per patient - not allergic    Prednisone Other (comments)     Constipation - per patient not allergic    Sulfasalazine Hives           Review of Systems   Constitutional: Negative for malaise/fatigue and weight loss. Respiratory: Negative for cough and shortness of breath. Cardiovascular: Negative for chest pain, palpitations and leg swelling. Gastrointestinal: Negative for abdominal pain, constipation, diarrhea, heartburn, nausea and vomiting. Musculoskeletal: Negative for joint pain and myalgias. Skin: Negative for rash. Neurological: Negative for dizziness and headaches. Psychiatric/Behavioral: Negative for depression. The patient is not nervous/anxious and does not have insomnia. Objective:   Physical Exam  Constitutional:       General: He is not in acute distress. Appearance: Normal appearance. Eyes:      Conjunctiva/sclera: Conjunctivae normal.   Cardiovascular:      Rate and Rhythm: Regular rhythm. Heart sounds: No murmur.    Pulmonary:      Effort: Pulmonary effort is normal.      Breath sounds: Normal breath sounds. No decreased breath sounds or wheezing. Abdominal:      General: Bowel sounds are normal.      Palpations: Abdomen is soft. Tenderness: There is no abdominal tenderness. Musculoskeletal:      Right lower leg: No edema. Left lower leg: No edema. Neurological:      Comments:   Left Foot:   Visual Exam: normal    Pulse DP: 2+ (normal)   Filament test: normal sensation   Right Foot:   Visual Exam: normal    Pulse DP: 2+ (normal)   Filament test: normal sensation    Psychiatric:         Mood and Affect: Mood and affect normal.         Behavior: Behavior normal.           Visit Vitals  /70   Pulse 70   Temp 98.2 °F (36.8 °C) (Oral)   Resp 16   Ht 6' (1.829 m)   Wt 221 lb (100.2 kg)   SpO2 98%   BMI 29.97 kg/m²         Disclaimer:  Advised him to call back or return to office if symptoms worsen/change/persist.  Discussed expected course/resolution/complications of diagnosis in detail with patient. Medication risks/benefits/costs/interactions/alternatives discussed with patient. He was given an after visit summary which includes diagnoses, current medications, & vitals. He expressed understanding with the diagnosis and plan. Aspects of this note may have been generated using voice recognition software. Despite editing, there may be some syntax errors.        Jose Arguello MD

## 2020-09-15 RX ORDER — MESALAMINE 1.2 G/1
2.4 TABLET, DELAYED RELEASE ORAL 2 TIMES DAILY
COMMUNITY
End: 2021-03-15 | Stop reason: DRUGHIGH

## 2020-09-15 NOTE — TELEPHONE ENCOUNTER
Took at look at his last GI note and he is currently taking Lialda 1.2g 2 tablets bid-which matches the mesalamine product patient states he is using. Updated patient's med list with information above. Clayton Solorzano, BillyD, BCACP    CLINICAL PHARMACY CONSULT: MED RECONCILIATION/REVIEW ADDENDUM    For Pharmacy Admin Tracking Only    PHSO: PHSO Patient?: Yes  Total # of Interventions Recommended: Count: 1  - Updated Order #: 1 Updated Order Reason(s):  Other  Total Interventions Accepted: 1  Time Spent (min): 10    Get Bush, BILLYD, BCACP

## 2020-09-15 NOTE — TELEPHONE ENCOUNTER
Call to patient, identified with 2 identifiers. Reports he is taking Mesalamine 1200 mg, 2 tabs twice a day. Unable to located to update chart. Keron Cruzve to look into.

## 2020-10-04 RX ORDER — LISINOPRIL 5 MG/1
TABLET ORAL
Qty: 90 TAB | Refills: 1 | Status: SHIPPED | OUTPATIENT
Start: 2020-10-04 | End: 2021-03-15 | Stop reason: SDUPTHER

## 2020-10-06 DIAGNOSIS — E11.42 CONTROLLED TYPE 2 DIABETES MELLITUS WITH DIABETIC POLYNEUROPATHY, WITHOUT LONG-TERM CURRENT USE OF INSULIN (HCC): Primary | ICD-10-CM

## 2020-10-07 RX ORDER — METFORMIN HYDROCHLORIDE 500 MG/1
TABLET, EXTENDED RELEASE ORAL
Qty: 270 TAB | Refills: 1 | Status: SHIPPED | OUTPATIENT
Start: 2020-10-07 | End: 2021-03-15 | Stop reason: SDUPTHER

## 2020-10-14 ENCOUNTER — TRANSCRIBE ORDER (OUTPATIENT)
Dept: SCHEDULING | Age: 80
End: 2020-10-14

## 2020-10-14 DIAGNOSIS — C02.1 MALIGNANT NEOPLASM OF TIP AND LATERAL BORDER OF TONGUE (HCC): Primary | ICD-10-CM

## 2020-10-14 DIAGNOSIS — I25.10 CAD (CORONARY ARTERY DISEASE): ICD-10-CM

## 2020-10-14 DIAGNOSIS — Z78.9 NON-SMOKER: ICD-10-CM

## 2020-10-14 DIAGNOSIS — Z00.8 OTHER SPECIFIED GENERAL MEDICAL EXAMINATION: ICD-10-CM

## 2020-10-29 NOTE — PERIOP NOTES
PATIENT'S WIFE CALLED AND MADE AWARE OF COVID-19 TESTING NEEDED TO BE DONE WITHIN 96 HOURS OF SURGERY. COVID-19 TESTING APPOINTMENT MADE FOR PATIENT. INSTRUCTED ON SELF QUARANTINE BETWEEN TESTING AND ARRIVAL TIME DAY OF SURGERY. ALSO INFORMED TO NOT USE ANY NASAL SPRAYS OR NASAL OINTMENTS PRIOR TO NASAL SWAB.

## 2020-11-02 ENCOUNTER — HOSPITAL ENCOUNTER (OUTPATIENT)
Dept: PREADMISSION TESTING | Age: 80
Discharge: HOME | End: 2020-11-02
Payer: MEDICARE

## 2020-11-02 VITALS
SYSTOLIC BLOOD PRESSURE: 119 MMHG | BODY MASS INDEX: 28.94 KG/M2 | WEIGHT: 225.53 LBS | HEART RATE: 66 BPM | HEIGHT: 74 IN | DIASTOLIC BLOOD PRESSURE: 70 MMHG | TEMPERATURE: 98.1 F

## 2020-11-02 LAB
ANION GAP SERPL CALC-SCNC: 9 MMOL/L (ref 5–15)
ATRIAL RATE: 62 BPM
BASOPHILS # BLD: 0.1 K/UL (ref 0–0.1)
BASOPHILS NFR BLD: 1 % (ref 0–1)
BUN SERPL-MCNC: 13 MG/DL (ref 6–20)
BUN/CREAT SERPL: 11 (ref 12–20)
CALCIUM SERPL-MCNC: 9.6 MG/DL (ref 8.5–10.1)
CALCULATED P AXIS, ECG09: 31 DEGREES
CALCULATED R AXIS, ECG10: -7 DEGREES
CALCULATED T AXIS, ECG11: 53 DEGREES
CHLORIDE SERPL-SCNC: 104 MMOL/L (ref 97–108)
CO2 SERPL-SCNC: 26 MMOL/L (ref 21–32)
CREAT SERPL-MCNC: 1.22 MG/DL (ref 0.7–1.3)
DIAGNOSIS, 93000: NORMAL
DIFFERENTIAL METHOD BLD: NORMAL
EOSINOPHIL # BLD: 0.1 K/UL (ref 0–0.4)
EOSINOPHIL NFR BLD: 1 % (ref 0–7)
ERYTHROCYTE [DISTWIDTH] IN BLOOD BY AUTOMATED COUNT: 13.1 % (ref 11.5–14.5)
GLUCOSE SERPL-MCNC: 219 MG/DL (ref 65–100)
HCT VFR BLD AUTO: 46.2 % (ref 36.6–50.3)
HGB BLD-MCNC: 15.1 G/DL (ref 12.1–17)
IMM GRANULOCYTES # BLD AUTO: 0 K/UL (ref 0–0.04)
IMM GRANULOCYTES NFR BLD AUTO: 0 % (ref 0–0.5)
LYMPHOCYTES # BLD: 1.5 K/UL (ref 0.8–3.5)
LYMPHOCYTES NFR BLD: 20 % (ref 12–49)
MCH RBC QN AUTO: 31.1 PG (ref 26–34)
MCHC RBC AUTO-ENTMCNC: 32.7 G/DL (ref 30–36.5)
MCV RBC AUTO: 95.3 FL (ref 80–99)
MONOCYTES # BLD: 0.7 K/UL (ref 0–1)
MONOCYTES NFR BLD: 9 % (ref 5–13)
NEUTS SEG # BLD: 5.2 K/UL (ref 1.8–8)
NEUTS SEG NFR BLD: 69 % (ref 32–75)
NRBC # BLD: 0 K/UL (ref 0–0.01)
NRBC BLD-RTO: 0 PER 100 WBC
P-R INTERVAL, ECG05: 196 MS
PLATELET # BLD AUTO: 210 K/UL (ref 150–400)
PMV BLD AUTO: 10.8 FL (ref 8.9–12.9)
POTASSIUM SERPL-SCNC: 4.2 MMOL/L (ref 3.5–5.1)
Q-T INTERVAL, ECG07: 400 MS
QRS DURATION, ECG06: 88 MS
QTC CALCULATION (BEZET), ECG08: 406 MS
RBC # BLD AUTO: 4.85 M/UL (ref 4.1–5.7)
SODIUM SERPL-SCNC: 139 MMOL/L (ref 136–145)
VENTRICULAR RATE, ECG03: 62 BPM
WBC # BLD AUTO: 7.4 K/UL (ref 4.1–11.1)

## 2020-11-02 PROCEDURE — 80048 BASIC METABOLIC PNL TOTAL CA: CPT

## 2020-11-02 PROCEDURE — 83036 HEMOGLOBIN GLYCOSYLATED A1C: CPT

## 2020-11-02 PROCEDURE — 36415 COLL VENOUS BLD VENIPUNCTURE: CPT

## 2020-11-02 PROCEDURE — 93005 ELECTROCARDIOGRAM TRACING: CPT

## 2020-11-02 PROCEDURE — 85025 COMPLETE CBC W/AUTO DIFF WBC: CPT

## 2020-11-03 ENCOUNTER — HOSPITAL ENCOUNTER (OUTPATIENT)
Dept: CT IMAGING | Age: 80
Discharge: HOME | End: 2020-11-03
Attending: OTOLARYNGOLOGY
Payer: MEDICARE

## 2020-11-03 DIAGNOSIS — Z00.8 OTHER SPECIFIED GENERAL MEDICAL EXAMINATION: ICD-10-CM

## 2020-11-03 DIAGNOSIS — C02.1 MALIGNANT NEOPLASM OF TIP AND LATERAL BORDER OF TONGUE (HCC): ICD-10-CM

## 2020-11-03 DIAGNOSIS — I25.10 CAD (CORONARY ARTERY DISEASE): ICD-10-CM

## 2020-11-03 DIAGNOSIS — Z78.9 NON-SMOKER: ICD-10-CM

## 2020-11-03 LAB
EST. AVERAGE GLUCOSE BLD GHB EST-MCNC: 137 MG/DL
HBA1C MFR BLD: 6.4 % (ref 4–5.6)

## 2020-11-03 PROCEDURE — 74011000258 HC RX REV CODE- 258: Performed by: OTOLARYNGOLOGY

## 2020-11-03 PROCEDURE — 70491 CT SOFT TISSUE NECK W/DYE: CPT

## 2020-11-03 PROCEDURE — 74011000636 HC RX REV CODE- 636: Performed by: OTOLARYNGOLOGY

## 2020-11-03 RX ORDER — SODIUM CHLORIDE 0.9 % (FLUSH) 0.9 %
10 SYRINGE (ML) INJECTION ONCE
Status: COMPLETED | OUTPATIENT
Start: 2020-11-03 | End: 2020-11-03

## 2020-11-03 RX ADMIN — IOPAMIDOL 100 ML: 612 INJECTION, SOLUTION INTRAVENOUS at 07:38

## 2020-11-03 RX ADMIN — Medication 10 ML: at 07:38

## 2020-11-03 RX ADMIN — SODIUM CHLORIDE 50 ML: 900 INJECTION, SOLUTION INTRAVENOUS at 07:39

## 2020-11-05 ENCOUNTER — HOSPITAL ENCOUNTER (OUTPATIENT)
Dept: PREADMISSION TESTING | Age: 80
Discharge: HOME | End: 2020-11-05
Payer: MEDICARE

## 2020-11-05 ENCOUNTER — TRANSCRIBE ORDER (OUTPATIENT)
Dept: REGISTRATION | Age: 80
End: 2020-11-05

## 2020-11-05 DIAGNOSIS — Z01.812 PRE-PROCEDURE LAB EXAM: ICD-10-CM

## 2020-11-05 DIAGNOSIS — Z01.812 PRE-PROCEDURE LAB EXAM: Primary | ICD-10-CM

## 2020-11-05 PROCEDURE — 87635 SARS-COV-2 COVID-19 AMP PRB: CPT

## 2020-11-06 LAB — SARS-COV-2, COV2NT: NOT DETECTED

## 2020-11-09 ENCOUNTER — HOSPITAL ENCOUNTER (OUTPATIENT)
Age: 80
Setting detail: OUTPATIENT SURGERY
Discharge: HOME | End: 2020-11-09
Attending: OTOLARYNGOLOGY | Admitting: OTOLARYNGOLOGY
Payer: MEDICARE

## 2020-11-09 ENCOUNTER — ANESTHESIA EVENT (OUTPATIENT)
Dept: SURGERY | Age: 80
End: 2020-11-09
Payer: MEDICARE

## 2020-11-09 ENCOUNTER — ANESTHESIA (OUTPATIENT)
Dept: SURGERY | Age: 80
End: 2020-11-09
Payer: MEDICARE

## 2020-11-09 VITALS
HEART RATE: 75 BPM | DIASTOLIC BLOOD PRESSURE: 89 MMHG | RESPIRATION RATE: 16 BRPM | OXYGEN SATURATION: 97 % | TEMPERATURE: 97.8 F | SYSTOLIC BLOOD PRESSURE: 129 MMHG | HEIGHT: 74 IN | BODY MASS INDEX: 28.94 KG/M2 | WEIGHT: 225.5 LBS

## 2020-11-09 DIAGNOSIS — C02.3 MALIGNANT NEOPLASM OF ANTERIOR TWO-THIRDS OF TONGUE (HCC): Primary | ICD-10-CM

## 2020-11-09 LAB
GLUCOSE BLD STRIP.AUTO-MCNC: 124 MG/DL (ref 65–100)
GLUCOSE BLD STRIP.AUTO-MCNC: 133 MG/DL (ref 65–100)
SERVICE CMNT-IMP: ABNORMAL
SERVICE CMNT-IMP: ABNORMAL

## 2020-11-09 PROCEDURE — 88309 TISSUE EXAM BY PATHOLOGIST: CPT

## 2020-11-09 PROCEDURE — 74011000250 HC RX REV CODE- 250: Performed by: ANESTHESIOLOGY

## 2020-11-09 PROCEDURE — 77030019908 HC STETH ESOPH SIMS -A: Performed by: ANESTHESIOLOGY

## 2020-11-09 PROCEDURE — 88331 PATH CONSLTJ SURG 1 BLK 1SPC: CPT

## 2020-11-09 PROCEDURE — 77030040361 HC SLV COMPR DVT MDII -B: Performed by: OTOLARYNGOLOGY

## 2020-11-09 PROCEDURE — 77030040922 HC BLNKT HYPOTHRM STRY -A

## 2020-11-09 PROCEDURE — 82962 GLUCOSE BLOOD TEST: CPT

## 2020-11-09 PROCEDURE — 77030018836 HC SOL IRR NACL ICUM -A: Performed by: OTOLARYNGOLOGY

## 2020-11-09 PROCEDURE — 74011250636 HC RX REV CODE- 250/636: Performed by: OTOLARYNGOLOGY

## 2020-11-09 PROCEDURE — 77030013079 HC BLNKT BAIR HGGR 3M -A: Performed by: ANESTHESIOLOGY

## 2020-11-09 PROCEDURE — 2709999900 HC NON-CHARGEABLE SUPPLY: Performed by: OTOLARYNGOLOGY

## 2020-11-09 PROCEDURE — 77030013629 HC ELECTRD NDL STRY -B: Performed by: OTOLARYNGOLOGY

## 2020-11-09 PROCEDURE — 74011000250 HC RX REV CODE- 250: Performed by: NURSE ANESTHETIST, CERTIFIED REGISTERED

## 2020-11-09 PROCEDURE — 76210000020 HC REC RM PH II FIRST 0.5 HR: Performed by: OTOLARYNGOLOGY

## 2020-11-09 PROCEDURE — 77030008684 HC TU ET CUF COVD -B: Performed by: ANESTHESIOLOGY

## 2020-11-09 PROCEDURE — 74011250636 HC RX REV CODE- 250/636: Performed by: NURSE ANESTHETIST, CERTIFIED REGISTERED

## 2020-11-09 PROCEDURE — 74011250636 HC RX REV CODE- 250/636: Performed by: ANESTHESIOLOGY

## 2020-11-09 PROCEDURE — 74011250637 HC RX REV CODE- 250/637: Performed by: ANESTHESIOLOGY

## 2020-11-09 PROCEDURE — 77030040356 HC CORD BPLR FRCP COVD -A: Performed by: OTOLARYNGOLOGY

## 2020-11-09 PROCEDURE — 76010000162 HC OR TIME 1.5 TO 2 HR INTENSV-TIER 1: Performed by: OTOLARYNGOLOGY

## 2020-11-09 PROCEDURE — 76060000034 HC ANESTHESIA 1.5 TO 2 HR: Performed by: OTOLARYNGOLOGY

## 2020-11-09 PROCEDURE — 76210000006 HC OR PH I REC 0.5 TO 1 HR: Performed by: OTOLARYNGOLOGY

## 2020-11-09 PROCEDURE — 77030011267 HC ELECTRD BLD COVD -A: Performed by: OTOLARYNGOLOGY

## 2020-11-09 PROCEDURE — 77030026438 HC STYL ET INTUB CARD -A: Performed by: ANESTHESIOLOGY

## 2020-11-09 PROCEDURE — 77030002996 HC SUT SLK J&J -A: Performed by: OTOLARYNGOLOGY

## 2020-11-09 RX ORDER — SODIUM CHLORIDE 9 MG/ML
50 INJECTION, SOLUTION INTRAVENOUS CONTINUOUS
Status: DISCONTINUED | OUTPATIENT
Start: 2020-11-09 | End: 2020-11-09 | Stop reason: HOSPADM

## 2020-11-09 RX ORDER — FENTANYL CITRATE 50 UG/ML
INJECTION, SOLUTION INTRAMUSCULAR; INTRAVENOUS AS NEEDED
Status: DISCONTINUED | OUTPATIENT
Start: 2020-11-09 | End: 2020-11-09 | Stop reason: HOSPADM

## 2020-11-09 RX ORDER — SODIUM CHLORIDE, SODIUM LACTATE, POTASSIUM CHLORIDE, CALCIUM CHLORIDE 600; 310; 30; 20 MG/100ML; MG/100ML; MG/100ML; MG/100ML
125 INJECTION, SOLUTION INTRAVENOUS CONTINUOUS
Status: DISCONTINUED | OUTPATIENT
Start: 2020-11-09 | End: 2020-11-09 | Stop reason: HOSPADM

## 2020-11-09 RX ORDER — SODIUM CHLORIDE 0.9 % (FLUSH) 0.9 %
5-40 SYRINGE (ML) INJECTION EVERY 8 HOURS
Status: DISCONTINUED | OUTPATIENT
Start: 2020-11-09 | End: 2020-11-09 | Stop reason: HOSPADM

## 2020-11-09 RX ORDER — SODIUM CHLORIDE 9 MG/ML
1000 INJECTION, SOLUTION INTRAVENOUS CONTINUOUS
Status: DISCONTINUED | OUTPATIENT
Start: 2020-11-09 | End: 2020-11-09 | Stop reason: HOSPADM

## 2020-11-09 RX ORDER — MIDAZOLAM HYDROCHLORIDE 1 MG/ML
1 INJECTION, SOLUTION INTRAMUSCULAR; INTRAVENOUS AS NEEDED
Status: DISCONTINUED | OUTPATIENT
Start: 2020-11-09 | End: 2020-11-09 | Stop reason: HOSPADM

## 2020-11-09 RX ORDER — OXYCODONE AND ACETAMINOPHEN 5; 325 MG/1; MG/1
1 TABLET ORAL
Qty: 20 TAB | Refills: 0 | Status: SHIPPED | OUTPATIENT
Start: 2020-11-09 | End: 2020-11-16

## 2020-11-09 RX ORDER — ONDANSETRON 4 MG/1
4 TABLET, ORALLY DISINTEGRATING ORAL
Qty: 8 TAB | Refills: 1 | Status: SHIPPED | OUTPATIENT
Start: 2020-11-09 | End: 2021-03-05

## 2020-11-09 RX ORDER — EPHEDRINE SULFATE/0.9% NACL/PF 50 MG/5 ML
SYRINGE (ML) INTRAVENOUS AS NEEDED
Status: DISCONTINUED | OUTPATIENT
Start: 2020-11-09 | End: 2020-11-09 | Stop reason: HOSPADM

## 2020-11-09 RX ORDER — HYDROMORPHONE HYDROCHLORIDE 1 MG/ML
0.2 INJECTION, SOLUTION INTRAMUSCULAR; INTRAVENOUS; SUBCUTANEOUS
Status: DISCONTINUED | OUTPATIENT
Start: 2020-11-09 | End: 2020-11-09 | Stop reason: HOSPADM

## 2020-11-09 RX ORDER — SODIUM CHLORIDE 0.9 % (FLUSH) 0.9 %
5-40 SYRINGE (ML) INJECTION AS NEEDED
Status: DISCONTINUED | OUTPATIENT
Start: 2020-11-09 | End: 2020-11-09 | Stop reason: HOSPADM

## 2020-11-09 RX ORDER — SUCCINYLCHOLINE CHLORIDE 20 MG/ML
INJECTION INTRAMUSCULAR; INTRAVENOUS AS NEEDED
Status: DISCONTINUED | OUTPATIENT
Start: 2020-11-09 | End: 2020-11-09 | Stop reason: HOSPADM

## 2020-11-09 RX ORDER — ACETAMINOPHEN 325 MG/1
650 TABLET ORAL ONCE
Status: COMPLETED | OUTPATIENT
Start: 2020-11-09 | End: 2020-11-09

## 2020-11-09 RX ORDER — OXYCODONE AND ACETAMINOPHEN 5; 325 MG/1; MG/1
1 TABLET ORAL AS NEEDED
Status: DISCONTINUED | OUTPATIENT
Start: 2020-11-09 | End: 2020-11-09 | Stop reason: HOSPADM

## 2020-11-09 RX ORDER — PHENYLEPHRINE HCL IN 0.9% NACL 0.4MG/10ML
SYRINGE (ML) INTRAVENOUS AS NEEDED
Status: DISCONTINUED | OUTPATIENT
Start: 2020-11-09 | End: 2020-11-09 | Stop reason: HOSPADM

## 2020-11-09 RX ORDER — CEFAZOLIN SODIUM/WATER 2 G/20 ML
2 SYRINGE (ML) INTRAVENOUS ONCE
Status: COMPLETED | OUTPATIENT
Start: 2020-11-09 | End: 2020-11-09

## 2020-11-09 RX ORDER — DIPHENHYDRAMINE HYDROCHLORIDE 50 MG/ML
12.5 INJECTION, SOLUTION INTRAMUSCULAR; INTRAVENOUS AS NEEDED
Status: DISCONTINUED | OUTPATIENT
Start: 2020-11-09 | End: 2020-11-09 | Stop reason: HOSPADM

## 2020-11-09 RX ORDER — LIDOCAINE HYDROCHLORIDE 10 MG/ML
0.1 INJECTION, SOLUTION EPIDURAL; INFILTRATION; INTRACAUDAL; PERINEURAL AS NEEDED
Status: DISCONTINUED | OUTPATIENT
Start: 2020-11-09 | End: 2020-11-09 | Stop reason: HOSPADM

## 2020-11-09 RX ORDER — LIDOCAINE HYDROCHLORIDE 20 MG/ML
INJECTION, SOLUTION EPIDURAL; INFILTRATION; INTRACAUDAL; PERINEURAL AS NEEDED
Status: DISCONTINUED | OUTPATIENT
Start: 2020-11-09 | End: 2020-11-09 | Stop reason: HOSPADM

## 2020-11-09 RX ORDER — ONDANSETRON 2 MG/ML
4 INJECTION INTRAMUSCULAR; INTRAVENOUS AS NEEDED
Status: DISCONTINUED | OUTPATIENT
Start: 2020-11-09 | End: 2020-11-09 | Stop reason: HOSPADM

## 2020-11-09 RX ORDER — EPHEDRINE SULFATE/0.9% NACL/PF 50 MG/5 ML
5 SYRINGE (ML) INTRAVENOUS AS NEEDED
Status: DISCONTINUED | OUTPATIENT
Start: 2020-11-09 | End: 2020-11-09 | Stop reason: HOSPADM

## 2020-11-09 RX ORDER — ROCURONIUM BROMIDE 10 MG/ML
INJECTION, SOLUTION INTRAVENOUS AS NEEDED
Status: DISCONTINUED | OUTPATIENT
Start: 2020-11-09 | End: 2020-11-09 | Stop reason: HOSPADM

## 2020-11-09 RX ORDER — PROPOFOL 10 MG/ML
INJECTION, EMULSION INTRAVENOUS AS NEEDED
Status: DISCONTINUED | OUTPATIENT
Start: 2020-11-09 | End: 2020-11-09 | Stop reason: HOSPADM

## 2020-11-09 RX ORDER — MIDAZOLAM HYDROCHLORIDE 1 MG/ML
0.5 INJECTION, SOLUTION INTRAMUSCULAR; INTRAVENOUS
Status: DISCONTINUED | OUTPATIENT
Start: 2020-11-09 | End: 2020-11-09 | Stop reason: HOSPADM

## 2020-11-09 RX ORDER — FENTANYL CITRATE 50 UG/ML
25 INJECTION, SOLUTION INTRAMUSCULAR; INTRAVENOUS
Status: DISCONTINUED | OUTPATIENT
Start: 2020-11-09 | End: 2020-11-09 | Stop reason: HOSPADM

## 2020-11-09 RX ORDER — FENTANYL CITRATE 50 UG/ML
50 INJECTION, SOLUTION INTRAMUSCULAR; INTRAVENOUS AS NEEDED
Status: DISCONTINUED | OUTPATIENT
Start: 2020-11-09 | End: 2020-11-09 | Stop reason: HOSPADM

## 2020-11-09 RX ORDER — ONDANSETRON 2 MG/ML
INJECTION INTRAMUSCULAR; INTRAVENOUS AS NEEDED
Status: DISCONTINUED | OUTPATIENT
Start: 2020-11-09 | End: 2020-11-09 | Stop reason: HOSPADM

## 2020-11-09 RX ORDER — MORPHINE SULFATE 10 MG/ML
2 INJECTION, SOLUTION INTRAMUSCULAR; INTRAVENOUS
Status: DISCONTINUED | OUTPATIENT
Start: 2020-11-09 | End: 2020-11-09 | Stop reason: HOSPADM

## 2020-11-09 RX ORDER — CHLORHEXIDINE GLUCONATE 1.2 MG/ML
15 RINSE ORAL
Qty: 480 ML | Refills: 2 | Status: SHIPPED | OUTPATIENT
Start: 2020-11-09 | End: 2020-11-23

## 2020-11-09 RX ORDER — DEXAMETHASONE SODIUM PHOSPHATE 4 MG/ML
INJECTION, SOLUTION INTRA-ARTICULAR; INTRALESIONAL; INTRAMUSCULAR; INTRAVENOUS; SOFT TISSUE AS NEEDED
Status: DISCONTINUED | OUTPATIENT
Start: 2020-11-09 | End: 2020-11-09 | Stop reason: HOSPADM

## 2020-11-09 RX ADMIN — DEXAMETHASONE SODIUM PHOSPHATE 4 MG: 4 INJECTION, SOLUTION INTRAMUSCULAR; INTRAVENOUS at 11:42

## 2020-11-09 RX ADMIN — ROCURONIUM BROMIDE 5 MG: 10 SOLUTION INTRAVENOUS at 11:32

## 2020-11-09 RX ADMIN — FENTANYL CITRATE 50 MCG: 50 INJECTION, SOLUTION INTRAMUSCULAR; INTRAVENOUS at 11:43

## 2020-11-09 RX ADMIN — ACETAMINOPHEN 650 MG: 325 TABLET ORAL at 10:44

## 2020-11-09 RX ADMIN — ONDANSETRON 4 MG: 2 INJECTION INTRAMUSCULAR; INTRAVENOUS at 13:30

## 2020-11-09 RX ADMIN — Medication 2 G: at 11:42

## 2020-11-09 RX ADMIN — FENTANYL CITRATE 50 MCG: 50 INJECTION, SOLUTION INTRAMUSCULAR; INTRAVENOUS at 11:57

## 2020-11-09 RX ADMIN — Medication 10 MG: at 12:35

## 2020-11-09 RX ADMIN — PROPOFOL 150 MG: 10 INJECTION, EMULSION INTRAVENOUS at 11:32

## 2020-11-09 RX ADMIN — SODIUM CHLORIDE, SODIUM LACTATE, POTASSIUM CHLORIDE, AND CALCIUM CHLORIDE 125 ML/HR: 600; 310; 30; 20 INJECTION, SOLUTION INTRAVENOUS at 10:44

## 2020-11-09 RX ADMIN — FENTANYL CITRATE 50 MCG: 50 INJECTION, SOLUTION INTRAMUSCULAR; INTRAVENOUS at 11:32

## 2020-11-09 RX ADMIN — SUCCINYLCHOLINE CHLORIDE 160 MG: 20 INJECTION, SOLUTION INTRAMUSCULAR; INTRAVENOUS at 11:32

## 2020-11-09 RX ADMIN — LIDOCAINE HYDROCHLORIDE 100 MG: 20 INJECTION, SOLUTION EPIDURAL; INFILTRATION; INTRACAUDAL; PERINEURAL at 11:32

## 2020-11-09 RX ADMIN — PROPOFOL 25 MG: 10 INJECTION, EMULSION INTRAVENOUS at 11:42

## 2020-11-09 RX ADMIN — ONDANSETRON HYDROCHLORIDE 4 MG: 2 INJECTION, SOLUTION INTRAMUSCULAR; INTRAVENOUS at 12:52

## 2020-11-09 RX ADMIN — Medication 80 MCG: at 11:47

## 2020-11-09 RX ADMIN — Medication 10 MG: at 12:17

## 2020-11-09 NOTE — BRIEF OP NOTE
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       Procedure(s):  RIGHT PARTIAL GLOSSECTOMY    Surgeon(s):  James Gerardo MD    Surgical Assistant: Surg Asst-1: Mchenry Lustdayday    Anesthesia: General     Estimated Blood Loss (mL): Minimal    Complications: None    Specimens:   ID Type Source Tests Collected by Time Destination   2 : RIGHT PARTIAL GLOSSECTOMY Frozen Section Tongue  James Gerardo MD 11/9/2020 1158 Pathology        Implants: * No implants in log *    Drains: * No LDAs found *    Findings: frozen margins neg    Electronically Signed by Rolando Torres MD on 11/9/2020 at 12:58 PM

## 2020-11-09 NOTE — DISCHARGE INSTRUCTIONS
[facility]    Head and Neck Post Operative Instructions    1. DIET  Liquid/soft diet as tolerated. 2.  ACTIVITY  No heavy exertion or heavy lifting for 10 days. .      3. THINGS TO BE CONCERNED ABOUT  Please call the office for any of these changes  A. Increasing pain  B. New red discoloration  C. New drainage of any description  D. Increasing warmth of wound    4. LONG-TERM WOUND HEALING  A. Expect the wound to have a slight ridge for up to 6 weeks. This is usually by design for optimal wound healing. B. Local numbness is usual around wound sites, and can last up to and beyond 3 months. C. To minimize the prolonged redness or pigmentation around a wound, we recommend use of sunscreen for 3 months or longer on healed wounds. If you have any questions or concerns following your surgery, do not hesitate to contact our office at    Office Phone:  [phone] office hours are 8:00 a.m. to 4:30 p.m. You should be able to reach us after hours by calling the regular office number. If for some reason you are not able to reach our [address] [place] [address] through this main number you may call them directly at [phone].      >> You were given Tylenol 650 mg at 10:44 am.    >> You were given Zofran 4 mg at 1:30 pm.        ______________________________________________________________________    Anesthesia Discharge Instructions    After general anesthesia or intervenous sedation, for 24 hours or while taking prescription Narcotics:  · Limit your activities  · Do not drive or operate hazardous machinery  · If you have not urinated within 8 hours after discharge, please contact your surgeon on call.   · Do not make important personal or business decisions  · Do not drink alcoholic beverages    Report the following to your surgeon:  · Excessive pain, swelling, redness or odor of or around the surgical area  · Temperature over 100.5 degrees  · Nausea and vomiting lasting longer than 4 hours or if unable to take medication  · Any signs of decreased circulation or nerve impairment to extremity:  Change in color, persistent numbness, tingling, coldness or increased pain.   · Any questions

## 2020-11-09 NOTE — ANESTHESIA POSTPROCEDURE EVALUATION
Procedure(s):  RIGHT PARTIAL GLOSSECTOMY. general    Anesthesia Post Evaluation      Multimodal analgesia: multimodal analgesia used between 6 hours prior to anesthesia start to PACU discharge  Patient location during evaluation: PACU  Patient participation: complete - patient participated  Level of consciousness: awake  Pain score: 2  Pain management: adequate  Airway patency: patent  Anesthetic complications: no  Cardiovascular status: acceptable  Respiratory status: acceptable  Hydration status: acceptable  Comments: I have evaluated the patient and meets criteria for discharge from PACU. Martha Montgomery MD  Post anesthesia nausea and vomiting:  controlled      INITIAL Post-op Vital signs: No vitals data found for the desired time range.

## 2020-11-09 NOTE — ANESTHESIA PREPROCEDURE EVALUATION
Relevant Problems   No relevant active problems       Anesthetic History   No history of anesthetic complications            Review of Systems / Medical History  Patient summary reviewed, nursing notes reviewed and pertinent labs reviewed    Pulmonary  Within defined limits      Sleep apnea           Neuro/Psych   Within defined limits           Cardiovascular  Within defined limits  Hypertension          CAD and CABG         GI/Hepatic/Renal  Within defined limits         PUD     Endo/Other  Within defined limits  Diabetes         Other Findings              Physical Exam    Airway  Mallampati: II  TM Distance: > 6 cm  Neck ROM: normal range of motion   Mouth opening: Normal     Cardiovascular  Regular rate and rhythm,  S1 and S2 normal,  no murmur, click, rub, or gallop             Dental  No notable dental hx       Pulmonary  Breath sounds clear to auscultation               Abdominal  GI exam deferred       Other Findings            Anesthetic Plan    ASA: 3  Anesthesia type: general          Induction: Intravenous  Anesthetic plan and risks discussed with: Patient

## 2020-11-09 NOTE — PROGRESS NOTES
1410: Discharge instructions provided to patient and patient's wife per patient's request. Opportunities for questions offered. Patient and patient's wife verbalized understanding of discharge instructions and have no further questions at this time.  Patient discharged with copy of AVS.

## 2020-11-09 NOTE — ROUTINE PROCESS
Patient: Braydon Ventura MRN: [id number]  SSN: [id number]   YOB: 1940  Age: 67 y.o. Sex: male     Patient is status post Procedure(s):  RIGHT PARTIAL GLOSSECTOMY.     Surgeon(s) and Role:     * Goldie Gerardo MD - Primary    Local/Dose/Irrigation:  SEE MAR                                         Dressing/Packing:       Splint/Cast:  ]    Other:

## 2020-11-10 NOTE — OP NOTES
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 PROCEDURE PERFORMED:  Right partial glossectomy. SURGEON:  Cahse Raza MD    ASSISTANT:  none. ANESTHESIA:  General endotracheal anesthesia. COMPLICATIONS:  None. SPECIMENS REMOVED:  Right partial glossectomy. IMPLANTS:  none. ESTIMATED BLOOD LOSS:  10 mL. INDICATIONS:  This is a 16-year-old gentleman who recently underwent excision of a right lateral tongue lesion. Final pathology showed squamous cell carcinoma. Imaging was performed which showed no evidence of regional metastasis. Based on this, he was scheduled for elective reexcision to obtain adequate margins. Risks of surgery discussed including bleeding, infection, pain, dysphagia, dysarthria, taste changes, and need for further surgery. OPERATIVE DETAIL:  The patient was brought to the operating room and general endotracheal anesthesia was induced. The patient was prepped and draped in the usual fashion. Gissell Ruck was placed to open the mouth. A 0 silk suture was placed into the midline of the tongue for retraction. The tongue was then examined. At the site of the previous biopsy site, he had an approximately 5 mm raised irregular-appearing lesion that had the appearance of tumor regrowth. An elliptical incision was then designed around the lesion taking a large margin of normal-appearing tissue. The mucosal incisions were made with needle-tip cautery. Cautery was then used to continue dissecting around the deep aspects of the lesion, eventually freeing it. It was suture oriented and sent for frozen section. The margins returned as negative. Bipolar was used for hemostasis.   The wound was then closed in layers using 3-0 Vicryl in an interrupted horizontal mattress fashion. Prior to closure, the wound was injected with 1% lidocaine with epinephrine 1:100,000. At this time, the procedure was terminated. The patient was awakened, extubated, and taken to PACU in stable condition.       MD CORA [id number]  D:  11/09/2020 13:40  T:  11/09/2020 20:13  JOB #:  [id number]

## 2020-11-11 PROBLEM — C02.9 SQUAMOUS CELL CARCINOMA OF TONGUE (HCC): Status: ACTIVE | Noted: 2020-11-11

## 2020-12-18 DIAGNOSIS — E11.42 DIABETIC PERIPHERAL NEUROPATHY (HCC): ICD-10-CM

## 2020-12-18 RX ORDER — GABAPENTIN 100 MG/1
CAPSULE ORAL
Qty: 270 CAP | Refills: 0 | Status: SHIPPED | OUTPATIENT
Start: 2020-12-18 | End: 2021-03-15 | Stop reason: SDUPTHER

## 2020-12-19 NOTE — TELEPHONE ENCOUNTER
Chart & [facility]  reviewed. Last visit with me:  9/14/2020 Last refilled on: 11/19/20 Future Appointments Date Time Provider Starla Hedrick 3/15/2021  1:30 PM MD WILFREDO Rouse BS AMB Last PDMP Betty Santizo as Reviewed: 
Review User Review Instant Review Result YIMI LUJAN 12/18/2020  9:31 PM Reviewed PDMP [1]

## 2021-02-19 DIAGNOSIS — I25.10 CORONARY ARTERY DISEASE INVOLVING NATIVE CORONARY ARTERY WITHOUT ANGINA PECTORIS, UNSPECIFIED WHETHER NATIVE OR TRANSPLANTED HEART: ICD-10-CM

## 2021-02-21 RX ORDER — METOPROLOL TARTRATE 25 MG/1
TABLET, FILM COATED ORAL
Qty: 90 TAB | Refills: 1 | Status: SHIPPED | OUTPATIENT
Start: 2021-02-21 | End: 2021-03-15 | Stop reason: SDUPTHER

## 2021-03-02 ENCOUNTER — TELEPHONE (OUTPATIENT)
Dept: INTERNAL MEDICINE CLINIC | Age: 81
End: 2021-03-02

## 2021-03-05 ENCOUNTER — HOSPITAL ENCOUNTER (OUTPATIENT)
Dept: PREADMISSION TESTING | Age: 81
Discharge: HOME | End: 2021-03-05
Payer: MEDICARE

## 2021-03-05 VITALS
WEIGHT: 225.09 LBS | HEART RATE: 65 BPM | DIASTOLIC BLOOD PRESSURE: 65 MMHG | TEMPERATURE: 97.8 F | HEIGHT: 73 IN | BODY MASS INDEX: 29.83 KG/M2 | RESPIRATION RATE: 14 BRPM | SYSTOLIC BLOOD PRESSURE: 104 MMHG

## 2021-03-05 LAB
ANION GAP SERPL CALC-SCNC: 5 MMOL/L (ref 5–15)
BASOPHILS # BLD: 0.1 K/UL (ref 0–0.1)
BASOPHILS NFR BLD: 1 % (ref 0–1)
BUN SERPL-MCNC: 12 MG/DL (ref 6–20)
BUN/CREAT SERPL: 12 (ref 12–20)
CALCIUM SERPL-MCNC: 9.7 MG/DL (ref 8.5–10.1)
CHLORIDE SERPL-SCNC: 107 MMOL/L (ref 97–108)
CO2 SERPL-SCNC: 27 MMOL/L (ref 21–32)
CREAT SERPL-MCNC: 1.03 MG/DL (ref 0.7–1.3)
DIFFERENTIAL METHOD BLD: NORMAL
EOSINOPHIL # BLD: 0.1 K/UL (ref 0–0.4)
EOSINOPHIL NFR BLD: 1 % (ref 0–7)
ERYTHROCYTE [DISTWIDTH] IN BLOOD BY AUTOMATED COUNT: 13.7 % (ref 11.5–14.5)
EST. AVERAGE GLUCOSE BLD GHB EST-MCNC: 148 MG/DL
GLUCOSE SERPL-MCNC: 162 MG/DL (ref 65–100)
HBA1C MFR BLD: 6.8 % (ref 4–5.6)
HCT VFR BLD AUTO: 42.7 % (ref 36.6–50.3)
HGB BLD-MCNC: 14.2 G/DL (ref 12.1–17)
IMM GRANULOCYTES # BLD AUTO: 0 K/UL (ref 0–0.04)
IMM GRANULOCYTES NFR BLD AUTO: 0 % (ref 0–0.5)
LYMPHOCYTES # BLD: 1.7 K/UL (ref 0.8–3.5)
LYMPHOCYTES NFR BLD: 23 % (ref 12–49)
MCH RBC QN AUTO: 30.5 PG (ref 26–34)
MCHC RBC AUTO-ENTMCNC: 33.3 G/DL (ref 30–36.5)
MCV RBC AUTO: 91.6 FL (ref 80–99)
MONOCYTES # BLD: 0.6 K/UL (ref 0–1)
MONOCYTES NFR BLD: 8 % (ref 5–13)
NEUTS SEG # BLD: 4.7 K/UL (ref 1.8–8)
NEUTS SEG NFR BLD: 67 % (ref 32–75)
NRBC # BLD: 0 K/UL (ref 0–0.01)
NRBC BLD-RTO: 0 PER 100 WBC
PLATELET # BLD AUTO: 189 K/UL (ref 150–400)
PMV BLD AUTO: 10.7 FL (ref 8.9–12.9)
POTASSIUM SERPL-SCNC: 4.4 MMOL/L (ref 3.5–5.1)
RBC # BLD AUTO: 4.66 M/UL (ref 4.1–5.7)
RBC MORPH BLD: NORMAL
SODIUM SERPL-SCNC: 139 MMOL/L (ref 136–145)
WBC # BLD AUTO: 7.2 K/UL (ref 4.1–11.1)

## 2021-03-05 PROCEDURE — 83036 HEMOGLOBIN GLYCOSYLATED A1C: CPT

## 2021-03-05 PROCEDURE — 36415 COLL VENOUS BLD VENIPUNCTURE: CPT

## 2021-03-05 PROCEDURE — 80048 BASIC METABOLIC PNL TOTAL CA: CPT

## 2021-03-05 PROCEDURE — 85025 COMPLETE CBC W/AUTO DIFF WBC: CPT

## 2021-03-05 RX ORDER — MELATONIN 5 MG
5 CAPSULE ORAL
COMMUNITY
End: 2021-03-15

## 2021-03-05 NOTE — PERIOP NOTES
Preoperative instructions reviewed with patient. Patient given SSI infection FAQS sheet. Given two bottles of skin prep chlorhexidine soap; given written and verbal instructions on use. Patient was given the opportunity to ask questions on the information provided. Written information on COVID19 testing prior to surgery given to patient and discussed. Information on where to report day of surgery also given to patient and discussed. Map of COVID testing site provided to patient. Surgical consent obtained and signed by patient.

## 2021-03-10 DIAGNOSIS — E11.42 CONTROLLED TYPE 2 DIABETES MELLITUS WITH DIABETIC POLYNEUROPATHY, WITHOUT LONG-TERM CURRENT USE OF INSULIN (HCC): Primary | ICD-10-CM

## 2021-03-10 DIAGNOSIS — Z11.59 NEED FOR HEPATITIS C SCREENING TEST: ICD-10-CM

## 2021-03-15 ENCOUNTER — OFFICE VISIT (OUTPATIENT)
Dept: INTERNAL MEDICINE CLINIC | Age: 81
End: 2021-03-15
Payer: MEDICARE

## 2021-03-15 VITALS
SYSTOLIC BLOOD PRESSURE: 110 MMHG | BODY MASS INDEX: 29.93 KG/M2 | RESPIRATION RATE: 20 BRPM | WEIGHT: 221 LBS | DIASTOLIC BLOOD PRESSURE: 67 MMHG | HEIGHT: 72 IN | OXYGEN SATURATION: 98 % | TEMPERATURE: 97.8 F | HEART RATE: 66 BPM

## 2021-03-15 DIAGNOSIS — I10 ESSENTIAL HYPERTENSION, BENIGN: ICD-10-CM

## 2021-03-15 DIAGNOSIS — E78.00 PURE HYPERCHOLESTEROLEMIA: ICD-10-CM

## 2021-03-15 DIAGNOSIS — Z00.00 MEDICARE ANNUAL WELLNESS VISIT, SUBSEQUENT: Primary | ICD-10-CM

## 2021-03-15 DIAGNOSIS — E11.42 CONTROLLED TYPE 2 DIABETES MELLITUS WITH DIABETIC POLYNEUROPATHY, WITHOUT LONG-TERM CURRENT USE OF INSULIN (HCC): ICD-10-CM

## 2021-03-15 DIAGNOSIS — C02.9 SQUAMOUS CELL CARCINOMA OF TONGUE (HCC): ICD-10-CM

## 2021-03-15 DIAGNOSIS — K51.911 ULCERATIVE COLITIS WITH RECTAL BLEEDING, UNSPECIFIED LOCATION (HCC): ICD-10-CM

## 2021-03-15 DIAGNOSIS — F33.42 RECURRENT MAJOR DEPRESSIVE DISORDER, IN FULL REMISSION (HCC): ICD-10-CM

## 2021-03-15 DIAGNOSIS — I25.10 CORONARY ARTERY DISEASE INVOLVING NATIVE CORONARY ARTERY OF NATIVE HEART WITHOUT ANGINA PECTORIS: ICD-10-CM

## 2021-03-15 DIAGNOSIS — Z71.89 ADVANCED CARE PLANNING/COUNSELING DISCUSSION: ICD-10-CM

## 2021-03-15 PROCEDURE — G8427 DOCREV CUR MEDS BY ELIG CLIN: HCPCS | Performed by: INTERNAL MEDICINE

## 2021-03-15 PROCEDURE — G8419 CALC BMI OUT NRM PARAM NOF/U: HCPCS | Performed by: INTERNAL MEDICINE

## 2021-03-15 PROCEDURE — G0439 PPPS, SUBSEQ VISIT: HCPCS | Performed by: INTERNAL MEDICINE

## 2021-03-15 PROCEDURE — G8536 NO DOC ELDER MAL SCRN: HCPCS | Performed by: INTERNAL MEDICINE

## 2021-03-15 PROCEDURE — G8752 SYS BP LESS 140: HCPCS | Performed by: INTERNAL MEDICINE

## 2021-03-15 PROCEDURE — G0463 HOSPITAL OUTPT CLINIC VISIT: HCPCS | Performed by: INTERNAL MEDICINE

## 2021-03-15 PROCEDURE — G8754 DIAS BP LESS 90: HCPCS | Performed by: INTERNAL MEDICINE

## 2021-03-15 PROCEDURE — 1101F PT FALLS ASSESS-DOCD LE1/YR: CPT | Performed by: INTERNAL MEDICINE

## 2021-03-15 PROCEDURE — G9717 DOC PT DX DEP/BP F/U NT REQ: HCPCS | Performed by: INTERNAL MEDICINE

## 2021-03-15 PROCEDURE — 99214 OFFICE O/P EST MOD 30 MIN: CPT | Performed by: INTERNAL MEDICINE

## 2021-03-15 RX ORDER — LISINOPRIL 5 MG/1
5 TABLET ORAL
Qty: 90 TAB | Refills: 1 | Status: SHIPPED | OUTPATIENT
Start: 2021-03-15 | End: 2021-09-22

## 2021-03-15 RX ORDER — SIMVASTATIN 40 MG/1
TABLET, FILM COATED ORAL
Qty: 90 TAB | Refills: 1 | Status: SHIPPED | OUTPATIENT
Start: 2021-03-15 | End: 2021-08-23

## 2021-03-15 RX ORDER — MESALAMINE 1.2 G/1
2.4 TABLET, DELAYED RELEASE ORAL 3 TIMES DAILY
COMMUNITY
End: 2021-03-15 | Stop reason: DRUGHIGH

## 2021-03-15 RX ORDER — METOPROLOL TARTRATE 25 MG/1
TABLET, FILM COATED ORAL
Qty: 90 TAB | Refills: 1 | Status: SHIPPED | OUTPATIENT
Start: 2021-03-15 | End: 2022-01-11

## 2021-03-15 RX ORDER — MESALAMINE 800 MG/1
1600 TABLET, DELAYED RELEASE ORAL 2 TIMES DAILY
COMMUNITY
End: 2021-09-15 | Stop reason: ALTCHOICE

## 2021-03-15 RX ORDER — GABAPENTIN 100 MG/1
100 CAPSULE ORAL 3 TIMES DAILY
Qty: 270 CAP | Refills: 1 | Status: SHIPPED | OUTPATIENT
Start: 2021-03-15 | End: 2021-09-15 | Stop reason: SDUPTHER

## 2021-03-15 RX ORDER — METFORMIN HYDROCHLORIDE 500 MG/1
TABLET, EXTENDED RELEASE ORAL
Qty: 270 TAB | Refills: 1 | Status: SHIPPED | OUTPATIENT
Start: 2021-03-15 | End: 2021-09-15 | Stop reason: SDUPTHER

## 2021-03-15 RX ORDER — SERTRALINE HYDROCHLORIDE 50 MG/1
TABLET, FILM COATED ORAL
Qty: 135 TAB | Refills: 1 | Status: SHIPPED | OUTPATIENT
Start: 2021-03-15 | End: 2021-06-21 | Stop reason: SDUPTHER

## 2021-03-15 NOTE — ASSESSMENT & PLAN NOTE
stable and well controlled, I reviewed treatment options with him, I reviewed life style changes to help improve mood, continue current treatment.

## 2021-03-15 NOTE — ASSESSMENT & PLAN NOTE
well controlled, home glucose monitoring emphasized, long term diabetic complications discussed, reviewed following up with specialists and/or referrals placed below and continue current plan.  reviewed, will continue w/ same dose of Neurontin.

## 2021-03-15 NOTE — ASSESSMENT & PLAN NOTE
well controlled, continue current treatment, has been getting worse over the last few years but still wnl

## 2021-03-15 NOTE — ACP (ADVANCE CARE PLANNING)
Advance Care Planning     Advance Care Planning (ACP) Physician/NP/PA (Provider) Conversation    Date of ACP Conversation: 03/15/21  Persons included in Conversation:  patient  Length of ACP Conversation in minutes: <16 minutes (Non-Billable)    Authorized Decision Maker (if patient is incapable of making informed decisions):   Named in Advance Directive or Healthcare Power of         He has an advanced directive - a copy has been provided & still reflects him wishes. Reviewed DNR/DNI and patient is not interested.          Steve Garcia MD

## 2021-03-15 NOTE — PATIENT INSTRUCTIONS
Medicare Wellness Visit, Male The best way to live healthy is to have a lifestyle where you eat a well-balanced diet, exercise regularly, limit alcohol use, and quit all forms of tobacco/nicotine, if applicable. Regular preventive services are another way to keep healthy. Preventive services (vaccines, screening tests, monitoring & exams) can help personalize your care plan, which helps you manage your own care. Screening tests can find health problems at the earliest stages, when they are easiest to treat. [facility] follows the current, evidence-based guidelines published by the [facility] ([facility]) when recommending preventive services for our patients. Because we follow these guidelines, sometimes recommendations change over time as research supports it. (For example, a prostate screening blood test is no longer routinely recommended for men with no symptoms). Of course, you and your doctor may decide to screen more often for some diseases, based on your risk and co-morbidities (chronic disease you are already diagnosed with). Preventive services for you include: - Medicare offers their members a free annual wellness visit, which is time for you and your primary care provider to discuss and plan for your preventive service needs. Take advantage of this benefit every year! 
-All adults over age 72 should receive the recommended pneumonia vaccines. Current USPSTF guidelines recommend a series of two vaccines for the best pneumonia protection.  
-All adults should have a flu vaccine yearly and tetanus vaccine every 10 years. 
-All adults age 48 and older should receive the shingles vaccines (series of two vaccines).       
-All adults age 38-68 who are overweight should have a diabetes screening test once every three years.  
-Other screening tests & preventive services for persons with diabetes include: an eye exam to screen for diabetic retinopathy, a kidney function test, a foot exam, and stricter control over your cholesterol.  
-Cardiovascular screening for adults with routine risk involves an electrocardiogram (ECG) at intervals determined by the provider.  
-Colorectal cancer screening should be done for adults age 54-65 with no increased risk factors for colorectal cancer. There are a number of acceptable methods of screening for this type of cancer. Each test has its own benefits and drawbacks. Discuss with your provider what is most appropriate for you during your annual wellness visit. The different tests include: colonoscopy (considered the best screening method), a fecal occult blood test, a fecal DNA test, and sigmoidoscopy. 
-All adults born between [facility] should be screened once for Hepatitis C. 
-An Abdominal Aortic Aneurysm (AAA) Screening is recommended for men age 73-68 who has ever smoked in their lifetime. Here is a list of your current Health Maintenance items (your personalized list of preventive services) with a due date: 
Health Maintenance Due Topic Date Due  
 COVID-19 Vaccine (1) Never done

## 2021-03-15 NOTE — PROGRESS NOTES
Jose Rodriguez is a 79 y.o. male who was seen in clinic today (3/15/2021) for a full physical.          Assessment & Plan:     1. Medicare annual wellness visit, subsequent  2. Advanced care planning/counseling discussion  3. Controlled type 2 diabetes mellitus with diabetic polyneuropathy, without long-term current use of insulin ([facility] [address]  Assessment & Plan:  well controlled, home glucose monitoring emphasized, long term diabetic complications discussed, reviewed following up with specialists and/or referrals placed below and continue current plan.  reviewed, will continue w/ same dose of Neurontin. Orders:  -     empagliflozin (Jardiance) 10 mg tablet; TAKE 1 TABLET BY MOUTH EVERY DAY, Normal, Disp-90 Tab, R-0  -     gabapentin (NEURONTIN) 100 mg capsule; Take 1 Cap by mouth three (3) times daily. Max Daily Amount: 300 mg. TAKE 1 CAPSULE BY MOUTH THREE TIMES DAILY, Normal, Disp-270 Cap, R-1  -     lisinopriL (PRINIVIL, ZESTRIL) 5 mg tablet; Take 1 Tab by mouth nightly., Normal, Disp-90 Tab, R-1This prescription was filled on 09/19/2020. Any refills authorized will be placed on file. -     metFORMIN ER (GLUCOPHAGE XR) 500 mg tablet; TAKE 1 TABLET BY MOUTH EVERY MORNING & TAKE 2 TABLETS BY MOUTH DAILY IN THE EVENING, Normal, Disp-270 Tab, R-1  -     simvastatin (ZOCOR) 40 mg tablet; TAKE 1 TABLET BY MOUTH AT BEDTIME, Normal, Disp-90 Tab, R-1  4. Coronary artery disease involving native coronary artery of native heart without angina pectoris  -     lisinopriL (PRINIVIL, ZESTRIL) 5 mg tablet; Take 1 Tab by mouth nightly., Normal, Disp-90 Tab, R-1This prescription was filled on 09/19/2020. Any refills authorized will be placed on file. -     metoprolol tartrate (LOPRESSOR) 25 mg tablet; TAKE 1/2 TABLET BY MOUTH TWICE DAILY, Normal, Disp-90 Tab, R-1  5. Essential hypertension, benign  Assessment & Plan:  well controlled, continue current plan   Orders:  -     lisinopriL (PRINIVIL, ZESTRIL) 5 mg tablet;  Take 1 Tab by mouth nightly., Normal, Disp-90 Tab, R-1This prescription was filled on 09/19/2020. Any refills authorized will be placed on file. 6. Pure hypercholesterolemia  Assessment & Plan:  well controlled, continue current treatment, has been getting worse over the last few years but still wnl  7. Recurrent major depressive disorder, in full remission [facility])  Assessment & Plan:  stable and well controlled, I reviewed treatment options with him, I reviewed life style changes to help improve mood, continue current treatment. Orders:  -     sertraline (ZOLOFT) 50 mg tablet; TAKE 1 AND 1/2 TABLETS BY MOUTH EVERY DAY, Normal, Disp-135 Tab, R-1This prescription was filled on 08/21/2020. Any refills authorized will be placed on file. 8. Ulcerative colitis with rectal bleeding, unspecified location [facility])  Assessment & Plan: This condition is managed by Specialist.  9. Squamous cell carcinoma of tongue ([facility] [address]  Assessment & Plan:  Monitored by specialist.  New dx since last visit, has a new spot and surgery scheduled    Follow-up and Dispositions    · Return in about 6 months (around 9/15/2021), or if symptoms worsen or fail to improve. Subjective:   Bailey Santiago is here today for a full physical.      Annual Wellness Visit- Subsequent Visit    Since last visit: diagnosed with SCC of the tongue. Underwent partial glossectomy on 11/9/20. He is scheduled for partial glossectomy on 3/22 with Dr Gerardo. The following acute and/or chronic problems were addressed today:    Endocrine Review  He is seen for diabetes with neuropathy. Testing: is performed sporadically, fasting minimum reading is 86, maximum reading is 146, and average reading is 110's, patient did not bring glucose log to this visit. He reports medication compliance: compliant all of the time. Medication side effects: none. Diabetic diet compliance: compliant all of the time. Lab review: labs reviewed and discussed with patient.    reviewed, Gabapentin last refilled on 1/21 - #180. He is taking one tab three times per day. Cardiovascular Review  The patient has CAD, hypertension and hyperlipidemia. He reports taking medications as instructed, no medication side effects noted, patient does not perform home BP monitoring. Diet and Lifestyle: generally follows a low fat low cholesterol diet, generally follows a low sodium diet. Labs: reviewed and discussed with patient. Mental Health Review  Patient is seen for depression. PHQ9 reviewed. He reports feeling good. He reports cancer diagnosis is weighing on him. Reports experiences the following side effects from the treatment: none. End of Life Planning: This was discussed with him today and he has an advanced directive - a copy has been provided. Reviewed DNR/DNI and patient is not interested. Depression Screen:  3 most recent PHQ Screens 3/15/2021   PHQ Not Done -   Little interest or pleasure in doing things Not at all   Feeling down, depressed, irritable, or hopeless Several days   Total Score PHQ 2 1   Trouble falling or staying asleep, or sleeping too much Not at all   Feeling tired or having little energy Not at all   Poor appetite, weight loss, or overeating Not at all   Feeling bad about yourself - or that you are a failure or have let yourself or your family down Not at all   Trouble concentrating on things such as school, work, reading, or watching TV Not at all   Moving or speaking so slowly that other people could have noticed; or the opposite being so fidgety that others notice Not at all   Thoughts of being better off dead, or hurting yourself in some way Not at all   PHQ 9 Score 1   How difficult have these problems made it for you to do your work, take care of your home and get along with others Not difficult at all         Fall Risk:   Fall Risk Assessment, last 12 mths 3/15/2021   Able to walk? Yes   Fall in past 12 months? 0   Do you feel unsteady?  1   Are you worried about falling 0   Is TUG test greater than 12 seconds? 0   Is the gait abnormal? 0   Number of falls in past 12 months 0   Fall with injury? 0       Abuse Screen:  Abuse Screening Questionnaire 3/15/2021   Do you ever feel afraid of your partner? N   Are you in a relationship with someone who physically or mentally threatens you? N   Is it safe for you to go home? Y         Do you average more than 1 drink per night or more than 7 drinks a week: No    In the past three months have you have had more than 4 drinks containing alcohol on one occasion: No    Health Maintenance:   Daily Aspirin: yes  AAA Screening: done - CT scan on 10/21/15    Immunizations:    Influenza: up to date   Tetanus: up to date   Shingles: he reports getting 2nd shot on 11/16 ([place])     Pneumonia: up to date  Cancer screening:   Prostate: guidelines reviewed, n/a  Colon: guidelines reviewed, up to date    Functional Ability and Level of Safety:    Hearing: The patient wears hearing aids. Cognition Screen:   Has your family/caregiver stated any concerns about your memory: no  Cognitive Screening: Normal - Mini Cog Test     Ambulation: with no difficulty     Activities of Daily Living: The home contains: handrails and grab bars  Patient does total self care  Exercise: moderately active    Adult Nutrition Screen:  No risk factors noted. Patient Care Team:  Cristina Hightower MD as PCP - General (Internal Medicine)  Cristina Hightower MD as PCP - [facility] Empaneled Provider  Burke Gandhi MD (Ophthalmology)  Jaret Aggarwal MD as Physician (Urology)  Jillian Hidalgo DPM (Podiatry)  Xochitl Lynne MD (Pulmonary Disease)  Rl Guerrero MD (Gastroenterology)  Ema Plummer MD (Gastroenterology)       The following sections were reviewed & updated as appropriate: Problem List, Allergies, PMH, PSH, FH, and SH. Prior to Admission medications    Medication Sig Start Date End Date Taking?  Authorizing Provider cholecalciferol, vitamin D3, (VITAMIN D3 PO) Take 2,000 Units by mouth daily. Yes Provider, Historical   mesalamine DR (ASACOL HD) 800 mg DR tablet Take 1,600 mg by mouth two (2) times a day. Yes Provider, Historical   empagliflozin (Jardiance) 10 mg tablet TAKE 1 TABLET BY MOUTH EVERY DAY  Patient taking differently: 10 mg daily. TAKE 1 TABLET BY MOUTH EVERY DAY 3/2/21  Yes Tree Puckett MD   metoprolol tartrate (LOPRESSOR) 25 mg tablet TAKE 1/2 TABLET BY MOUTH TWICE DAILY  Patient taking differently: Take 12.5 mg by mouth two (2) times a day. TAKE 1/2 TABLET BY MOUTH TWICE DAILY 2/21/21  Yes Tree Puckett MD   gabapentin (NEURONTIN) 100 mg capsule TAKE 1 CAPSULE BY MOUTH THREE TIMES DAILY  Patient taking differently: Take 100 mg by mouth three (3) times daily. TAKE 1 CAPSULE BY MOUTH THREE TIMES DAILY 12/18/20  Yes Tree Puckett MD   metFORMIN ER (GLUCOPHAGE XR) 500 mg tablet TAKE 1 TABLET BY MOUTH EVERY MORNING & TAKE 2 TABLETS BY MOUTH DAILY IN THE EVENING  Patient taking differently: Take 500 mg by mouth three (3) times daily. TAKE 1 TABLET BY MOUTH EVERY MORNING & TAKE 2 TABLETS BY MOUTH DAILY IN THE EVENING 10/7/20  Yes Tree Puckett MD   lisinopriL (PRINIVIL, ZESTRIL) 5 mg tablet TAKE 1 TABLET BY MOUTH EVERY DAY  Patient taking differently: Take 5 mg by mouth nightly. 10/4/20  Yes Tree Puckett MD   sertraline (ZOLOFT) 50 mg tablet TAKE 1 AND 1/2 TABLETS BY MOUTH EVERY DAY  Patient taking differently: Take 75 mg by mouth daily. TAKE 1 AND 1/2 TABLETS BY MOUTH EVERY DAY 9/8/20  Yes Tree Puckett MD   simvastatin (ZOCOR) 40 mg tablet TAKE 1 TABLET BY MOUTH AT BEDTIME  Patient taking differently: Take 40 mg by mouth nightly. TAKE 1 TABLET BY MOUTH AT BEDTIME 9/7/20  Yes Tree Puckett MD   glucose blood VI test strips (True Metrix Glucose Test Strip) strip Test blood sugars daily.   DX: E11.40, E11.65 3/13/20  Yes Tree Puckett MD   lancets misc Use daily as directed DX: E11.49 3/14/19  Yes Sebastien Hoover MD   Blood-Glucose Meter (TRUE METRIX GLUCOSE METER) misc Use to test blood sugars daily. DX:  E11.49 3/13/19  Yes Sebastien Hoover MD   MULTIVITAMIN PO Take 1 Tab by mouth daily. Yes Provider, Historical   cholecalciferol, vitamin D3, (VITAMIN D3) 2,000 unit tab Take 2,000 Units by mouth daily. Yes Provider, Historical   aspirin 81 mg tablet Take 81 mg by mouth nightly. Indications: myocardial infarction prevention   Yes Provider, Historical   mesalamine (LIALDA) 1.2 gram delayed release tablet Take 2.4 g by mouth three (3) times daily. 3/15/21  Provider, Historical   vitB6/mag cit,ox/potassium cit (THERALITH XR PO) Take 2 Caps by mouth two (2) times a day. Provider, Historical   melatonin 5 mg cap capsule Take 5 mg by mouth nightly. 3/15/21  Provider, Historical   mesalamine (LIALDA) 1.2 gram delayed release tablet Take 2.4 g by mouth two (2) times a day. 3/15/21  Provider, Historical   varicella-zoster recombinant, PF, (Shingrix, PF,) 50 mcg/0.5 mL susr injection 0.5 ml IM once and then repeat in 2-6 months. 9/14/20   Sebastien Hoover MD          Review of Systems   Constitutional: Negative for chills and fever. Respiratory: Negative for cough and shortness of breath. Cardiovascular: Negative for chest pain and palpitations. Gastrointestinal: Negative for abdominal pain, blood in stool, constipation, diarrhea, heartburn, nausea and vomiting. Genitourinary:        He denies: nocturia, urinary hesitancy, urinary frequency, weak stream.   Musculoskeletal: Negative for joint pain and myalgias. Neurological: Positive for tingling. Negative for focal weakness and headaches. Endo/Heme/Allergies: Does not bruise/bleed easily. Psychiatric/Behavioral: Negative for depression. The patient is not nervous/anxious and does not have insomnia.           Objective:   Physical Exam  Constitutional:       General: He is not in acute distress. Appearance: Normal appearance. Eyes:      Conjunctiva/sclera: Conjunctivae normal.   Cardiovascular:      Rate and Rhythm: Regular rhythm. Heart sounds: No murmur. Pulmonary:      Effort: Pulmonary effort is normal.      Breath sounds: Normal breath sounds. No decreased breath sounds or wheezing. Abdominal:      General: Bowel sounds are normal.      Palpations: Abdomen is soft. Tenderness: There is no abdominal tenderness. Musculoskeletal:      Right lower leg: No edema. Left lower leg: No edema.    Neurological:      Comments:   Left Foot:   Visual Exam: normal    Pulse DP: 1+ (weak)   Filament test: normal sensation   Right Foot:   Visual Exam: normal    Pulse DP: 1+ (weak)   Filament test: normal sensation    Psychiatric:         Mood and Affect: Mood and affect normal.         Behavior: Behavior normal.            Visit Vitals  /67   Pulse 66   Temp 97.8 °F (36.6 °C) (Temporal)   Resp 20   Ht 6' 0.1\" (1.831 m)   Wt 221 lb (100.2 kg)   SpO2 98%   BMI 29.89 kg/m²         Goldie Guerra MD

## 2021-03-18 ENCOUNTER — TRANSCRIBE ORDER (OUTPATIENT)
Dept: REGISTRATION | Age: 81
End: 2021-03-18

## 2021-03-18 ENCOUNTER — HOSPITAL ENCOUNTER (OUTPATIENT)
Dept: PREADMISSION TESTING | Age: 81
Discharge: HOME | End: 2021-03-18
Payer: MEDICARE

## 2021-03-18 DIAGNOSIS — Z01.812 PRE-PROCEDURE LAB EXAM: ICD-10-CM

## 2021-03-18 DIAGNOSIS — Z01.812 PRE-PROCEDURE LAB EXAM: Primary | ICD-10-CM

## 2021-03-18 PROCEDURE — U0003 INFECTIOUS AGENT DETECTION BY NUCLEIC ACID (DNA OR RNA); SEVERE ACUTE RESPIRATORY SYNDROME CORONAVIRUS 2 (SARS-COV-2) (CORONAVIRUS DISEASE [COVID-19]), AMPLIFIED PROBE TECHNIQUE, MAKING USE OF HIGH THROUGHPUT TECHNOLOGIES AS DESCRIBED BY CMS-2020-01-R: HCPCS

## 2021-03-19 LAB — SARS-COV-2, COV2NT: NOT DETECTED

## 2021-03-22 ENCOUNTER — ANESTHESIA EVENT (OUTPATIENT)
Dept: SURGERY | Age: 81
End: 2021-03-22
Payer: MEDICARE

## 2021-03-22 ENCOUNTER — HOSPITAL ENCOUNTER (OUTPATIENT)
Age: 81
Setting detail: OUTPATIENT SURGERY
Discharge: HOME | End: 2021-03-22
Attending: OTOLARYNGOLOGY | Admitting: OTOLARYNGOLOGY
Payer: MEDICARE

## 2021-03-22 ENCOUNTER — ANESTHESIA (OUTPATIENT)
Dept: SURGERY | Age: 81
End: 2021-03-22
Payer: MEDICARE

## 2021-03-22 VITALS
HEART RATE: 70 BPM | DIASTOLIC BLOOD PRESSURE: 77 MMHG | RESPIRATION RATE: 16 BRPM | TEMPERATURE: 97.8 F | BODY MASS INDEX: 30.43 KG/M2 | OXYGEN SATURATION: 95 % | SYSTOLIC BLOOD PRESSURE: 140 MMHG | WEIGHT: 225 LBS

## 2021-03-22 DIAGNOSIS — C02.9 SQUAMOUS CELL CARCINOMA OF TONGUE (HCC): Primary | ICD-10-CM

## 2021-03-22 LAB
GLUCOSE BLD STRIP.AUTO-MCNC: 138 MG/DL (ref 65–100)
GLUCOSE BLD STRIP.AUTO-MCNC: 143 MG/DL (ref 65–100)
SERVICE CMNT-IMP: ABNORMAL
SERVICE CMNT-IMP: ABNORMAL

## 2021-03-22 PROCEDURE — 74011250636 HC RX REV CODE- 250/636: Performed by: NURSE ANESTHETIST, CERTIFIED REGISTERED

## 2021-03-22 PROCEDURE — 82962 GLUCOSE BLOOD TEST: CPT

## 2021-03-22 PROCEDURE — 74011000250 HC RX REV CODE- 250: Performed by: ANESTHESIOLOGY

## 2021-03-22 PROCEDURE — 77030026438 HC STYL ET INTUB CARD -A: Performed by: NURSE ANESTHETIST, CERTIFIED REGISTERED

## 2021-03-22 PROCEDURE — 2709999900 HC NON-CHARGEABLE SUPPLY: Performed by: OTOLARYNGOLOGY

## 2021-03-22 PROCEDURE — 76010000161 HC OR TIME 1 TO 1.5 HR INTENSV-TIER 1: Performed by: OTOLARYNGOLOGY

## 2021-03-22 PROCEDURE — 88305 TISSUE EXAM BY PATHOLOGIST: CPT

## 2021-03-22 PROCEDURE — 88302 TISSUE EXAM BY PATHOLOGIST: CPT

## 2021-03-22 PROCEDURE — 77030011267 HC ELECTRD BLD COVD -A: Performed by: OTOLARYNGOLOGY

## 2021-03-22 PROCEDURE — 77030040922 HC BLNKT HYPOTHRM STRY -A

## 2021-03-22 PROCEDURE — 74011250637 HC RX REV CODE- 250/637: Performed by: NURSE ANESTHETIST, CERTIFIED REGISTERED

## 2021-03-22 PROCEDURE — 74011000250 HC RX REV CODE- 250: Performed by: NURSE ANESTHETIST, CERTIFIED REGISTERED

## 2021-03-22 PROCEDURE — 76210000021 HC REC RM PH II 0.5 TO 1 HR: Performed by: OTOLARYNGOLOGY

## 2021-03-22 PROCEDURE — 77030002888 HC SUT CHRMC J&J -A: Performed by: OTOLARYNGOLOGY

## 2021-03-22 PROCEDURE — 74011250636 HC RX REV CODE- 250/636: Performed by: ANESTHESIOLOGY

## 2021-03-22 PROCEDURE — 88331 PATH CONSLTJ SURG 1 BLK 1SPC: CPT

## 2021-03-22 PROCEDURE — 77030019908 HC STETH ESOPH SIMS -A: Performed by: NURSE ANESTHETIST, CERTIFIED REGISTERED

## 2021-03-22 PROCEDURE — 77030002996 HC SUT SLK J&J -A: Performed by: OTOLARYNGOLOGY

## 2021-03-22 PROCEDURE — 77030008462 HC STPLR SKN PROX J&J -A: Performed by: OTOLARYNGOLOGY

## 2021-03-22 PROCEDURE — 76210000006 HC OR PH I REC 0.5 TO 1 HR: Performed by: OTOLARYNGOLOGY

## 2021-03-22 PROCEDURE — 74011000250 HC RX REV CODE- 250: Performed by: OTOLARYNGOLOGY

## 2021-03-22 PROCEDURE — 74011250636 HC RX REV CODE- 250/636: Performed by: OTOLARYNGOLOGY

## 2021-03-22 PROCEDURE — 88309 TISSUE EXAM BY PATHOLOGIST: CPT

## 2021-03-22 PROCEDURE — 74011250637 HC RX REV CODE- 250/637: Performed by: ANESTHESIOLOGY

## 2021-03-22 PROCEDURE — 77030013629 HC ELECTRD NDL STRY -B: Performed by: OTOLARYNGOLOGY

## 2021-03-22 PROCEDURE — 77030008684 HC TU ET CUF COVD -B: Performed by: NURSE ANESTHETIST, CERTIFIED REGISTERED

## 2021-03-22 PROCEDURE — 77030040361 HC SLV COMPR DVT MDII -B: Performed by: OTOLARYNGOLOGY

## 2021-03-22 PROCEDURE — 76060000033 HC ANESTHESIA 1 TO 1.5 HR: Performed by: OTOLARYNGOLOGY

## 2021-03-22 RX ORDER — ROPIVACAINE HYDROCHLORIDE 5 MG/ML
30 INJECTION, SOLUTION EPIDURAL; INFILTRATION; PERINEURAL AS NEEDED
Status: DISCONTINUED | OUTPATIENT
Start: 2021-03-22 | End: 2021-03-22 | Stop reason: HOSPADM

## 2021-03-22 RX ORDER — OXYMETAZOLINE HCL 0.05 %
SPRAY, NON-AEROSOL (ML) NASAL AS NEEDED
Status: DISCONTINUED | OUTPATIENT
Start: 2021-03-22 | End: 2021-03-22 | Stop reason: HOSPADM

## 2021-03-22 RX ORDER — MIDAZOLAM HYDROCHLORIDE 1 MG/ML
INJECTION, SOLUTION INTRAMUSCULAR; INTRAVENOUS AS NEEDED
Status: DISCONTINUED | OUTPATIENT
Start: 2021-03-22 | End: 2021-03-22

## 2021-03-22 RX ORDER — PROPOFOL 10 MG/ML
INJECTION, EMULSION INTRAVENOUS AS NEEDED
Status: DISCONTINUED | OUTPATIENT
Start: 2021-03-22 | End: 2021-03-22 | Stop reason: HOSPADM

## 2021-03-22 RX ORDER — DEXMEDETOMIDINE HYDROCHLORIDE 100 UG/ML
INJECTION, SOLUTION INTRAVENOUS AS NEEDED
Status: DISCONTINUED | OUTPATIENT
Start: 2021-03-22 | End: 2021-03-22 | Stop reason: HOSPADM

## 2021-03-22 RX ORDER — ONDANSETRON 4 MG/1
4 TABLET, ORALLY DISINTEGRATING ORAL
Qty: 8 TAB | Refills: 1 | Status: SHIPPED | OUTPATIENT
Start: 2021-03-22 | End: 2021-09-15

## 2021-03-22 RX ORDER — ONDANSETRON 2 MG/ML
4 INJECTION INTRAMUSCULAR; INTRAVENOUS AS NEEDED
Status: DISCONTINUED | OUTPATIENT
Start: 2021-03-22 | End: 2021-03-22 | Stop reason: HOSPADM

## 2021-03-22 RX ORDER — HYDROCODONE BITARTRATE AND ACETAMINOPHEN 5; 325 MG/1; MG/1
1 TABLET ORAL
Qty: 15 TAB | Refills: 0 | Status: SHIPPED | OUTPATIENT
Start: 2021-03-22 | End: 2021-03-29

## 2021-03-22 RX ORDER — SODIUM CHLORIDE 0.9 % (FLUSH) 0.9 %
5-40 SYRINGE (ML) INJECTION AS NEEDED
Status: DISCONTINUED | OUTPATIENT
Start: 2021-03-22 | End: 2021-03-22 | Stop reason: HOSPADM

## 2021-03-22 RX ORDER — DIPHENHYDRAMINE HYDROCHLORIDE 50 MG/ML
12.5 INJECTION, SOLUTION INTRAMUSCULAR; INTRAVENOUS AS NEEDED
Status: DISCONTINUED | OUTPATIENT
Start: 2021-03-22 | End: 2021-03-22 | Stop reason: HOSPADM

## 2021-03-22 RX ORDER — KETAMINE HYDROCHLORIDE 10 MG/ML
INJECTION, SOLUTION INTRAMUSCULAR; INTRAVENOUS AS NEEDED
Status: DISCONTINUED | OUTPATIENT
Start: 2021-03-22 | End: 2021-03-22 | Stop reason: HOSPADM

## 2021-03-22 RX ORDER — PHENYLEPHRINE HCL IN 0.9% NACL 0.4MG/10ML
SYRINGE (ML) INTRAVENOUS
Status: DISCONTINUED | OUTPATIENT
Start: 2021-03-22 | End: 2021-03-22 | Stop reason: HOSPADM

## 2021-03-22 RX ORDER — FENTANYL CITRATE 50 UG/ML
50 INJECTION, SOLUTION INTRAMUSCULAR; INTRAVENOUS AS NEEDED
Status: DISCONTINUED | OUTPATIENT
Start: 2021-03-22 | End: 2021-03-22 | Stop reason: HOSPADM

## 2021-03-22 RX ORDER — FENTANYL CITRATE 50 UG/ML
25 INJECTION, SOLUTION INTRAMUSCULAR; INTRAVENOUS
Status: DISCONTINUED | OUTPATIENT
Start: 2021-03-22 | End: 2021-03-22 | Stop reason: HOSPADM

## 2021-03-22 RX ORDER — MIDAZOLAM HYDROCHLORIDE 1 MG/ML
0.5 INJECTION, SOLUTION INTRAMUSCULAR; INTRAVENOUS
Status: DISCONTINUED | OUTPATIENT
Start: 2021-03-22 | End: 2021-03-22 | Stop reason: HOSPADM

## 2021-03-22 RX ORDER — MIDAZOLAM HYDROCHLORIDE 1 MG/ML
1 INJECTION, SOLUTION INTRAMUSCULAR; INTRAVENOUS AS NEEDED
Status: DISCONTINUED | OUTPATIENT
Start: 2021-03-22 | End: 2021-03-22 | Stop reason: HOSPADM

## 2021-03-22 RX ORDER — SUCCINYLCHOLINE CHLORIDE 20 MG/ML
INJECTION INTRAMUSCULAR; INTRAVENOUS AS NEEDED
Status: DISCONTINUED | OUTPATIENT
Start: 2021-03-22 | End: 2021-03-22 | Stop reason: HOSPADM

## 2021-03-22 RX ORDER — ROCURONIUM BROMIDE 10 MG/ML
INJECTION, SOLUTION INTRAVENOUS AS NEEDED
Status: DISCONTINUED | OUTPATIENT
Start: 2021-03-22 | End: 2021-03-22 | Stop reason: HOSPADM

## 2021-03-22 RX ORDER — SODIUM CHLORIDE, SODIUM LACTATE, POTASSIUM CHLORIDE, CALCIUM CHLORIDE 600; 310; 30; 20 MG/100ML; MG/100ML; MG/100ML; MG/100ML
25 INJECTION, SOLUTION INTRAVENOUS CONTINUOUS
Status: DISCONTINUED | OUTPATIENT
Start: 2021-03-22 | End: 2021-03-22 | Stop reason: HOSPADM

## 2021-03-22 RX ORDER — CHLORHEXIDINE GLUCONATE 1.2 MG/ML
15 RINSE ORAL
Qty: 480 ML | Refills: 1 | Status: SHIPPED | OUTPATIENT
Start: 2021-03-22 | End: 2021-04-05

## 2021-03-22 RX ORDER — HYDROMORPHONE HYDROCHLORIDE 1 MG/ML
0.2 INJECTION, SOLUTION INTRAMUSCULAR; INTRAVENOUS; SUBCUTANEOUS
Status: DISCONTINUED | OUTPATIENT
Start: 2021-03-22 | End: 2021-03-22 | Stop reason: HOSPADM

## 2021-03-22 RX ORDER — SODIUM CHLORIDE 0.9 % (FLUSH) 0.9 %
5-40 SYRINGE (ML) INJECTION EVERY 8 HOURS
Status: DISCONTINUED | OUTPATIENT
Start: 2021-03-22 | End: 2021-03-22 | Stop reason: HOSPADM

## 2021-03-22 RX ORDER — ONDANSETRON 2 MG/ML
INJECTION INTRAMUSCULAR; INTRAVENOUS AS NEEDED
Status: DISCONTINUED | OUTPATIENT
Start: 2021-03-22 | End: 2021-03-22 | Stop reason: HOSPADM

## 2021-03-22 RX ORDER — MORPHINE SULFATE 2 MG/ML
2 INJECTION, SOLUTION INTRAMUSCULAR; INTRAVENOUS
Status: DISCONTINUED | OUTPATIENT
Start: 2021-03-22 | End: 2021-03-22 | Stop reason: HOSPADM

## 2021-03-22 RX ORDER — ACETAMINOPHEN 325 MG/1
650 TABLET ORAL ONCE
Status: COMPLETED | OUTPATIENT
Start: 2021-03-22 | End: 2021-03-22

## 2021-03-22 RX ORDER — LIDOCAINE HYDROCHLORIDE 20 MG/ML
INJECTION, SOLUTION EPIDURAL; INFILTRATION; INTRACAUDAL; PERINEURAL AS NEEDED
Status: DISCONTINUED | OUTPATIENT
Start: 2021-03-22 | End: 2021-03-22 | Stop reason: HOSPADM

## 2021-03-22 RX ORDER — DEXAMETHASONE SODIUM PHOSPHATE 4 MG/ML
INJECTION, SOLUTION INTRA-ARTICULAR; INTRALESIONAL; INTRAMUSCULAR; INTRAVENOUS; SOFT TISSUE AS NEEDED
Status: DISCONTINUED | OUTPATIENT
Start: 2021-03-22 | End: 2021-03-22 | Stop reason: HOSPADM

## 2021-03-22 RX ORDER — LIDOCAINE HYDROCHLORIDE 10 MG/ML
0.1 INJECTION, SOLUTION EPIDURAL; INFILTRATION; INTRACAUDAL; PERINEURAL AS NEEDED
Status: DISCONTINUED | OUTPATIENT
Start: 2021-03-22 | End: 2021-03-22 | Stop reason: HOSPADM

## 2021-03-22 RX ORDER — FENTANYL CITRATE 50 UG/ML
INJECTION, SOLUTION INTRAMUSCULAR; INTRAVENOUS AS NEEDED
Status: DISCONTINUED | OUTPATIENT
Start: 2021-03-22 | End: 2021-03-22 | Stop reason: HOSPADM

## 2021-03-22 RX ORDER — SODIUM CHLORIDE 9 MG/ML
25 INJECTION, SOLUTION INTRAVENOUS CONTINUOUS
Status: DISCONTINUED | OUTPATIENT
Start: 2021-03-22 | End: 2021-03-22 | Stop reason: HOSPADM

## 2021-03-22 RX ORDER — NALOXONE HYDROCHLORIDE 0.4 MG/ML
INJECTION, SOLUTION INTRAMUSCULAR; INTRAVENOUS; SUBCUTANEOUS AS NEEDED
Status: DISCONTINUED | OUTPATIENT
Start: 2021-03-22 | End: 2021-03-22 | Stop reason: HOSPADM

## 2021-03-22 RX ORDER — ESMOLOL HYDROCHLORIDE 10 MG/ML
INJECTION INTRAVENOUS AS NEEDED
Status: DISCONTINUED | OUTPATIENT
Start: 2021-03-22 | End: 2021-03-22 | Stop reason: HOSPADM

## 2021-03-22 RX ORDER — SODIUM CHLORIDE, SODIUM LACTATE, POTASSIUM CHLORIDE, CALCIUM CHLORIDE 600; 310; 30; 20 MG/100ML; MG/100ML; MG/100ML; MG/100ML
100 INJECTION, SOLUTION INTRAVENOUS CONTINUOUS
Status: DISCONTINUED | OUTPATIENT
Start: 2021-03-22 | End: 2021-03-22 | Stop reason: HOSPADM

## 2021-03-22 RX ORDER — GLYCOPYRROLATE 0.2 MG/ML
INJECTION INTRAMUSCULAR; INTRAVENOUS AS NEEDED
Status: DISCONTINUED | OUTPATIENT
Start: 2021-03-22 | End: 2021-03-22 | Stop reason: HOSPADM

## 2021-03-22 RX ORDER — PHENYLEPHRINE HCL IN 0.9% NACL 0.4MG/10ML
SYRINGE (ML) INTRAVENOUS AS NEEDED
Status: DISCONTINUED | OUTPATIENT
Start: 2021-03-22 | End: 2021-03-22 | Stop reason: HOSPADM

## 2021-03-22 RX ORDER — OXYCODONE HYDROCHLORIDE 5 MG/1
5 TABLET ORAL AS NEEDED
Status: DISCONTINUED | OUTPATIENT
Start: 2021-03-22 | End: 2021-03-22 | Stop reason: HOSPADM

## 2021-03-22 RX ADMIN — LIDOCAINE HYDROCHLORIDE 100 MG: 20 INJECTION, SOLUTION EPIDURAL; INFILTRATION; INTRACAUDAL; PERINEURAL at 07:37

## 2021-03-22 RX ADMIN — PROPOFOL 50 MG: 10 INJECTION, EMULSION INTRAVENOUS at 07:37

## 2021-03-22 RX ADMIN — ESMOLOL HYDROCHLORIDE 20 MG: 10 INJECTION, SOLUTION INTRAVENOUS at 07:58

## 2021-03-22 RX ADMIN — FENTANYL CITRATE 25 MCG: 50 INJECTION, SOLUTION INTRAMUSCULAR; INTRAVENOUS at 07:37

## 2021-03-22 RX ADMIN — FENTANYL CITRATE 25 MCG: 50 INJECTION, SOLUTION INTRAMUSCULAR; INTRAVENOUS at 07:30

## 2021-03-22 RX ADMIN — Medication 20 MCG/MIN: at 07:37

## 2021-03-22 RX ADMIN — FENTANYL CITRATE 25 MCG: 50 INJECTION, SOLUTION INTRAMUSCULAR; INTRAVENOUS at 07:50

## 2021-03-22 RX ADMIN — PROPOFOL 20 MG: 10 INJECTION, EMULSION INTRAVENOUS at 07:39

## 2021-03-22 RX ADMIN — ACETAMINOPHEN 650 MG: 325 TABLET ORAL at 06:18

## 2021-03-22 RX ADMIN — ROCURONIUM BROMIDE 10 MG: 10 SOLUTION INTRAVENOUS at 07:37

## 2021-03-22 RX ADMIN — SUCCINYLCHOLINE CHLORIDE 160 MG: 20 INJECTION, SOLUTION INTRAMUSCULAR; INTRAVENOUS at 07:38

## 2021-03-22 RX ADMIN — NALOXONE HYDROCHLORIDE 0.08 MG: 0.4 INJECTION, SOLUTION INTRAMUSCULAR; INTRAVENOUS; SUBCUTANEOUS at 08:17

## 2021-03-22 RX ADMIN — DEXAMETHASONE SODIUM PHOSPHATE 8 MG: 4 INJECTION, SOLUTION INTRAMUSCULAR; INTRAVENOUS at 07:45

## 2021-03-22 RX ADMIN — DEXMEDETOMIDINE HYDROCHLORIDE 10 MCG: 100 INJECTION, SOLUTION, CONCENTRATE INTRAVENOUS at 07:30

## 2021-03-22 RX ADMIN — GLYCOPYRROLATE 0.2 MG: 0.2 INJECTION, SOLUTION INTRAMUSCULAR; INTRAVENOUS at 07:30

## 2021-03-22 RX ADMIN — WATER 2 G: 1 INJECTION INTRAMUSCULAR; INTRAVENOUS; SUBCUTANEOUS at 07:45

## 2021-03-22 RX ADMIN — KETAMINE HYDROCHLORIDE 20 MG: 10 INJECTION, SOLUTION INTRAMUSCULAR; INTRAVENOUS at 07:37

## 2021-03-22 RX ADMIN — SODIUM CHLORIDE, POTASSIUM CHLORIDE, SODIUM LACTATE AND CALCIUM CHLORIDE 25 ML/HR: 600; 310; 30; 20 INJECTION, SOLUTION INTRAVENOUS at 06:29

## 2021-03-22 RX ADMIN — PROPOFOL 50 MG: 10 INJECTION, EMULSION INTRAVENOUS at 07:38

## 2021-03-22 RX ADMIN — ONDANSETRON HYDROCHLORIDE 4 MG: 2 INJECTION, SOLUTION INTRAMUSCULAR; INTRAVENOUS at 08:30

## 2021-03-22 RX ADMIN — Medication 80 MCG: at 07:38

## 2021-03-22 RX ADMIN — FENTANYL CITRATE 50 MCG: 50 INJECTION, SOLUTION INTRAMUSCULAR; INTRAVENOUS at 08:00

## 2021-03-22 RX ADMIN — OXYMETAZOLINE HCL 2 ML: 0.05 SPRAY NASAL at 07:45

## 2021-03-22 RX ADMIN — FENTANYL CITRATE 25 MCG: 50 INJECTION, SOLUTION INTRAMUSCULAR; INTRAVENOUS at 07:55

## 2021-03-22 NOTE — PERIOP NOTES
Discharge instructions given to the patient and his wife. Opportunity for questions. Verbalized understanding. Paper copy given for home.

## 2021-03-22 NOTE — OP NOTES
[facility]  OPERATIVE REPORT    Name:  Andreas Rubalcava  MR#:  [id number]  :  1940  ACCOUNT #:  [de-identified]  DATE OF SERVICE:  2021      PREOPERATIVE DIAGNOSIS:  Squamous cell carcinoma of the right lateral tongue. 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 PROCEDURE PERFORMED:  Right partial glossectomy. SURGEON:  Sandra Finley MD    ASSISTANT:  none    ANESTHESIA:  General endotracheal anesthesia. COMPLICATIONS:  None. SPECIMENS REMOVED:  Right partial glossectomy. IMPLANTS:  none. ESTIMATED BLOOD LOSS:  10 mL. INDICATIONS:  This is a 71-year-old gentleman who underwent right partial glossectomy for squamous cell carcinoma of the tongue in 2020. He had initially healed well. However, on recent followup, he was noted to have a new exophytic lesion along the incision line. Based on concern for recurrent carcinoma, he was scheduled for revision partial glossectomy. Risks of surgery discussed include bleeding, infection, pain, voice change, dysarthria, dysphagia, and need for further surgery. OPERATIVE DETAIL:  The patient was brought to the operating room and general endotracheal anesthesia was induced. The patient was then prepped and draped in usual fashion. Venora Maxon was placed to open the mouth. A 0 silk suture was placed in the midline of the tongue for retraction. The tongue was then carefully examined. There was an approximately 7-mm raised exophytic friable lesion along the posterior edge of the prior glossectomy scar. Along the anterior edge of the scar was some induration of approximately 2-3 mm. An elliptical incision was then designed taking a wide margin around the exophytic lesion but incorporating the entire prior scar. Mucosal incisions were made with needle-tip cautery. Needle-tip cautery was then used to continue dissecting out under the lesion eventually freeing it.   It was suture oriented and sent for frozen section. Margins returned negative. Cautery was used for hemostasis. 1% lidocaine with epinephrine 1:100,000 was injected for local.  The wound was then closed using 3-0 chromic sutures in an interrupted fashion. At this time, the procedure was terminated. The patient was awakened, extubated and taken to PACU in stable condition.       Kameron Huddleston MD      [id number]  D:  03/22/2021 8:41  T:  03/22/2021 9:31  JOB #:  [id number]

## 2021-03-22 NOTE — ANESTHESIA PREPROCEDURE EVALUATION
Relevant Problems   RESPIRATORY SYSTEM   (+) CARROL (obstructive sleep apnea)      NEUROLOGY   (+) Recurrent major depressive disorder (HCC)      CARDIOVASCULAR   (+) CAD (coronary artery disease)   (+) Essential hypertension, benign      RENAL FAILURE   (+) Kidney stone   (+) Pelvic kidney      PERSONAL HX & FAMILY HX OF CANCER   (+) Squamous cell carcinoma of tongue (HCC)       Anesthetic History   No history of anesthetic complications            Review of Systems / Medical History  Patient summary reviewed, nursing notes reviewed and pertinent labs reviewed    Pulmonary        Sleep apnea           Neuro/Psych         Psychiatric history     Cardiovascular    Hypertension          CAD and CABG    Exercise tolerance: >4 METS     GI/Hepatic/Renal           PUD    Comments: Ulcerative colitis Endo/Other    Diabetes         Other Findings              Physical Exam    Airway  Mallampati: III  TM Distance: > 6 cm  Neck ROM: normal range of motion   Mouth opening: Normal     Cardiovascular  Regular rate and rhythm,  S1 and S2 normal,  no murmur, click, rub, or gallop             Dental  No notable dental hx       Pulmonary  Breath sounds clear to auscultation               Abdominal  GI exam deferred       Other Findings            Anesthetic Plan    ASA: 3  Anesthesia type: general          Induction: Intravenous  Anesthetic plan and risks discussed with: Patient

## 2021-03-22 NOTE — BRIEF OP NOTE
Brief Postoperative Note    Patient: Jose Rodriguez  YOB: 1940  MRN: [id number]    Date of Procedure: 3/22/2021     Pre-Op Diagnosis: Malignant neoplasm of lateral border of tongue ([address] [C02.1]    Post-Op Diagnosis: Same as preoperative diagnosis.       Procedure(s):  RIGHT  PARTIAL GLOSSECTOMY    Surgeon(s):  Mirna Gerardo MD    Surgical Assistant: Surg Asst-1: Ni Mendoza    Anesthesia: General     Estimated Blood Loss (mL): Minimal    Complications: None    Specimens:   ID Type Source Tests Collected by Time Destination   1 : RIGHT PARTIAL GLOSSECTOMY Frozen Section Tongue  Mirna Gerardo MD 3/22/2021 2358 Pathology   2 : ADDITIONAL VENTRAL MARGIN Fresh Mouth  Mirna Gerardo MD 3/22/2021 1375 Pathology        Implants: * No implants in log *    Drains: * No LDAs found *    Findings: frozen margins neg    Electronically Signed by Sandra Gerardo IV, MD on 3/22/2021 at 8:37 AM

## 2021-03-22 NOTE — ANESTHESIA POSTPROCEDURE EVALUATION
Post-Anesthesia Evaluation and Assessment    Patient: Rojas Kwon MRN: [id number]  SSN: [id number]    YOB: 1940  Age: 79 y.o. Sex: male      I have evaluated the patient and they are stable and ready for discharge from the PACU. Cardiovascular Function/Vital Signs  Visit Vitals  BP (!) 117/56   Pulse 74   Temp 36.6 °C (97.8 °F)   Resp 12   Wt 102.1 kg (225 lb)   SpO2 90%   BMI 30.43 kg/m²       Patient is status post General anesthesia for Procedure(s):  RIGHT  PARTIAL GLOSSECTOMY. Nausea/Vomiting: None    Postoperative hydration reviewed and adequate. Pain:  Pain Scale 1: Numeric (0 - 10) (03/22/21 0920)  Pain Intensity 1: 0(pt denies) (03/22/21 0920)   Managed    Neurological Status:   Neuro (WDL): Exceptions to WDL (03/22/21 0920)  Neuro  Neurologic State: Drowsy; Appropriate for age (03/22/21 0920)  Orientation Level: Oriented X4 (03/22/21 0920)  Cognition: Follows commands (03/22/21 0920)  Speech: Clear (03/22/21 0920)  LUE Motor Response: Purposeful (03/22/21 0920)  LLE Motor Response: Purposeful (03/22/21 0920)  RUE Motor Response: Purposeful (03/22/21 0920)  RLE Motor Response: Purposeful (03/22/21 0920)   At baseline    Mental Status, Level of Consciousness: Alert and  oriented to person, place, and time    Pulmonary Status:   O2 Device: Room air (03/22/21 0920)   Adequate oxygenation and airway patent    Complications related to anesthesia: None    Post-anesthesia assessment completed. No concerns    Signed By: Krystal Hurtado MD     March 22, 2021              Procedure(s):  RIGHT  PARTIAL GLOSSECTOMY. general    <BSHSIANPOST>    INITIAL Post-op Vital signs:   Vitals Value Taken Time   /56 03/22/21 0915   Temp 36.6 °C (97.8 °F) 03/22/21 0846   Pulse 73 03/22/21 0926   Resp 16 03/22/21 0926   SpO2 92 % 03/22/21 0926   Vitals shown include unvalidated device data.

## 2021-03-22 NOTE — ROUTINE PROCESS
Patient: Yoan Handy MRN: [id number]  SSN: [id number]   YOB: 1940  Age: 79 y.o. Sex: male     Patient is status post Procedure(s):  RIGHT  PARTIAL GLOSSECTOMY.     Surgeon(s) and Role:     * Stuart Gerardo MD - Primary    Local/Dose/Irrigation:  5ml 1% LIDO W/EPI                  Peripheral IV 03/22/21 Left Hand (Active)   Site Assessment Clean, dry, & intact 03/22/21 0625   Phlebitis Assessment 0 03/22/21 0625   Infiltration Assessment 0 03/22/21 0625   Dressing Status Clean, dry, & intact 03/22/21 0625   Dressing Type Transparent 03/22/21 0625   Hub Color/Line Status Pink 03/22/21 0113                           Dressing/Packing:       Splint/Cast:  ]    Other:

## 2021-03-22 NOTE — ADDENDUM NOTE
Addendum  created 03/22/21 0949 by Barb Pool CRNA    Intraprocedure Meds edited, Orders acknowledged in Narrator

## 2021-05-30 DIAGNOSIS — E11.42 CONTROLLED TYPE 2 DIABETES MELLITUS WITH DIABETIC POLYNEUROPATHY, WITHOUT LONG-TERM CURRENT USE OF INSULIN (HCC): ICD-10-CM

## 2021-06-17 ENCOUNTER — TRANSCRIBE ORDER (OUTPATIENT)
Dept: NEUROLOGY | Age: 81
End: 2021-06-17

## 2021-06-18 ENCOUNTER — TELEPHONE (OUTPATIENT)
Dept: INTERNAL MEDICINE CLINIC | Age: 81
End: 2021-06-18

## 2021-06-18 NOTE — TELEPHONE ENCOUNTER
Called patient's wife, on HIPPA, identified with 2 identifiers, advised refill available on Sertraline, call if any questions.

## 2021-06-21 DIAGNOSIS — F33.42 RECURRENT MAJOR DEPRESSIVE DISORDER, IN FULL REMISSION (HCC): ICD-10-CM

## 2021-06-21 RX ORDER — SERTRALINE HYDROCHLORIDE 50 MG/1
TABLET, FILM COATED ORAL
Qty: 135 TABLET | Refills: 1 | Status: SHIPPED | OUTPATIENT
Start: 2021-06-21 | End: 2022-03-15 | Stop reason: SDUPTHER

## 2021-06-21 NOTE — TELEPHONE ENCOUNTER
Fabi Calderón ([facility]) [phone] (H)     Refill on sertaline to mariah After evaluating the patient it has been determined they are at risk for postpartum hemorrhage.

## 2021-08-23 DIAGNOSIS — E11.42 CONTROLLED TYPE 2 DIABETES MELLITUS WITH DIABETIC POLYNEUROPATHY, WITHOUT LONG-TERM CURRENT USE OF INSULIN (HCC): ICD-10-CM

## 2021-08-23 RX ORDER — SIMVASTATIN 40 MG/1
TABLET, FILM COATED ORAL
Qty: 90 TABLET | Refills: 1 | Status: SHIPPED | OUTPATIENT
Start: 2021-08-23 | End: 2022-02-10

## 2021-09-12 DIAGNOSIS — E11.42 CONTROLLED TYPE 2 DIABETES MELLITUS WITH DIABETIC POLYNEUROPATHY, WITHOUT LONG-TERM CURRENT USE OF INSULIN (HCC): Primary | ICD-10-CM

## 2021-09-15 ENCOUNTER — OFFICE VISIT (OUTPATIENT)
Dept: INTERNAL MEDICINE CLINIC | Age: 81
End: 2021-09-15
Payer: MEDICARE

## 2021-09-15 VITALS
RESPIRATION RATE: 18 BRPM | HEIGHT: 72 IN | TEMPERATURE: 98 F | HEART RATE: 66 BPM | WEIGHT: 210.4 LBS | SYSTOLIC BLOOD PRESSURE: 105 MMHG | DIASTOLIC BLOOD PRESSURE: 59 MMHG | BODY MASS INDEX: 28.5 KG/M2 | OXYGEN SATURATION: 98 %

## 2021-09-15 DIAGNOSIS — E11.42 CONTROLLED TYPE 2 DIABETES MELLITUS WITH DIABETIC POLYNEUROPATHY, WITHOUT LONG-TERM CURRENT USE OF INSULIN (HCC): Primary | ICD-10-CM

## 2021-09-15 DIAGNOSIS — E78.00 PURE HYPERCHOLESTEROLEMIA: ICD-10-CM

## 2021-09-15 DIAGNOSIS — I25.10 CORONARY ARTERY DISEASE INVOLVING NATIVE CORONARY ARTERY OF NATIVE HEART WITHOUT ANGINA PECTORIS: ICD-10-CM

## 2021-09-15 DIAGNOSIS — I10 ESSENTIAL HYPERTENSION, BENIGN: ICD-10-CM

## 2021-09-15 DIAGNOSIS — F33.42 RECURRENT MAJOR DEPRESSIVE DISORDER, IN FULL REMISSION (HCC): ICD-10-CM

## 2021-09-15 DIAGNOSIS — C02.9 SQUAMOUS CELL CARCINOMA OF TONGUE (HCC): ICD-10-CM

## 2021-09-15 PROCEDURE — G8427 DOCREV CUR MEDS BY ELIG CLIN: HCPCS | Performed by: INTERNAL MEDICINE

## 2021-09-15 PROCEDURE — G0463 HOSPITAL OUTPT CLINIC VISIT: HCPCS | Performed by: INTERNAL MEDICINE

## 2021-09-15 PROCEDURE — G9717 DOC PT DX DEP/BP F/U NT REQ: HCPCS | Performed by: INTERNAL MEDICINE

## 2021-09-15 PROCEDURE — G8419 CALC BMI OUT NRM PARAM NOF/U: HCPCS | Performed by: INTERNAL MEDICINE

## 2021-09-15 PROCEDURE — 99214 OFFICE O/P EST MOD 30 MIN: CPT | Performed by: INTERNAL MEDICINE

## 2021-09-15 PROCEDURE — G8752 SYS BP LESS 140: HCPCS | Performed by: INTERNAL MEDICINE

## 2021-09-15 PROCEDURE — G8754 DIAS BP LESS 90: HCPCS | Performed by: INTERNAL MEDICINE

## 2021-09-15 PROCEDURE — G8536 NO DOC ELDER MAL SCRN: HCPCS | Performed by: INTERNAL MEDICINE

## 2021-09-15 PROCEDURE — 1101F PT FALLS ASSESS-DOCD LE1/YR: CPT | Performed by: INTERNAL MEDICINE

## 2021-09-15 RX ORDER — SERTRALINE HYDROCHLORIDE 50 MG/1
TABLET, FILM COATED ORAL
Qty: 135 TABLET | Refills: 1 | Status: CANCELLED | OUTPATIENT
Start: 2021-09-15

## 2021-09-15 RX ORDER — GABAPENTIN 100 MG/1
100 CAPSULE ORAL 3 TIMES DAILY
Qty: 270 CAPSULE | Refills: 1 | Status: SHIPPED | OUTPATIENT
Start: 2021-09-15 | End: 2022-03-15 | Stop reason: SDUPTHER

## 2021-09-15 RX ORDER — MESALAMINE 1.2 G/1
TABLET, DELAYED RELEASE ORAL
COMMUNITY

## 2021-09-15 RX ORDER — BUDESONIDE 3 MG/1
9 CAPSULE, COATED PELLETS ORAL DAILY
COMMUNITY
Start: 2021-07-29 | End: 2021-09-17

## 2021-09-15 RX ORDER — METFORMIN HYDROCHLORIDE 500 MG/1
TABLET, EXTENDED RELEASE ORAL
Qty: 270 TABLET | Refills: 1 | Status: SHIPPED | OUTPATIENT
Start: 2021-09-15 | End: 2022-03-15 | Stop reason: SDUPTHER

## 2021-09-15 RX ORDER — LISINOPRIL 5 MG/1
5 TABLET ORAL
Qty: 90 TABLET | Refills: 1 | Status: CANCELLED | OUTPATIENT
Start: 2021-09-15

## 2021-09-15 NOTE — PROGRESS NOTES
Agata Crawford is a 79 y.o. male who was seen in clinic today (9/15/2021). Assessment & Plan:   Below is the assessment and plan developed based on review of pertinent history, physical exam, labs, studies, and medications. 1. Controlled type 2 diabetes mellitus with diabetic polyneuropathy, without long-term current use of insulin ([address]  Assessment & Plan:  well controlled, continue current treatment    Orders:  -     metFORMIN ER (GLUCOPHAGE XR) 500 mg tablet; TAKE 1 TABLET BY MOUTH EVERY MORNING & TAKE 2 TABLETS BY MOUTH DAILY IN THE EVENING, Normal, Disp-270 Tablet, R-1  -     gabapentin (NEURONTIN) 100 mg capsule; Take 1 Capsule by mouth three (3) times daily. Max Daily Amount: 300 mg. TAKE 1 CAPSULE BY MOUTH THREE TIMES DAILY, Normal, Disp-270 Capsule, R-1  2. Coronary artery disease involving native coronary artery of native heart without angina pectoris  Assessment & Plan:  well controlled, asymptomatic. BP is to well contorlled, lipids are well controlled. Continue: current plan except will stop lisinopril. 3. Essential hypertension, benign  Assessment & Plan: To well controlled, has been a little low last 2 visits, will stop lisinopril and continue BB. 4. Pure hypercholesterolemia  Assessment & Plan:  well controlled, continue current treatment    5. Recurrent major depressive disorder, in full remission [facility])  Assessment & Plan:  stable and well controlled, I reviewed treatment options with him, I reviewed life style changes to help improve mood, continue current treatment. 6. Squamous cell carcinoma of tongue (HCC)  Assessment & Plan:   monitored by specialist. No acute findings meriting change in the plan    Follow-up and Dispositions    · Return in about 6 months (around 3/15/2022) for FULL PHYSICAL - 30 minutes.        Subjective/Objective:   Sowmya Alberts was seen today for Hypertension, Coronary Artery Disease, Diabetes, and Depression      Since last visit: weight has decreased    Endocrine Review  He is seen for diabetes with neuropathy. Testing: is performed sporadically, fasting minimum reading is 77, maximum reading is 132. He reports medication compliance: compliant all of the time. Medication side effects: none. Diabetic diet compliance: compliant all of the time. [facility] on [facility]/23 - #270. Had been taking 1 tab TID for most of '20. Filled on 1/21/21 for #180 tabs and not again until 6/23 - #270. Cardiovascular Review  The patient has CAD, hypertension and hyperlipidemia. He reports taking medications as instructed, no medication side effects noted, patient does not perform home BP monitoring. He generally follows a low fat low cholesterol diet, generally follows a low sodium diet, exercises regularly. Mental Health Review  Patient is seen for depression. PHQ9 reviewed. He has no concerns today. Reports experiences the following side effects from the treatment: none. Prior to Admission medications    Medication Sig Start Date End Date Taking? Authorizing Provider   budesonide (ENTOCORT EC) 3 mg capsule Take 9 mg by mouth daily. 7/29/21 9/17/21 Yes Provider, Historical   mesalamine (LIALDA) 1.2 gram delayed release tablet Take 2.4 g by mouth two (2) times a day. Yes Provider, Historical   simvastatin (ZOCOR) 40 mg tablet TAKE 1 TABLET BY MOUTH AT BEDTIME 8/23/21  Yes Gonzalo Gómez MD   empagliflozin (Jardiance) 10 mg tablet TAKE 1 TABLET BY MOUTH EVERY DAY 8/23/21  Yes Gonzalo Gómez MD   sertraline (ZOLOFT) 50 mg tablet TAKE 1 AND 1/2 TABLETS BY MOUTH EVERY DAY 6/21/21  Yes Gonzalo Gómez MD   cholecalciferol, vitamin D3, (VITAMIN D3 PO) Take 2,000 Units by mouth daily. Yes Provider, Historical   gabapentin (NEURONTIN) 100 mg capsule Take 1 Cap by mouth three (3) times daily. Max Daily Amount: 300 mg.  TAKE 1 CAPSULE BY MOUTH THREE TIMES DAILY 3/15/21  Yes Gonzalo Gómez MD lisinopriL (PRINIVIL, ZESTRIL) 5 mg tablet Take 1 Tab by mouth nightly. 3/15/21  Yes Shayla Hinton MD   metFORMIN ER (GLUCOPHAGE XR) 500 mg tablet TAKE 1 TABLET BY MOUTH EVERY MORNING & TAKE 2 TABLETS BY MOUTH DAILY IN THE EVENING 3/15/21  Yes Shayla Hinton MD   metoprolol tartrate (LOPRESSOR) 25 mg tablet TAKE 1/2 TABLET BY MOUTH TWICE DAILY 3/15/21  Yes Shayla Hinton MD   vitB6/mag cit,ox/potassium cit (THERALITH XR PO) Take 2 Caps by mouth two (2) times a day. Yes Provider, Historical   glucose blood VI test strips (True Metrix Glucose Test Strip) strip Test blood sugars daily. DX: E11.40, E11.65 3/13/20  Yes Shayla Hinton MD   lancets misc Use daily as directed DX: E11.49 3/14/19  Yes Shayla Hinton MD   Blood-Glucose Meter (TRUE METRIX GLUCOSE METER) misc Use to test blood sugars daily. DX:  E11.49 3/13/19  Yes Shayla Hinton MD   MULTIVITAMIN PO Take 1 Tab by mouth daily. Yes Provider, Historical   ondansetron (ZOFRAN ODT) 4 mg disintegrating tablet Take 1 Tab by mouth every eight (8) hours as needed for Nausea or Vomiting. Patient not taking: Reported on 9/15/2021 3/22/21 9/15/21  MD torin Rodriguez DR (ASACOL HD) 800 mg DR tablet Take 1,600 mg by mouth two (2) times a day. Patient not taking: Reported on 9/15/2021  9/15/21  Provider, Historical   varicella-zoster recombinant, PF, (Shingrix, PF,) 50 mcg/0.5 mL susr injection 0.5 ml IM once and then repeat in 2-6 months. Patient not taking: Reported on 9/15/2021 9/14/20 9/15/21  Shayla Hinton MD   cholecalciferol, vitamin D3, (VITAMIN D3) 2,000 unit tab Take 2,000 Units by mouth daily. Patient not taking: Reported on 9/15/2021  9/15/21  Provider, Historical        Review of Systems   Constitutional: Positive for weight loss. All other systems reviewed and are negative. Physical Exam  Constitutional:       General: He is not in acute distress.      Appearance: Normal appearance. Eyes:      Conjunctiva/sclera: Conjunctivae normal.   Cardiovascular:      Rate and Rhythm: Regular rhythm. Pulses:           Dorsalis pedis pulses are 1+ on the right side and 1+ on the left side. Heart sounds: No murmur heard. Pulmonary:      Effort: Pulmonary effort is normal.      Breath sounds: Normal breath sounds. No decreased breath sounds or wheezing. Abdominal:      General: Bowel sounds are normal.      Palpations: Abdomen is soft. Tenderness: There is no abdominal tenderness. Musculoskeletal:      Right lower leg: No edema. Left lower leg: No edema.    Feet:      Right foot:      Skin integrity: Skin integrity normal.      Toenail Condition: Right toenails are normal.      Left foot:      Skin integrity: Skin integrity normal.      Toenail Condition: Left toenails are normal.      Comments:        Left foot Filament test: normal sensation        Right foot Filament test: normal sensation   Psychiatric:         Mood and Affect: Mood and affect normal.         Behavior: Behavior normal.         Visit Vitals  BP (!) 105/59   Pulse 66   Temp 98 °F (36.7 °C) (Temporal)   Resp 18   Ht 6' (1.829 m)   Wt 210 lb 6.4 oz (95.4 kg)   SpO2 98%   BMI 28.54 kg/m²         Drea Delgado MD

## 2021-09-15 NOTE — ASSESSMENT & PLAN NOTE
well controlled, asymptomatic. BP is to well contorlled, lipids are well controlled. Continue: current plan except will stop lisinopril.

## 2021-09-22 DIAGNOSIS — I25.10 CORONARY ARTERY DISEASE INVOLVING NATIVE CORONARY ARTERY OF NATIVE HEART WITHOUT ANGINA PECTORIS: ICD-10-CM

## 2021-09-22 DIAGNOSIS — I10 ESSENTIAL HYPERTENSION, BENIGN: ICD-10-CM

## 2021-09-22 DIAGNOSIS — E11.42 CONTROLLED TYPE 2 DIABETES MELLITUS WITH DIABETIC POLYNEUROPATHY, WITHOUT LONG-TERM CURRENT USE OF INSULIN (HCC): ICD-10-CM

## 2021-09-22 RX ORDER — LISINOPRIL 5 MG/1
TABLET ORAL
Qty: 90 TABLET | Refills: 1 | Status: ON HOLD | OUTPATIENT
Start: 2021-09-22 | End: 2021-10-28

## 2021-10-25 ENCOUNTER — HOSPITAL ENCOUNTER (OUTPATIENT)
Dept: PREADMISSION TESTING | Age: 81
Discharge: HOME | End: 2021-10-25
Payer: MEDICARE

## 2021-10-25 ENCOUNTER — TRANSCRIBE ORDER (OUTPATIENT)
Dept: REGISTRATION | Age: 81
End: 2021-10-25

## 2021-10-25 DIAGNOSIS — Z01.812 PRE-PROCEDURE LAB EXAM: ICD-10-CM

## 2021-10-25 DIAGNOSIS — Z01.812 PRE-PROCEDURE LAB EXAM: Primary | ICD-10-CM

## 2021-10-25 PROCEDURE — U0005 INFEC AGEN DETEC AMPLI PROBE: HCPCS

## 2021-10-26 LAB
SARS-COV-2, XPLCVT: NOT DETECTED
SOURCE, COVRS: NORMAL

## 2021-10-28 ENCOUNTER — ANESTHESIA (OUTPATIENT)
Dept: ENDOSCOPY | Age: 81
End: 2021-10-28
Payer: MEDICARE

## 2021-10-28 ENCOUNTER — HOSPITAL ENCOUNTER (OUTPATIENT)
Age: 81
Setting detail: OUTPATIENT SURGERY
Discharge: HOME | End: 2021-10-28
Attending: INTERNAL MEDICINE | Admitting: INTERNAL MEDICINE
Payer: MEDICARE

## 2021-10-28 ENCOUNTER — ANESTHESIA EVENT (OUTPATIENT)
Dept: ENDOSCOPY | Age: 81
End: 2021-10-28
Payer: MEDICARE

## 2021-10-28 VITALS
HEART RATE: 61 BPM | DIASTOLIC BLOOD PRESSURE: 70 MMHG | WEIGHT: 210 LBS | RESPIRATION RATE: 19 BRPM | OXYGEN SATURATION: 98 % | TEMPERATURE: 98.6 F | HEIGHT: 72 IN | BODY MASS INDEX: 28.44 KG/M2 | SYSTOLIC BLOOD PRESSURE: 132 MMHG

## 2021-10-28 LAB
GLUCOSE BLD STRIP.AUTO-MCNC: 119 MG/DL (ref 65–117)
SERVICE CMNT-IMP: ABNORMAL

## 2021-10-28 PROCEDURE — 82962 GLUCOSE BLOOD TEST: CPT

## 2021-10-28 PROCEDURE — 88305 TISSUE EXAM BY PATHOLOGIST: CPT

## 2021-10-28 PROCEDURE — 77030021593 HC FCPS BIOP ENDOSC BSC -A: Performed by: INTERNAL MEDICINE

## 2021-10-28 PROCEDURE — 76060000032 HC ANESTHESIA 0.5 TO 1 HR: Performed by: INTERNAL MEDICINE

## 2021-10-28 PROCEDURE — 74011250636 HC RX REV CODE- 250/636: Performed by: NURSE ANESTHETIST, CERTIFIED REGISTERED

## 2021-10-28 PROCEDURE — 74011250637 HC RX REV CODE- 250/637: Performed by: INTERNAL MEDICINE

## 2021-10-28 PROCEDURE — 77030029384 HC SNR POLYP CAPTVR II BSC -B: Performed by: INTERNAL MEDICINE

## 2021-10-28 PROCEDURE — 2709999900 HC NON-CHARGEABLE SUPPLY: Performed by: INTERNAL MEDICINE

## 2021-10-28 PROCEDURE — 76040000007: Performed by: INTERNAL MEDICINE

## 2021-10-28 PROCEDURE — 74011000250 HC RX REV CODE- 250: Performed by: NURSE ANESTHETIST, CERTIFIED REGISTERED

## 2021-10-28 RX ORDER — SODIUM CHLORIDE 0.9 % (FLUSH) 0.9 %
5-40 SYRINGE (ML) INJECTION EVERY 8 HOURS
Status: DISCONTINUED | OUTPATIENT
Start: 2021-10-28 | End: 2021-10-28 | Stop reason: HOSPADM

## 2021-10-28 RX ORDER — DEXTROMETHORPHAN/PSEUDOEPHED 2.5-7.5/.8
1.2 DROPS ORAL
Status: DISCONTINUED | OUTPATIENT
Start: 2021-10-28 | End: 2021-10-28 | Stop reason: HOSPADM

## 2021-10-28 RX ORDER — SODIUM CHLORIDE 9 MG/ML
50 INJECTION, SOLUTION INTRAVENOUS CONTINUOUS
Status: DISCONTINUED | OUTPATIENT
Start: 2021-10-28 | End: 2021-10-28 | Stop reason: HOSPADM

## 2021-10-28 RX ORDER — SODIUM CHLORIDE 9 MG/ML
INJECTION, SOLUTION INTRAVENOUS
Status: DISCONTINUED | OUTPATIENT
Start: 2021-10-28 | End: 2021-10-28 | Stop reason: HOSPADM

## 2021-10-28 RX ORDER — NALOXONE HYDROCHLORIDE 0.4 MG/ML
0.4 INJECTION, SOLUTION INTRAMUSCULAR; INTRAVENOUS; SUBCUTANEOUS
Status: DISCONTINUED | OUTPATIENT
Start: 2021-10-28 | End: 2021-10-28 | Stop reason: HOSPADM

## 2021-10-28 RX ORDER — ATROPINE SULFATE 0.1 MG/ML
0.5 INJECTION INTRAVENOUS
Status: DISCONTINUED | OUTPATIENT
Start: 2021-10-28 | End: 2021-10-28 | Stop reason: HOSPADM

## 2021-10-28 RX ORDER — FLUMAZENIL 0.1 MG/ML
0.2 INJECTION INTRAVENOUS
Status: DISCONTINUED | OUTPATIENT
Start: 2021-10-28 | End: 2021-10-28 | Stop reason: HOSPADM

## 2021-10-28 RX ORDER — SODIUM CHLORIDE 0.9 % (FLUSH) 0.9 %
5-40 SYRINGE (ML) INJECTION AS NEEDED
Status: DISCONTINUED | OUTPATIENT
Start: 2021-10-28 | End: 2021-10-28 | Stop reason: HOSPADM

## 2021-10-28 RX ORDER — LIDOCAINE HYDROCHLORIDE 20 MG/ML
INJECTION, SOLUTION EPIDURAL; INFILTRATION; INTRACAUDAL; PERINEURAL AS NEEDED
Status: DISCONTINUED | OUTPATIENT
Start: 2021-10-28 | End: 2021-10-28 | Stop reason: HOSPADM

## 2021-10-28 RX ORDER — MIDAZOLAM HYDROCHLORIDE 1 MG/ML
.25-1 INJECTION, SOLUTION INTRAMUSCULAR; INTRAVENOUS
Status: DISCONTINUED | OUTPATIENT
Start: 2021-10-28 | End: 2021-10-28 | Stop reason: HOSPADM

## 2021-10-28 RX ORDER — FENTANYL CITRATE 50 UG/ML
100 INJECTION, SOLUTION INTRAMUSCULAR; INTRAVENOUS
Status: DISCONTINUED | OUTPATIENT
Start: 2021-10-28 | End: 2021-10-28 | Stop reason: HOSPADM

## 2021-10-28 RX ORDER — EPINEPHRINE 0.1 MG/ML
1 INJECTION INTRACARDIAC; INTRAVENOUS
Status: DISCONTINUED | OUTPATIENT
Start: 2021-10-28 | End: 2021-10-28 | Stop reason: HOSPADM

## 2021-10-28 RX ORDER — PROPOFOL 10 MG/ML
INJECTION, EMULSION INTRAVENOUS AS NEEDED
Status: DISCONTINUED | OUTPATIENT
Start: 2021-10-28 | End: 2021-10-28 | Stop reason: HOSPADM

## 2021-10-28 RX ADMIN — PROPOFOL 30 MG: 10 INJECTION, EMULSION INTRAVENOUS at 11:27

## 2021-10-28 RX ADMIN — PROPOFOL 70 MG: 10 INJECTION, EMULSION INTRAVENOUS at 11:26

## 2021-10-28 RX ADMIN — PROPOFOL 25 MG: 10 INJECTION, EMULSION INTRAVENOUS at 11:31

## 2021-10-28 RX ADMIN — PROPOFOL 25 MG: 10 INJECTION, EMULSION INTRAVENOUS at 11:29

## 2021-10-28 RX ADMIN — PROPOFOL 25 MG: 10 INJECTION, EMULSION INTRAVENOUS at 11:50

## 2021-10-28 RX ADMIN — LIDOCAINE HYDROCHLORIDE 50 MG: 20 INJECTION, SOLUTION EPIDURAL; INFILTRATION; INTRACAUDAL; PERINEURAL at 11:26

## 2021-10-28 RX ADMIN — SODIUM CHLORIDE: 900 INJECTION, SOLUTION INTRAVENOUS at 11:22

## 2021-10-28 RX ADMIN — PROPOFOL 25 MG: 10 INJECTION, EMULSION INTRAVENOUS at 11:34

## 2021-10-28 RX ADMIN — PROPOFOL 25 MG: 10 INJECTION, EMULSION INTRAVENOUS at 11:37

## 2021-10-28 RX ADMIN — PROPOFOL 25 MG: 10 INJECTION, EMULSION INTRAVENOUS at 11:42

## 2021-10-28 RX ADMIN — PROPOFOL 25 MG: 10 INJECTION, EMULSION INTRAVENOUS at 11:46

## 2021-10-28 NOTE — PROCEDURES
[address] [facility].  [address] [address] [address], [address]  [phone]                              Colonoscopy Procedure Note      Indications:  Ulcerative colitis (long standing), last colonoscopy 2020, diarrhea     :  Sharon Barrett MD    Staff: Endoscopy RN-1: Christian Rodriguez RN  Endoscopy RN-2: Vesta Delgado RN    Referring Provider: Bal Pineda MD    Sedation:  MAC anesthesia    Procedure Details:  After informed consent was obtained with all risks and benefits of procedure explained and preoperative exam completed, the patient was taken to the endoscopy suite and placed in the left lateral decubitus position. Upon sequential sedation as per above, a digital rectal exam was performed per below. The Olympus videocolonoscope was inserted in the rectum and carefully advanced to the terminal ileum. The quality of preparation was good. [place] Bowel Prep Score : 2/2/2. The colonoscope was slowly withdrawn with careful evaluation between folds. Retroflexion in the rectum was performed.      Findings:   Rectum: medium internal hemorrhoids, overall normal mucosa - biopsied  Sigmoid: mild erythema with decreased vascularity, biopsied, mild diverticulosis  Descending Colon: one 5 mm sessile polyp, removed with cold snare, mild erythema with decreased vascularity, biopsied  Transverse Colon: mild erythema with decreased vascularity, biopsied  Ascending Colon: mild erythema with decreased vascularity, biopsied  Cecum: small amount of pill debris  Terminal Ileum: normal    Interventions:  biopsies    Specimen Removed:    ID Type Source Tests Collected by Time Destination   1 : colon bx, right Preservative   Stevenson Araya MD 10/28/2021 1136 Pathology   2 : transverse colon bx Preservative Colon, Transverse  Stevenson Araya MD 10/28/2021 1140 Pathology   3 : colon bx, left  Preservative   Stevenson Araya MD 10/28/2021 1143 Pathology   4 : left colon polyp Preservative   Corbin Frances MD 10/28/2021 1145 Pathology   5 : rectal bx Preservative   Corbin Frances MD 10/28/2021 1149 Pathology       Complications: None. EBL:  Minimal     Impression:    See Postoperative diagnosis above    Recommendations:   - Await pathology. You should receive a letter within 2 weeks. - Resume normal medications. - Follow up in GI clinic. Discharge Disposition:  Home in the company of a  when able to ambulate.     Denise Diallo MD  10/28/2021  11:55 AM

## 2021-10-28 NOTE — ANESTHESIA PREPROCEDURE EVALUATION
Relevant Problems   RESPIRATORY SYSTEM   (+) CARROL (obstructive sleep apnea)      NEUROLOGY   (+) Recurrent major depressive disorder (HCC)      CARDIOVASCULAR   (+) CAD (coronary artery disease)   (+) Essential hypertension, benign      RENAL FAILURE   (+) Kidney stone   (+) Pelvic kidney      PERSONAL HX & FAMILY HX OF CANCER   (+) Squamous cell carcinoma of tongue (HCC)       Anesthetic History   No history of anesthetic complications            Review of Systems / Medical History  Patient summary reviewed, nursing notes reviewed and pertinent labs reviewed    Pulmonary        Sleep apnea           Neuro/Psych         Psychiatric history     Cardiovascular    Hypertension          CAD and CABG    Exercise tolerance: >4 METS     GI/Hepatic/Renal           PUD    Comments: Ulcerative colitis Endo/Other    Diabetes         Other Findings              Physical Exam    Airway  Mallampati: III  TM Distance: > 6 cm  Neck ROM: normal range of motion   Mouth opening: Normal     Cardiovascular  Regular rate and rhythm,  S1 and S2 normal,  no murmur, click, rub, or gallop             Dental  No notable dental hx       Pulmonary  Breath sounds clear to auscultation               Abdominal  GI exam deferred       Other Findings            Anesthetic Plan    ASA: 3  Anesthesia type: MAC          Induction: Intravenous  Anesthetic plan and risks discussed with: Patient

## 2021-10-28 NOTE — H&P
[address] [facility] [address]  [address] [facility] [address], [address]  [phone]                                History and Physical     NAME: Corinn Ahumada   :  1940   MRN:  [id number]     HPI:  The patient was seen and examined. Past Surgical History:   Procedure Laterality Date    COLONOSCOPY N/A 2017    active colitis on biopsy.   Performed by Brook Ma MD at [facility] ENDOSCOPY    COLONOSCOPY Left 2018    active colitis - performed by Brook Ma MD at [facility] ENDOSCOPY    COLONOSCOPY N/A 5/15/2020    colitis, no polyps - [facility] [address], COLON, DIAGNOSTIC  6/3/14    Dr. Saranya Moffett recommended q2 surveil    HX CATARACT REMOVAL  ,     both eyes, laser repair to left eye in     HX COLONOSCOPY  06/15/2016    path only- crypt destructive colitis/multiple colonoscopies    HX CORONARY ARTERY BYPASS GRAFT      2 vessel    HX HEART CATHETERIZATION      HX HEENT Right 2020    partial glossectomy - Invasive squamous cell carcinoma, moderately differentiated, keratinizing type    HX HEENT Right 2021    repeat partial glossectomy    HX HERNIA REPAIR      Abdominal Hernia    HX OTHER SURGICAL  11/11/15    advance cystoscopy    HX TONSILLECTOMY      HX TURP      HX UROLOGICAL      TUNA procedure    HX UROLOGICAL  12    cystoscopy to remove kidney stone x3    LITHOTRIPSY       Past Medical History:   Diagnosis Date    BPH (benign prostatic hyperplasia)     improved after TURP    CAD (coronary artery disease)     MI, PCI/stent to mid CX 02, CABG x2 on 02 (LIMA to LAD & SVG to CX)    Calculus of kidney     due to pelvic kidney    Cancer ([address] 2020    TONGUE    Chronic back pain     scoliosis & OA    Depression     Diabetes ([address]     Hx of motion sickness     Hypertension     Ill-defined condition     ULCERATIVE COLITIS    Obstructive sleep apnea 2018    USES BIPAP    CARROL (obstructive sleep apnea) 6/10/2016    uses CPAP    Other and unspecified hyperlipidemia      Social History     Tobacco Use    Smoking status: Former Smoker     Quit date: 1990     Years since quittin.8    Smokeless tobacco: Never Used   Vaping Use    Vaping Use: Never used   Substance Use Topics    Alcohol use: No     Comment: none    Drug use: No     Allergies   Allergen Reactions    Apriso [Mesalamine] Diarrhea     Per patient - not allergic    Prednisone Other (comments)     Constipation - per patient not allergic    Sulfasalazine Hives     Family History   Problem Relation Age of Onset    Heart Disease Mother     Heart Disease Father     Diabetes Brother     Heart Disease Brother     Arthritis-osteo Brother         back - stenosis    Cancer Brother         KIDNEY    Heart Disease Other         Coronary Artery Disease    Ulcerative Colitis Sister     Heart Disease Sister     Cancer Brother         intestinal    Anesth Problems Neg Hx      No current facility-administered medications for this encounter. PHYSICAL EXAM:  General: WD, WN. Alert, cooperative, no acute distress    HEENT: NC, Atraumatic. PERRLA, EOMI. Anicteric sclerae. Lungs:  CTA Bilaterally. No Wheezing/Rhonchi/Rales. Heart:  Regular  rhythm,  No murmur, No Rubs, No Gallops  Abdomen: Soft, Non distended, Non tender. +Bowel sounds, no HSM  Extremities: No c/c/e  Neurologic:  CN 2-12 gi, Alert and oriented X 3. No acute neurological distress   Psych:   Good insight. Not anxious nor agitated. The heart, lungs and mental status were satisfactory for the administration of MAC sedation and for the procedure. Mallampati score: 2     The patient was counseled at length about the risks of adamaris Covid-19 in the dilip-operative and post-operative states including the recovery window of their procedure.   The patient was made aware that adamaris Covid-19 after a surgical procedure may worsen their prognosis for recovering from the virus and lend to a higher morbidity and or mortality risk. The patient was given the options of postponing their procedure. All of the risks, benefits, and alternatives were discussed. The patient does wish to proceed with the procedure.       Assessment:   · Ulcerative colitis, diarrhea    Plan:   · Endoscopic procedure :Colonoscopy  · MAC sedation

## 2021-10-28 NOTE — ROUTINE PROCESS
Nicolle [facility]  1940  [id number]    Situation:  Verbal report received from: Kirk Ramos RN  Procedure: Procedure(s):  COLONOSCOPY (URGENT)   :-  COLON BIOPSY  ENDOSCOPIC POLYPECTOMY    Background:    Preoperative diagnosis: CLINICALLY SIGNIFICANT DIARRHEA OF UNEXPLAINED ORGIN, DIARRHEA, UNSPECIFIED, FAMILY HX OF MALIGNANT NEOPLASM, UNSPECIFIED, ULCERATIVE COLITIS  Postoperative diagnosis: Colon polyp, hemorrhoids, diverticulosis    :  Dr. Jeni Russell  Assistant(s): Endoscopy RN-1: Christian Rodriguez RN  Endoscopy RN-2: Vesta Delgado RN    Specimens:   ID Type Source Tests Collected by Time Destination   1 : colon bx, right Preservative   Stevenson Araya MD 10/28/2021 1136 Pathology   2 : transverse colon bx Preservative Colon, Transverse  Stevenson Araya MD 10/28/2021 1140 Pathology   3 : colon bx, left  Preservative   Stevenson Araya MD 10/28/2021 1143 Pathology   4 : left colon polyp Preservative   Stevenson Araya MD 10/28/2021 1145 Pathology   5 : rectal bx Preservative   Stevenson Araya MD 10/28/2021 1149 Pathology     H. Pylori  no    Assessment:  Intra-procedure medications   Anesthesia gave intra-procedure sedation and medications, see anesthesia flow sheet yes    Intravenous fluids: NS@ KVO     Vital signs stable     Abdominal assessment: round and soft     Recommendation:  Discharge patient per MD order.     Family or Friend   Permission to share finding with family or friend yes

## 2021-10-28 NOTE — PERIOP NOTES
.Patient has been evaluated by anesthesia pre-procedure. Patient alert and oriented. Vital signs will not be charted by the Endoscopy nurse. All vitals, airway, and loc are monitored by anesthesia staff throughout procedure. Pt's wife informed of procedure delay.     .Endoscope will be  pre-cleaned at bedside immediately following procedure by Leyda Lucas RN
independent

## 2021-10-28 NOTE — DISCHARGE INSTRUCTIONS
[address] [facility].  [address]                                  Aryan Gomez  [id number]  1940    It was my pleasure seeing you for your procedure. You will also receive a summary report with the findings from this procedure and any further recommendations. If you had polyps removed or biopsies taken during your procedure, you will receive a separate letter from me within the next 2 weeks. If you don't receive this letter or if you have any questions, please call my office [phone]. Please take note of the post procedure instructions listed below. Natanael Timmons,    Dr. Jamie Lucas    These instructions give you information on caring for yourself after your procedure. Call your doctor if you have any problems or questions after your procedure. HOME CARE  · Walk if you have belly cramping or gas. Walking will help get rid of the air and reduce the bloated feeling in your belly (abdomen). · Your IV site (where you received drugs) may be tender to touch. Place warm towels on the site; keep your arm up on two pillows if you have any swelling or soreness in the area. · You may shower. ACTIVITY:  · Take frequent rest periods and move at a slower pace for the next 24 hours. .  · You may resume your regular activity tomorrow if you are feeling back to normal.  · Do not drive or ride a bicycle for at least 24 hours (because of the medicine (anesthesia) used during the test). · Do not sign any important legal documents or use or operate any machinery for 24 hours  · Do not take sleeping medicines/nerve drugs for 24 hours unless the doctor tells you. · You can return to work/school tomorrow unless otherwise instructed. NUTRITION:  · Drink plenty of fluids to keep your pee (urine) clear or pale yellow  · Begin with a light meal and progress to your normal diet.  Heavy or fried foods are harder to digest and may make you feel sick to your stomach (nauseated). · Once you are feeling back to normal, you may resume your normal diet as instructed by your doctor. · Avoid alcoholic beverages for 24 hours or as instructed. IF YOU HAD BIOPSIES TAKEN OR POLYPS REMOVED DURING THE PROCEDURE:  · For the next 7 days, avoid all non-steroidal antiinflammatory medications such as Ibuprofen, Motrin, Advil, Alleve, Renetta-seltzer, Goody's powder, BC powder. · If you do not have an heart condition that requires you to take a daily aspirin, you should avoid taking aspirin for 7 days. · Eat a soft diet for 24 hours. · Monitor your stools for any blood or dark black, tar-like, stools as this may be a sign of bleeding and if you see any blood, notify your doctor immediately. GET HELP RIGHT AWAY AND SEEK IMMEDIATE MEDICAL CARE IF:  · You have more than a spotting of blood in your stool. · You pass clumps of tissue (blood clots) or fill the toilet with blood. · Your belly is painfully swollen or puffy (abdominal distention). · You throw up (vomit). · You have a fever. · You have redness, pain or swelling at the IV site that last greater than two days. · You have abdominal pain or discomfort that is severe or gets worse throughout the day. Post-procedure diagnosis:  Colon polyp, hemorrhoids, diverticulosis    Post-procedure recommendations:    Findings:   Rectum: medium internal hemorrhoids, overall normal mucosa - biopsied  Sigmoid: mild erythema with decreased vascularity, biopsied, mild diverticulosis  Descending Colon: one 5 mm sessile polyp, removed with cold snare, mild erythema with decreased vascularity, biopsied  Transverse Colon: mild erythema with decreased vascularity, biopsied  Ascending Colon: mild erythema with decreased vascularity, biopsied  Cecum: small amount of pill debris  Terminal Ileum: normal    Recommendations:   - Await pathology. You should receive a letter within 2 weeks.    - Resume normal medications. - Follow up in GI clinic. Learning About Coronavirus [phone])  Coronavirus [phone]): Overview  What is coronavirus (COVID-19)? The coronavirus disease (COVID-19) is caused by a virus. It is an illness that [facility] [place] [facility] [place]. It has since spread worldwide. The virus can cause fever, cough, and trouble breathing. In severe cases, it can cause pneumonia and make it hard to breathe without help. It can cause death. Coronaviruses are a large group of viruses. They cause the common cold. They also cause more serious illnesses like Middle East respiratory syndrome (MERS) and severe acute respiratory syndrome (SARS). COVID-19 is caused by a novel coronavirus. That means it's a new type that has not been seen in people before. This virus spreads person-to-person through droplets from coughing and sneezing. It can also spread when you are close to someone who is infected. And it can spread when you touch something that has the virus on it, such as a doorknob or a tabletop. What can you do to protect yourself from coronavirus (COVID-19)? The best way to protect yourself from getting sick is to:  · Avoid areas where there is an outbreak. · Avoid contact with people who may be infected. · Wash your hands often with soap or alcohol-based hand sanitizers. · Avoid crowds and try to stay at least 6 feet away from other people. · Wash your hands often, especially after you cough or sneeze. Use soap and water, and scrub for at least 20 seconds. If soap and water aren't available, use an alcohol-based hand . · Avoid touching your mouth, nose, and eyes. What can you do to avoid spreading the virus to others? To help avoid spreading the virus to others:  · Cover your mouth with a tissue when you cough or sneeze. Then throw the tissue in the trash. · Use a disinfectant to clean things that you touch often.   · Stay home if you are sick or have been exposed to the virus. Don't go to school, work, or public areas. And don't use public transportation. · If you are sick:  ? Leave your home only if you need to get medical care. But call the doctor's office first so they know you're coming. And wear a face mask, if you have one.  ? If you have a face mask, wear it whenever you're around other people. It can help stop the spread of the virus when you cough or sneeze. ? Clean and disinfect your home every day. Use household  and disinfectant wipes or sprays. Take special care to clean things that you grab with your hands. These include doorknobs, remote controls, phones, and handles on your refrigerator and microwave. And don't forget countertops, tabletops, bathrooms, and computer keyboards. When to call for help  Call 911 anytime you think you may need emergency care. For example, call if:  · You have severe trouble breathing. (You can't talk at all.)  · You have constant chest pain or pressure. · You are severely dizzy or lightheaded. · You are confused or can't think clearly. · Your face and lips have a blue color. · You pass out (lose consciousness) or are very hard to wake up. Call your doctor now if you develop symptoms such as:  · Shortness of breath. · Fever. · Cough. If you need to get care, call ahead to the doctor's office for instructions before you go. Make sure you wear a face mask, if you have one, to prevent exposing other people to the virus. Where can you get the latest information? The following health organizations are tracking and studying this virus. Their websites contain the most up-to-date information. Nolvia Sales also learn what to do if you think you may have been exposed to the virus. · U.S. Centers for Disease Control and Prevention (CDC): The CDC provides updated news about the disease and travel advice. The website also tells you how to prevent the spread of infection.  www.cdc.gov  · World Health Organization [facility]): WHO offers information about the virus outbreaks. WHO also has travel advice. www.who.int  Current as of: April 1, 2020               Content Version: 12.4  © 2006-2020 Healthwise, Incorporated. Care instructions adapted under license by your healthcare professional. If you have questions about a medical condition or this instruction, always ask your healthcare professional. [address] any warranty or liability for your use of this information.

## 2021-10-28 NOTE — ANESTHESIA POSTPROCEDURE EVALUATION
Post-Anesthesia Evaluation and Assessment    Patient: Rojas Kwon MRN: [id number]  SSN: [id number]    YOB: 1940  Age: 79 y.o. Sex: male      I have evaluated the patient and they are stable and ready for discharge from the PACU. Cardiovascular Function/Vital Signs  Visit Vitals  /70   Pulse 61   Temp 37 °C (98.6 °F)   Resp 19   Ht 6' (1.829 m)   Wt 95.3 kg (210 lb)   SpO2 98%   BMI 28.48 kg/m²       Patient is status post MAC anesthesia for Procedure(s):  COLONOSCOPY (URGENT)   :-  COLON BIOPSY  ENDOSCOPIC POLYPECTOMY. Nausea/Vomiting: None    Postoperative hydration reviewed and adequate. Pain:  Pain Scale 1: Numeric (0 - 10) (10/28/21 1220)  Pain Intensity 1: 0 (10/28/21 1220)   Managed    Neurological Status: At baseline    Mental Status, Level of Consciousness: Alert and  oriented to person, place, and time    Pulmonary Status:   O2 Device: None (Room air) (10/28/21 1220)   Adequate oxygenation and airway patent    Complications related to anesthesia: None    Post-anesthesia assessment completed. No concerns    Signed By: Krystal Hurtado MD     October 28, 2021              Procedure(s):  COLONOSCOPY (URGENT)   :-  COLON BIOPSY  ENDOSCOPIC POLYPECTOMY. MAC    <BSHSIANPOST>    INITIAL Post-op Vital signs:   Vitals Value Taken Time   /70 10/28/21 1220   Temp 37 °C (98.6 °F) 10/28/21 1202   Pulse 61 10/28/21 1226   Resp 15 10/28/21 1226   SpO2 97 % 10/28/21 1226   Vitals shown include unvalidated device data.

## 2022-01-11 DIAGNOSIS — I25.10 CORONARY ARTERY DISEASE INVOLVING NATIVE CORONARY ARTERY OF NATIVE HEART WITHOUT ANGINA PECTORIS: ICD-10-CM

## 2022-01-11 RX ORDER — METOPROLOL TARTRATE 25 MG/1
TABLET, FILM COATED ORAL
Qty: 90 TABLET | Refills: 1 | Status: SHIPPED | OUTPATIENT
Start: 2022-01-11 | End: 2022-03-15 | Stop reason: SDUPTHER

## 2022-02-10 DIAGNOSIS — E11.42 CONTROLLED TYPE 2 DIABETES MELLITUS WITH DIABETIC POLYNEUROPATHY, WITHOUT LONG-TERM CURRENT USE OF INSULIN (HCC): ICD-10-CM

## 2022-02-10 RX ORDER — SIMVASTATIN 40 MG/1
TABLET, FILM COATED ORAL
Qty: 90 TABLET | Refills: 1 | Status: SHIPPED | OUTPATIENT
Start: 2022-02-10 | End: 2022-03-15 | Stop reason: SDUPTHER

## 2022-02-27 DIAGNOSIS — E11.42 CONTROLLED TYPE 2 DIABETES MELLITUS WITH DIABETIC POLYNEUROPATHY, WITHOUT LONG-TERM CURRENT USE OF INSULIN (HCC): ICD-10-CM

## 2022-03-09 DIAGNOSIS — E11.42 CONTROLLED TYPE 2 DIABETES MELLITUS WITH DIABETIC POLYNEUROPATHY, WITHOUT LONG-TERM CURRENT USE OF INSULIN (HCC): Primary | ICD-10-CM

## 2022-03-09 NOTE — PROGRESS NOTES
Can use [facility] downstairs. Needs to be fasting and there is a urine sample. Wife had labs ordered also.

## 2022-03-15 ENCOUNTER — OFFICE VISIT (OUTPATIENT)
Dept: INTERNAL MEDICINE CLINIC | Age: 82
End: 2022-03-15
Payer: MEDICARE

## 2022-03-15 VITALS
DIASTOLIC BLOOD PRESSURE: 73 MMHG | OXYGEN SATURATION: 96 % | HEART RATE: 68 BPM | WEIGHT: 213 LBS | SYSTOLIC BLOOD PRESSURE: 121 MMHG | BODY MASS INDEX: 28.85 KG/M2 | RESPIRATION RATE: 20 BRPM | HEIGHT: 72 IN | TEMPERATURE: 97.5 F

## 2022-03-15 DIAGNOSIS — Z23 ENCOUNTER FOR IMMUNIZATION: ICD-10-CM

## 2022-03-15 DIAGNOSIS — Z00.00 MEDICARE ANNUAL WELLNESS VISIT, SUBSEQUENT: Primary | ICD-10-CM

## 2022-03-15 DIAGNOSIS — E78.00 PURE HYPERCHOLESTEROLEMIA: ICD-10-CM

## 2022-03-15 DIAGNOSIS — I25.10 CORONARY ARTERY DISEASE INVOLVING NATIVE CORONARY ARTERY OF NATIVE HEART WITHOUT ANGINA PECTORIS: ICD-10-CM

## 2022-03-15 DIAGNOSIS — K51.911 ULCERATIVE COLITIS WITH RECTAL BLEEDING, UNSPECIFIED LOCATION (HCC): ICD-10-CM

## 2022-03-15 DIAGNOSIS — F33.42 RECURRENT MAJOR DEPRESSIVE DISORDER, IN FULL REMISSION (HCC): ICD-10-CM

## 2022-03-15 DIAGNOSIS — E11.42 CONTROLLED TYPE 2 DIABETES MELLITUS WITH DIABETIC POLYNEUROPATHY, WITHOUT LONG-TERM CURRENT USE OF INSULIN (HCC): ICD-10-CM

## 2022-03-15 DIAGNOSIS — C02.9 SQUAMOUS CELL CARCINOMA OF TONGUE (HCC): ICD-10-CM

## 2022-03-15 DIAGNOSIS — Z71.89 ADVANCED CARE PLANNING/COUNSELING DISCUSSION: ICD-10-CM

## 2022-03-15 DIAGNOSIS — I10 ESSENTIAL HYPERTENSION, BENIGN: ICD-10-CM

## 2022-03-15 PROCEDURE — G8536 NO DOC ELDER MAL SCRN: HCPCS | Performed by: INTERNAL MEDICINE

## 2022-03-15 PROCEDURE — 99214 OFFICE O/P EST MOD 30 MIN: CPT | Performed by: INTERNAL MEDICINE

## 2022-03-15 PROCEDURE — 1101F PT FALLS ASSESS-DOCD LE1/YR: CPT | Performed by: INTERNAL MEDICINE

## 2022-03-15 PROCEDURE — G8752 SYS BP LESS 140: HCPCS | Performed by: INTERNAL MEDICINE

## 2022-03-15 PROCEDURE — G8754 DIAS BP LESS 90: HCPCS | Performed by: INTERNAL MEDICINE

## 2022-03-15 PROCEDURE — G8419 CALC BMI OUT NRM PARAM NOF/U: HCPCS | Performed by: INTERNAL MEDICINE

## 2022-03-15 PROCEDURE — G9717 DOC PT DX DEP/BP F/U NT REQ: HCPCS | Performed by: INTERNAL MEDICINE

## 2022-03-15 PROCEDURE — G8427 DOCREV CUR MEDS BY ELIG CLIN: HCPCS | Performed by: INTERNAL MEDICINE

## 2022-03-15 PROCEDURE — G0439 PPPS, SUBSEQ VISIT: HCPCS | Performed by: INTERNAL MEDICINE

## 2022-03-15 RX ORDER — SERTRALINE HYDROCHLORIDE 50 MG/1
TABLET, FILM COATED ORAL
Qty: 135 TABLET | Refills: 1 | Status: SHIPPED | OUTPATIENT
Start: 2022-03-15 | End: 2022-08-07

## 2022-03-15 RX ORDER — METOPROLOL TARTRATE 25 MG/1
12.5 TABLET, FILM COATED ORAL 2 TIMES DAILY
Qty: 90 TABLET | Refills: 1 | Status: SHIPPED | OUTPATIENT
Start: 2022-03-15 | End: 2022-08-11

## 2022-03-15 RX ORDER — ASPIRIN 81 MG/1
81 TABLET ORAL DAILY
COMMUNITY

## 2022-03-15 RX ORDER — METFORMIN HYDROCHLORIDE 500 MG/1
TABLET, EXTENDED RELEASE ORAL
Qty: 270 TABLET | Refills: 1 | Status: SHIPPED | OUTPATIENT
Start: 2022-03-15 | End: 2022-08-04

## 2022-03-15 RX ORDER — GABAPENTIN 100 MG/1
100 CAPSULE ORAL 3 TIMES DAILY
Qty: 270 CAPSULE | Refills: 1 | Status: SHIPPED | OUTPATIENT
Start: 2022-03-15 | End: 2022-09-12

## 2022-03-15 RX ORDER — MONTELUKAST SODIUM 4 MG/1
3 TABLET, CHEWABLE ORAL DAILY
COMMUNITY

## 2022-03-15 RX ORDER — SIMVASTATIN 40 MG/1
40 TABLET, FILM COATED ORAL
Qty: 90 TABLET | Refills: 1 | Status: SHIPPED | OUTPATIENT
Start: 2022-03-15 | End: 2022-08-11

## 2022-03-15 NOTE — ASSESSMENT & PLAN NOTE
well controlled, asymptomatic. BP is well controlled, lipids are well controlled. Continue: current plan.

## 2022-03-15 NOTE — PROGRESS NOTES
Erica Alfredo is a [de-identified] y.o. male who was seen in clinic today (3/15/2022) for a full physical.          Assessment & Plan:   Below is the assessment and plan developed based on review of pertinent history, physical exam, labs, studies, and medications. 1. Medicare annual wellness visit, subsequent  2. Advanced care planning/counseling discussion  3. Encounter for immunization  -     diph,Pertuss,AC,,Tet Vac-PF (BOOSTRIX) 2.5-8-5 Lf-mcg suspension; 0.5 mL by IntraMUSCular route once for 1 dose., Print, Disp-0.5 mL, R-0  4. Controlled type 2 diabetes mellitus with diabetic polyneuropathy, without long-term current use of insulin (HCC)  Assessment & Plan:  well controlled, continue current treatment, [facility], no changes   Orders:  -     metFORMIN ER (GLUCOPHAGE XR) 500 mg tablet; TAKE 1 TABLET BY MOUTH EVERY MORNING & TAKE 2 TABLETS BY MOUTH DAILY IN THE EVENING, Normal, Disp-270 Tablet, R-1  -     simvastatin (ZOCOR) 40 mg tablet; Take 1 Tablet by mouth nightly.  , Normal, Disp-90 Tablet, R-1  -     empagliflozin (Jardiance) 10 mg tablet; TAKE 1 TABLET BY MOUTH EVERY DAY, Normal, Disp-90 Tablet, R-1  -     gabapentin (NEURONTIN) 100 mg capsule; Take 1 Capsule by mouth three (3) times daily. Max Daily Amount: 300 mg. TAKE 1 CAPSULE BY MOUTH THREE TIMES DAILY, Normal, Disp-270 Capsule, R-1  5. Recurrent major depressive disorder, in full remission ([facility] [address]  Assessment & Plan:  well controlled, continue current treatment    Orders:  -     sertraline (ZOLOFT) 50 mg tablet; TAKE 1 AND 1/2 TABLETS BY MOUTH EVERY DAY, Normal, Disp-135 Tablet, R-1This prescription was filled on 08/21/2020. Any refills authorized will be placed on file. 6. Squamous cell carcinoma of tongue (HCC)  Assessment & Plan:  Asymptomatic, monitored by specialist. No acute findings meriting change in the plan  7.  Ulcerative colitis with rectal bleeding, unspecified location [facility])  Assessment & Plan:  Improved, monitored by specialist. No acute findings meriting change in the plan  8. Coronary artery disease involving native coronary artery of native heart without angina pectoris  Assessment & Plan:  well controlled, asymptomatic. BP is well controlled, lipids are well controlled. Continue: current plan. Orders:  -     simvastatin (ZOCOR) 40 mg tablet; Take 1 Tablet by mouth nightly.  , Normal, Disp-90 Tablet, R-1  -     metoprolol tartrate (LOPRESSOR) 25 mg tablet; Take 0.5 Tablets by mouth two (2) times a day., Normal, Disp-90 Tablet, R-1  9. Essential hypertension, benign  Assessment & Plan:  at goal, continue current treatment    Orders:  -     metoprolol tartrate (LOPRESSOR) 25 mg tablet; Take 0.5 Tablets by mouth two (2) times a day., Normal, Disp-90 Tablet, R-1  10. Pure hypercholesterolemia  Assessment & Plan:  at goal, continue current treatment   Orders:  -     simvastatin (ZOCOR) 40 mg tablet; Take 1 Tablet by mouth nightly.  , Normal, Disp-90 Tablet, R-1      Follow-up and Dispositions    · Return in about 6 months (around 9/15/2022) for Regular follow up. Subjective:   Felecia Conway is here today for a full physical.      Annual Wellness Visit- Subsequent Visit    Since last visit: no changes    The following acute and/or chronic problems were addressed today:    Endocrine Review  He is seen for diabetes with neuropathy. Testing: is performed sporadically, fasting minimum reading is 97, maximum reading is 127. He reports medication compliance: compliant all of the time. Medication side effects: none. Diabetic diet compliance: compliant all of the time. [facility]  reviewed, Gabapentin last filled on 12/20/21 - #270. He thinks it is helping somewhat. Still having some numbness/tingling in feet.     Cardiovascular Review  The patient has CAD, hypertension and hyperlipidemia. He reports taking medications as instructed, no medication side effects noted, patient does not perform home BP monitoring.   He generally follows a low fat low cholesterol diet, generally follows a low sodium diet.       Mental Health Review  Patient is seen for depression. PHQ9 reviewed. He has no concerns today. Reports experiences the following side effects from the treatment: none.        End of Life Planning: This was discussed with him today and he has an advanced directive - a copy has been provided. Reviewed DNR/DNI and patient is not interested. Depression Screen:  3 most recent PHQ Screens 3/15/2022   PHQ Not Done -   Little interest or pleasure in doing things Not at all   Feeling down, depressed, irritable, or hopeless Not at all   Total Score PHQ 2 0   Trouble falling or staying asleep, or sleeping too much Not at all   Feeling tired or having little energy Not at all   Poor appetite, weight loss, or overeating Not at all   Feeling bad about yourself - or that you are a failure or have let yourself or your family down Not at all   Trouble concentrating on things such as school, work, reading, or watching TV Not at all   Moving or speaking so slowly that other people could have noticed; or the opposite being so fidgety that others notice Not at all   Thoughts of being better off dead, or hurting yourself in some way Not at all   PHQ 9 Score 0   How difficult have these problems made it for you to do your work, take care of your home and get along with others Not difficult at all         Fall Risk:   Fall Risk Assessment, last 12 mths 3/15/2022   Able to walk? Yes   Fall in past 12 months? 0   Do you feel unsteady? 1   Are you worried about falling 1   Is TUG test greater than 12 seconds? -   Is the gait abnormal? 0   Number of falls in past 12 months -   Fall with injury? -       Abuse Screen:  Abuse Screening Questionnaire 3/15/2022   Do you ever feel afraid of your partner? N   Are you in a relationship with someone who physically or mentally threatens you? N   Is it safe for you to go home?  Y       Do you average more than 1 drink per night or more than 7 drinks a week: No    In the past three months have you have had more than 4 drinks containing alcohol on one occasion: No          Health Maintenance:   Daily Aspirin: no  AAA Screening: DT scan on 10/21/15    Immunizations:   Covid: up to date   Influenza: up to date   Tetanus: not UTD- script provided   Shingles: up to date   Pneumonia: up to date  Cancer screening:   Prostate: guidelines reviewed, n/a  Colon: guidelines reviewed, up to date    Functional Ability and Level of Safety:    Hearing: Hearing is good. The patient wears hearing aids. Cognition Screen:   Has your family/caregiver stated any concerns about your memory: no - but does notice it is not as good. Cognitive Screening: Normal - Mini Cog Test     Ambulation: with mild difficulty     Activities of Daily Living: The home contains: grab bars  Patient does total self care  Exercise: moderately active    Adult Nutrition Screen:  No risk factors noted. Patient Care Team:  Ferny De Jesus MD as PCP - General (Internal Medicine)  Ferny De Jesus MD as PCP - [facility] Empaneled Provider  Livia Monreal MD (Ophthalmology)  Tammy Vieira MD as Physician (Urology)  Connie Roy DPM (Susi Celestino)  Kim Bryant MD (Pulmonary Disease)  Can Sanderson MD (Gastroenterology)  Bj Mathews MD (Gastroenterology)  Raciel Padron MD (Otolaryngology)       The following sections were reviewed & updated as appropriate: Problem List, Allergies, PMH, PSH, FH, and SH. Prior to Admission medications    Medication Sig Start Date End Date Taking? Authorizing Provider   aspirin delayed-release 81 mg tablet Take 81 mg by mouth daily. Yes Provider, Historical   colestipoL (COLESTID) 1 gram tablet Take 3 g by mouth daily.    Yes Provider, Historical   empagliflozin (Jardiance) 10 mg tablet TAKE 1 TABLET BY MOUTH EVERY DAY 2/27/22  Yes Ferny De Jesus MD   simvastatin (ZOCOR) 40 mg tablet TAKE 1 TABLET BY MOUTH AT BEDTIME 2/10/22 Yes Quin Calix MD   metoprolol tartrate (LOPRESSOR) 25 mg tablet TAKE 1/2 TABLET BY MOUTH TWICE DAILY 1/11/22  Yes Quin Calix MD   mesalamine (LIALDA) 1.2 gram delayed release tablet Take 2.4 g by mouth two (2) times a day. Yes Provider, Historical   metFORMIN ER (GLUCOPHAGE XR) 500 mg tablet TAKE 1 TABLET BY MOUTH EVERY MORNING & TAKE 2 TABLETS BY MOUTH DAILY IN THE EVENING 9/15/21  Yes Quin Calix MD   gabapentin (NEURONTIN) 100 mg capsule Take 1 Capsule by mouth three (3) times daily. Max Daily Amount: 300 mg. TAKE 1 CAPSULE BY MOUTH THREE TIMES DAILY 9/15/21  Yes Quin Calix MD   sertraline (ZOLOFT) 50 mg tablet TAKE 1 AND 1/2 TABLETS BY MOUTH EVERY DAY 6/21/21  Yes Quin Calix MD   cholecalciferol, vitamin D3, (VITAMIN D3 PO) Take 2,000 Units by mouth daily. Yes Provider, Historical   vitB6/mag cit,ox/potassium cit (THERALITH XR PO) Take 2 Caps by mouth two (2) times a day. Yes Provider, Historical   MULTIVITAMIN PO Take 1 Tab by mouth daily. Yes Provider, Historical   glucose blood VI test strips (True Metrix Glucose Test Strip) strip Test blood sugars daily. DX: E11.40, E11.65  Patient not taking: Reported on 3/15/2022 3/13/20   Quin Calix MD   lancets misc Use daily as directed DX: E11.49  Patient not taking: Reported on 3/15/2022 3/14/19   Quin Calix MD   Blood-Glucose Meter (TRUE METRIX GLUCOSE METER) misc Use to test blood sugars daily. DX:  E11.49  Patient not taking: Reported on 3/15/2022 3/13/19   Quin Calix MD          Review of Systems   Constitutional: Negative for chills and fever. Respiratory: Negative for cough and shortness of breath. Cardiovascular: Negative for chest pain and palpitations. Gastrointestinal: Negative for abdominal pain, blood in stool, constipation, diarrhea, heartburn, nausea and vomiting.    Genitourinary:        He denies: nocturia, urinary hesitancy, urinary frequency, weak stream.   Musculoskeletal: Positive for joint pain (knees & hands, on/off). Negative for myalgias. Neurological: Positive for tingling and sensory change. Negative for focal weakness and headaches. Endo/Heme/Allergies: Does not bruise/bleed easily. Psychiatric/Behavioral: Negative for depression. The patient is not nervous/anxious and does not have insomnia. Objective:   Physical Exam  Constitutional:       General: He is not in acute distress. Appearance: Normal appearance. Eyes:      Conjunctiva/sclera: Conjunctivae normal.   Cardiovascular:      Rate and Rhythm: Regular rhythm. Pulses:           Dorsalis pedis pulses are 1+ on the right side and 1+ on the left side. Heart sounds: No murmur heard. Pulmonary:      Effort: Pulmonary effort is normal.      Breath sounds: Normal breath sounds. No decreased breath sounds or wheezing. Abdominal:      General: Bowel sounds are normal.      Palpations: Abdomen is soft. Tenderness: There is no abdominal tenderness. Musculoskeletal:      Right lower leg: No edema. Left lower leg: No edema.    Feet:      Right foot:      Skin integrity: Skin integrity normal.      Toenail Condition: Right toenails are normal.      Left foot:      Skin integrity: Skin integrity normal.      Toenail Condition: Left toenails are normal.      Comments:        Left foot Filament test: normal sensation        Right foot Filament test: normal sensation   Psychiatric:         Mood and Affect: Mood and affect normal.         Behavior: Behavior normal.          Visit Vitals  /73 (BP 1 Location: Left upper arm, BP Patient Position: Sitting, BP Cuff Size: Large adult)   Pulse 68   Temp 97.5 °F (36.4 °C) (Temporal)   Resp 20   Ht 6' (1.829 m)   Wt 213 lb (96.6 kg)   SpO2 96%   BMI 28.89 kg/m²         Shaun Perez MD

## 2022-03-15 NOTE — ACP (ADVANCE CARE PLANNING)
Advance Care Planning   Advance Care Planning (ACP) Physician/NP/PA (Provider) Conversation    Date of ACP Conversation: 03/15/22  Persons included in Conversation:  patient  Length of ACP Conversation in minutes: <16 minutes (Non-Billable)    Authorized Decision Maker (if patient is incapable of making informed decisions):   Named in Advance Directive or Healthcare Power of       Primary Decision Maker: Anisa Enrique - [phone]    He has an advanced directive - a copy has been provided & still reflects him wishes. Reviewed DNR/DNI and patient is not interested- elects Full Code (attempt resuscitation).        Yary Majano MD

## 2022-03-15 NOTE — PATIENT INSTRUCTIONS
Preventing Falls: Care Instructions  Your Care Instructions     Getting around your home safely can be a challenge if you have injuries or health problems that make it easy for you to fall. Loose rugs and furniture in walkways are among the dangers for many older people who have problems walking or who have poor eyesight. People who have conditions such as arthritis, osteoporosis, or dementia also have to be careful not to fall. You can make your home safer with a few simple measures. Follow-up care is a key part of your treatment and safety. Be sure to make and go to all appointments, and call your doctor if you are having problems. It's also a good idea to know your test results and keep a list of the medicines you take. How can you care for yourself at home? Taking care of yourself  · Exercise regularly to improve your strength, muscle tone, and balance. Walk if you can. Swimming may be a good choice if you cannot walk easily. · Have your vision and hearing checked each year or any time you notice a change. If you have trouble seeing and hearing, you might not be able to avoid objects and could lose your balance. · Know the side effects of the medicines you take. Ask your doctor or pharmacist whether the medicines you take can affect your balance. Sleeping pills or sedatives can affect your balance. · Limit the amount of alcohol you drink. Alcohol can impair your balance and other senses. · Ask your doctor whether calluses or corns on your feet need to be removed. If you wear loose-fitting shoes because of calluses or corns, you can lose your balance and fall. · Talk to your doctor if you have numbness in your feet. · You may get dizzy if you do not drink enough water. To prevent dehydration, drink plenty of fluids. Choose water and other clear liquids.  If you have kidney, heart, or liver disease and have to limit fluids, talk with your doctor before you increase the amount of fluids you drink.  Preventing falls at home  · Remove raised doorway thresholds, throw rugs, and clutter. Repair loose carpet or raised areas in the floor. · Move furniture and electrical cords to keep them out of walking paths. · Use nonskid floor wax, and wipe up spills right away, especially on ceramic tile floors. · If you use a walker or cane, put rubber tips on it. If you use crutches, clean the bottoms of them regularly with an abrasive pad, such as steel wool. · Keep your house well lit, especially [facility], and outside walkways. Use night-lights in areas such as hallways and bathrooms. Add extra light switches or use remote switches (such as switches that go on or off when you clap your hands) to make it easier to turn lights on if you have to get up during the night. · Install sturdy handrails on stairways. · Move items in your cabinets so that the things you use a lot are on the lower shelves (about waist level). · Keep a cordless phone and a flashlight with new batteries by your bed. If possible, put a phone in each of the main rooms of your house, or carry a cell phone in case you fall and cannot reach a phone. Or, you can wear a device around your neck or wrist. You push a button that sends a signal for help. · Wear low-heeled shoes that fit well and give your feet good support. Use footwear with nonskid soles. Check the heels and soles of your shoes for wear. Repair or replace worn heels or soles. · Do not wear socks without shoes on wood floors. · Walk on the grass when the sidewalks are slippery. If you live in an area that gets snow and ice in the winter, sprinkle salt on slippery steps and sidewalks. Or ask a family member or friend to do this for you. Preventing falls in the bath  · Install grab bars and nonskid mats inside and outside your shower or tub and near the toilet and sinks. · Use shower chairs and bath benches.   · Use a hand-held shower head that will allow you to sit while showering. · Get into a tub or shower by putting the weaker leg in first. Get out of a tub or shower with your strong side first.  · Repair loose toilet seats and consider installing a raised toilet seat to make getting on and off the toilet easier. · Keep your bathroom door unlocked while you are in the shower. Where can you learn more? Go to http://www.marquez.com/  Enter G117 in the search box to learn more about \"Preventing Falls: Care Instructions. \"  Current as of: September 8, 2021               Content Version: 13.2  © 8843-6004 [employer]. Care instructions adapted under license by [employer] (which disclaims liability or warranty for this information). If you have questions about a medical condition or this instruction, always ask your healthcare professional. [address] any warranty or liability for your use of this information. Medicare Wellness Visit, Male    The best way to live healthy is to have a lifestyle where you eat a well-balanced diet, exercise regularly, limit alcohol use, and quit all forms of tobacco/nicotine, if applicable. Regular preventive services are another way to keep healthy. Preventive services (vaccines, screening tests, monitoring & exams) can help personalize your care plan, which helps you manage your own care. Screening tests can find health problems at the earliest stages, when they are easiest to treat. [facility] follows the current, evidence-based guidelines published by the [place] (USPSTF) when recommending preventive services for our patients. Because we follow these guidelines, sometimes recommendations change over time as research supports it. (For example, a prostate screening blood test is no longer routinely recommended for men with no symptoms).   Of course, you and your doctor may decide to screen more often for some diseases, based on your risk and co-morbidities (chronic disease you are already diagnosed with). Preventive services for you include:  - Medicare offers their members a free annual wellness visit, which is time for you and your primary care provider to discuss and plan for your preventive service needs. Take advantage of this benefit every year!  -All adults over age 72 should receive the recommended pneumonia vaccines. Current USPSTF guidelines recommend a series of two vaccines for the best pneumonia protection.   -All adults should have a flu vaccine yearly and tetanus vaccine every 10 years.  -All adults age 48 and older should receive the shingles vaccines (series of two vaccines). -All adults age 38-68 who are overweight should have a diabetes screening test once every three years.   -Other screening tests & preventive services for persons with diabetes include: an eye exam to screen for diabetic retinopathy, a kidney function test, a foot exam, and stricter control over your cholesterol.   -Cardiovascular screening for adults with routine risk involves an electrocardiogram (ECG) at intervals determined by the provider.   -Colorectal cancer screening should be done for adults age 54-65 with no increased risk factors for colorectal cancer. There are a number of acceptable methods of screening for this type of cancer. Each test has its own benefits and drawbacks. Discuss with your provider what is most appropriate for you during your annual wellness visit. The different tests include: colonoscopy (considered the best screening method), a fecal occult blood test, a fecal DNA test, and sigmoidoscopy.  -All adults born between [facility] should be screened once for Hepatitis C.  -An Abdominal Aortic Aneurysm (AAA) Screening is recommended for men age 73-68 who has ever smoked in their lifetime.      Here is a list of your current Health Maintenance items (your personalized list of preventive services) with a due date: There are no preventive care reminders to display for this patient.

## 2022-03-15 NOTE — PROGRESS NOTES
Chief Complaint   Patient presents with   [facility] Annual Wellness Visit       1. \"Have you been to the ER, urgent care clinic since your last visit? Hospitalized since your last visit? \" No    2. \"Have you seen or consulted any other health care providers outside of the [address] [place] [address] since your last visit? \" No     3. For patients aged 39-70: Has the patient had a colonoscopy / FIT/ Cologuard?  NA - based on age

## 2022-03-19 PROBLEM — C02.9 SQUAMOUS CELL CARCINOMA OF TONGUE (HCC): Status: ACTIVE | Noted: 2020-11-11

## 2022-07-01 ENCOUNTER — HOSPITAL ENCOUNTER (EMERGENCY)
Age: 82
Discharge: HOME | End: 2022-07-01
Attending: EMERGENCY MEDICINE
Payer: MEDICARE

## 2022-07-01 VITALS
WEIGHT: 210.1 LBS | BODY MASS INDEX: 28.46 KG/M2 | RESPIRATION RATE: 14 BRPM | OXYGEN SATURATION: 99 % | TEMPERATURE: 98 F | SYSTOLIC BLOOD PRESSURE: 144 MMHG | DIASTOLIC BLOOD PRESSURE: 65 MMHG | HEIGHT: 72 IN | HEART RATE: 80 BPM

## 2022-07-01 DIAGNOSIS — M77.01 MEDIAL EPICONDYLITIS OF RIGHT ELBOW: Primary | ICD-10-CM

## 2022-07-01 PROCEDURE — 99283 EMERGENCY DEPT VISIT LOW MDM: CPT

## 2022-07-01 RX ORDER — NAPROXEN 250 MG/1
250 TABLET ORAL 2 TIMES DAILY WITH MEALS
Qty: 14 TABLET | Refills: 0 | Status: SHIPPED | OUTPATIENT
Start: 2022-07-01 | End: 2022-07-08

## 2022-07-01 RX ORDER — OXYCODONE HYDROCHLORIDE 5 MG/1
5 TABLET ORAL
Qty: 5 TABLET | Refills: 0 | Status: SHIPPED | OUTPATIENT
Start: 2022-07-01 | End: 2022-07-04

## 2022-07-01 NOTE — ED PROVIDER NOTES
EMERGENCY DEPARTMENT HISTORY AND PHYSICAL EXAM      Date: 7/1/2022  Patient Name: Kathrin Patino    History of Presenting Illness     Chief Complaint   Patient presents with    Arm Pain     noticed for two weeks pain from right elbow radiating down right forearm; notices a pain that shoots through the right 4th finger tingling; today noticed swelling right hand. no known injury. History Provided By: Patient, wife Alyse Leigh)    HPI: Kathrin Patino, [de-identified] y.o. RHD male presents BIB self to the ED with cc of atraumatic right upper extremity pain intermittent x2 weeks. The pain begins at the elbow and shoots down to the ulnar aspect of the right hand. The pain is described as sharp and shooting with associated tingling in the fingertips. The pain is keeping him up at night. Today he felt like the dorsum of his right hand was slightly swollen. He denies known trauma or injury. He does not play tennis. He does admit to doing yard work quite frequently. There are no other complaints, changes, or physical findings at this time. PCP: Miya Hobson MD    No current facility-administered medications on file prior to encounter. Current Outpatient Medications on File Prior to Encounter   Medication Sig Dispense Refill    aspirin delayed-release 81 mg tablet Take 81 mg by mouth daily.  colestipoL (COLESTID) 1 gram tablet Take 3 g by mouth daily.  metFORMIN ER (GLUCOPHAGE XR) 500 mg tablet TAKE 1 TABLET BY MOUTH EVERY MORNING & TAKE 2 TABLETS BY MOUTH DAILY IN THE EVENING 270 Tablet 1    simvastatin (ZOCOR) 40 mg tablet Take 1 Tablet by mouth nightly. 90 Tablet 1    empagliflozin (Jardiance) 10 mg tablet TAKE 1 TABLET BY MOUTH EVERY DAY 90 Tablet 1    metoprolol tartrate (LOPRESSOR) 25 mg tablet Take 0.5 Tablets by mouth two (2) times a day.  90 Tablet 1    sertraline (ZOLOFT) 50 mg tablet TAKE 1 AND 1/2 TABLETS BY MOUTH EVERY  Tablet 1    gabapentin (NEURONTIN) 100 mg capsule Take 1 Capsule by mouth three (3) times daily. Max Daily Amount: 300 mg. TAKE 1 CAPSULE BY MOUTH THREE TIMES DAILY 270 Capsule 1    mesalamine (LIALDA) 1.2 gram delayed release tablet Take 2.4 g by mouth two (2) times a day.  cholecalciferol, vitamin D3, (VITAMIN D3 PO) Take 2,000 Units by mouth daily.  vitB6/mag cit,ox/potassium cit (THERALITH XR PO) Take 2 Caps by mouth two (2) times a day.  glucose blood VI test strips (True Metrix Glucose Test Strip) strip Test blood sugars daily. DX: E11.40, E11.65 (Patient not taking: Reported on 3/15/2022) 100 Strip 1    lancets misc Use daily as directed DX: E11.49 (Patient not taking: Reported on 3/15/2022) 100 Each 1    Blood-Glucose Meter (TRUE METRIX GLUCOSE METER) misc Use to test blood sugars daily. DX:  E11.49 (Patient not taking: Reported on 3/15/2022) 1 Each 0    MULTIVITAMIN PO Take 1 Tab by mouth daily. Past History     Past Medical History:  Past Medical History:   Diagnosis Date    BPH (benign prostatic hyperplasia)     improved after TURP    CAD (coronary artery disease) 2005    MI, PCI/stent to mid CX 6/1/02, CABG x2 on 9/6/02 (LIMA to LAD & SVG to CX)    Calculus of kidney     due to pelvic kidney    Cancer ([facility] [address] 11/2020    TONGUE    Chronic back pain     scoliosis & OA    Depression     Diabetes ([facility] [address]     Hx of motion sickness     Hypertension     Ill-defined condition     ULCERATIVE COLITIS    Obstructive sleep apnea 2018    USES BIPAP    CARROL (obstructive sleep apnea) 6/10/2016    uses CPAP    Other and unspecified hyperlipidemia        Past Surgical History:  Past Surgical History:   Procedure Laterality Date    COLONOSCOPY N/A 9/27/2017    active colitis on biopsy.   Performed by True Rosario MD at [address] Left 9/19/2018    active colitis - performed by True Rosario MD at [address] COLONOSCOPY N/A 5/15/2020    colitis, no polyps - performed by Randy Miller [facility] at [facility] ENDOSCOPY    COLONOSCOPY N/A 10/28/2021    COLONOSCOPY (URGENT)   :- performed by Felix Lechuga MD at [facility] ENDOSCOPY    ENDOSCOPY, COLON, DIAGNOSTIC  6/3/14    Dr. Adan Izaguirre recommended q2 surveil    HX CATARACT REMOVAL  ,     both eyes, laser repair to left eye in     HX COLONOSCOPY  06/15/2016    path only- crypt destructive colitis/multiple colonoscopies    HX CORONARY ARTERY BYPASS GRAFT      2 vessel    HX HEART CATHETERIZATION      HX HEENT Right 2020    partial glossectomy - Invasive squamous cell carcinoma, moderately differentiated, keratinizing type    HX HEENT Right 2021    repeat partial glossectomy    HX HERNIA REPAIR      Abdominal Hernia    HX OTHER SURGICAL  11/11/15    advance cystoscopy    HX TONSILLECTOMY      HX TURP      HX UROLOGICAL      TUNA procedure    HX UROLOGICAL  12    cystoscopy to remove kidney stone x3    LITHOTRIPSY         Family History:  Family History   Problem Relation Age of Onset    Heart Disease Mother     Heart Disease Father     Diabetes Brother     Heart Disease Brother     OSTEOARTHRITIS Brother         back - stenosis    Kidney cancer Brother     Heart Disease Other         Coronary Artery Disease    Ulcerative Colitis Sister     Heart Disease Sister     Cancer Brother         intestinal    Anesth Problems Neg Hx        Social History:  Social History     Tobacco Use    Smoking status: Former Smoker     Quit date: 1990     Years since quittin.5    Smokeless tobacco: Never Used   Vaping Use    Vaping Use: Never used   Substance Use Topics    Alcohol use: No     Comment: none    Drug use: No       Allergies: Allergies   Allergen Reactions    Apriso [Mesalamine] Diarrhea     Per patient - not allergic    Prednisone Other (comments)     Constipation - per patient not allergic    Sulfasalazine Hives         Review of Systems   Review of Systems   Constitutional: Negative for fever. Musculoskeletal: Positive for arthralgias. Skin: Negative for rash. Neurological:        Radicular pain from elbow       Physical Exam   Physical Exam  Vitals and nursing note reviewed. Constitutional:       General: He is not in acute distress. Appearance: Normal appearance. HENT:      Head: Normocephalic and atraumatic. Eyes:      Extraocular Movements: Extraocular movements intact. Conjunctiva/sclera: Conjunctivae normal.   Neck:      Trachea: Phonation normal.   Cardiovascular:      Pulses:           Radial pulses are 2+ on the right side. Pulmonary:      Effort: Pulmonary effort is normal.   Musculoskeletal:         General: Normal range of motion. Right elbow: No swelling or deformity. Normal range of motion. No tenderness. Right forearm: Normal.      Right wrist: Normal.      Right hand: Normal.      Cervical back: Normal range of motion and neck supple. Skin:     General: Skin is warm and dry. Neurological:      Mental Status: He is alert and oriented to person, place, and time. GCS: GCS eye subscore is 4. GCS verbal subscore is 5. GCS motor subscore is 6. Psychiatric:         Mood and Affect: Mood normal.         Behavior: Behavior normal.         Diagnostic Study Results     Labs -   No results found for this or any previous visit (from the past 12 hour(s)). Radiologic Studies -   No orders to display     CT Results  (Last 48 hours)    None        CXR Results  (Last 48 hours)    None            Medical Decision Making   I am the first provider for this patient. I reviewed the vital signs, available nursing notes, past medical history, past surgical history, family history and social history. Vital Signs-Reviewed the patient's vital signs. Patient Vitals for the past 12 hrs:   Temp Pulse Resp BP SpO2   07/01/22 1455 98 °F (36.7 °C) 80 14 (!) 144/65 99 %       Records Reviewed: Nursing Notes and Previous Laboratory Studies. Normal Creatinine.      Provider Notes (Medical Decision Making):       ED Course:   Initial assessment performed. The patients presenting problems have been discussed, and they are in agreement with the care plan formulated and outlined with them. I have encouraged them to ask questions as they arise throughout their visit. ED Course as of 07/01/22 1425   Fri Jul 01, 2022   1623 Advised on medial epicondylitis. Sling provided for comfort. Advised on periodic range of motion exercises to prevent frozen shoulder. Will initiate a short course of NSAIDs, nighttime Percocet as needed for severe pain. Follow-up with PCP, Ortho if no improvement. ED return precautions reviewed. [AS]      ED Course User Index  [AS] KASEY Meyer       Critical Care Time: None    Disposition:  Dc    PLAN:  1. Discharge Medication List as of 7/1/2022  4:21 PM      START taking these medications    Details   naproxen (NAPROSYN) 250 mg tablet Take 1 Tablet by mouth two (2) times daily (with meals) for 7 days. , Normal, Disp-14 Tablet, R-0      oxyCODONE IR (Roxicodone) 5 mg immediate release tablet Take 1 Tablet by mouth every six (6) hours as needed for Pain for up to 3 days. Max Daily Amount: 20 mg., Normal, Disp-5 Tablet, R-0         CONTINUE these medications which have NOT CHANGED    Details   aspirin delayed-release 81 mg tablet Take 81 mg by mouth daily. , Historical Med      colestipoL (COLESTID) 1 gram tablet Take 3 g by mouth daily. , Historical Med      metFORMIN ER (GLUCOPHAGE XR) 500 mg tablet TAKE 1 TABLET BY MOUTH EVERY MORNING & TAKE 2 TABLETS BY MOUTH DAILY IN THE EVENING, Normal, Disp-270 Tablet, R-1      simvastatin (ZOCOR) 40 mg tablet Take 1 Tablet by mouth nightly.  , Normal, Disp-90 Tablet, R-1      empagliflozin (Jardiance) 10 mg tablet TAKE 1 TABLET BY MOUTH EVERY DAY, Normal, Disp-90 Tablet, R-1      metoprolol tartrate (LOPRESSOR) 25 mg tablet Take 0.5 Tablets by mouth two (2) times a day., Normal, Disp-90 Tablet, R-1 sertraline (ZOLOFT) 50 mg tablet TAKE 1 AND 1/2 TABLETS BY MOUTH EVERY DAY, Normal, Disp-135 Tablet, R-1This prescription was filled on 08/21/2020. Any refills authorized will be placed on file.      gabapentin (NEURONTIN) 100 mg capsule Take 1 Capsule by mouth three (3) times daily. Max Daily Amount: 300 mg. TAKE 1 CAPSULE BY MOUTH THREE TIMES DAILY, Normal, Disp-270 Capsule, R-1      mesalamine (LIALDA) 1.2 gram delayed release tablet Take 2.4 g by mouth two (2) times a day., Historical Med      cholecalciferol, vitamin D3, (VITAMIN D3 PO) Take 2,000 Units by mouth daily. , Historical Med      vitB6/mag cit,ox/potassium cit (THERALITH XR PO) Take 2 Caps by mouth two (2) times a day., Historical Med      glucose blood VI test strips (True Metrix Glucose Test Strip) strip Test blood sugars daily. DX: E11.40, E11.65, Normal, Disp-100 Strip, R-1      lancets misc Use daily as directed DX: E11.49, Normal, Disp-100 Each, R-1      Blood-Glucose Meter (TRUE METRIX GLUCOSE METER) misc Use to test blood sugars daily. DX:  E11.49, Normal, Disp-1 Each, R-0      MULTIVITAMIN PO Take 1 Tab by mouth daily. , Historical Med           2. Follow-up Information     Follow up With Specialties Details Why Contact Info    [facility] EMERGENCY DEPT Emergency Medicine  As needed, If symptoms worsen [address] [facility] [address]    Radha Fuller MD Internal Medicine Physician Call  For follow up [facility] [address] [id number] [facility]  [phone]      [facility]  Call  For follow up [address] [facility]  [address] [facility],[address]  [address] [facility]  [phone]        Return to ED if worse     Diagnosis     Clinical Impression:   1. Medial epicondylitis of right elbow          Please note that this dictation was completed with [employer]ancell, the [employer] voice recognition software.  Quite often unanticipated grammatical, syntax, homophones, and other interpretive errors are inadvertently transcribed by the computer software. Please disregards these errors. Please excuse any errors that have escaped final proofreading.

## 2022-08-01 ENCOUNTER — TELEPHONE (OUTPATIENT)
Dept: INTERNAL MEDICINE CLINIC | Age: 82
End: 2022-08-01

## 2022-08-01 ENCOUNTER — PATIENT MESSAGE (OUTPATIENT)
Dept: INTERNAL MEDICINE CLINIC | Age: 82
End: 2022-08-01

## 2022-08-01 NOTE — TELEPHONE ENCOUNTER
Reason for call:  patient needs a recommendation for a rheumatologist    Is this a new problem: yes     Date of last appointment:  3/15/2022     Can we respond via [employer]: no    Best call back number:   Shanti Roman [address]) [phone] [facility]

## 2022-08-04 ENCOUNTER — OFFICE VISIT (OUTPATIENT)
Dept: INTERNAL MEDICINE CLINIC | Age: 82
End: 2022-08-04
Payer: MEDICARE

## 2022-08-04 VITALS
SYSTOLIC BLOOD PRESSURE: 140 MMHG | HEART RATE: 90 BPM | OXYGEN SATURATION: 98 % | HEIGHT: 72 IN | DIASTOLIC BLOOD PRESSURE: 60 MMHG | TEMPERATURE: 97.8 F | RESPIRATION RATE: 16 BRPM | WEIGHT: 200 LBS | BODY MASS INDEX: 27.09 KG/M2

## 2022-08-04 DIAGNOSIS — M35.3 POLYMYALGIA RHEUMATICA (HCC): Primary | ICD-10-CM

## 2022-08-04 DIAGNOSIS — R63.4 WEIGHT LOSS: ICD-10-CM

## 2022-08-04 DIAGNOSIS — E08.65 DIABETES MELLITUS DUE TO UNDERLYING CONDITION WITH HYPERGLYCEMIA, WITHOUT LONG-TERM CURRENT USE OF INSULIN (HCC): ICD-10-CM

## 2022-08-04 DIAGNOSIS — M35.3 POLYMYALGIA RHEUMATICA (HCC): ICD-10-CM

## 2022-08-04 PROCEDURE — 99214 OFFICE O/P EST MOD 30 MIN: CPT | Performed by: STUDENT IN AN ORGANIZED HEALTH CARE EDUCATION/TRAINING PROGRAM

## 2022-08-04 RX ORDER — CALCIUM CITRATE/VITAMIN D3 200MG-6.25
TABLET ORAL
Qty: 100 STRIP | Refills: 1 | Status: SHIPPED | OUTPATIENT
Start: 2022-08-04 | End: 2022-09-16 | Stop reason: ALTCHOICE

## 2022-08-04 RX ORDER — METFORMIN HYDROCHLORIDE 500 MG/1
500 TABLET, EXTENDED RELEASE ORAL 2 TIMES DAILY
Qty: 360 TABLET | Refills: 1 | Status: SHIPPED | OUTPATIENT
Start: 2022-08-04 | End: 2022-08-12 | Stop reason: SDUPTHER

## 2022-08-04 RX ORDER — PREDNISONE 10 MG/1
TABLET ORAL
Qty: 32 TABLET | Refills: 0 | Status: SHIPPED | OUTPATIENT
Start: 2022-08-04 | End: 2022-08-20

## 2022-08-04 RX ORDER — PREDNISONE 5 MG/1
15 TABLET ORAL DAILY
Qty: 270 TABLET | Refills: 0 | Status: CANCELLED | OUTPATIENT
Start: 2022-08-04

## 2022-08-04 NOTE — PATIENT INSTRUCTIONS
Check blood sugar every morning. If AM BG >180 on multiple occasions call clinic. Please check BG 2 hours after a meal this week. If BG >220 or any since episodes >300 call clinic.

## 2022-08-04 NOTE — PROGRESS NOTES
Lynda Cheatham is a [de-identified] y.o. male who was seen in clinic today (8/4/2022) for an acute visit. Assessment & Plan:   Below is the assessment and plan developed based on review of pertinent history, physical exam, labs, studies, and medications. 1. Polymyalgia rheumatica ([facility]  - History, exam most concerning for PMR. Significant proximal pain and weakness with distal pain and swelling. Will ESR, CRP, CBC, BMP to confirm. Will obtain CK, ANNI, TSH for ddx of hypothyroidism, polymyositis, RA, SLE.   - Start prednisone taper. Discussed his prior side effect of constipation and advised he stop colestipol if this occurs and consider adding senna, miralax pending severity of constipation. - no GCA sx currently, advised to monitor for this  - ok to use NSAIDS sparingly, advised of side effect of GIB, stomach ulcer. Advised to discontinue once steroids take effect.   - RTC 2 weeks, wife will let us know how he is doing via 5to1 portal message next week  -     SED RATE (ESR); Future  -     CRP, HIGH SENSITIVITY; Future  -     METABOLIC PANEL, BASIC; Future  -     RHEUMASSURE; Future  -     CK; Future  -     ANNI, DIRECT, W/REFLEX; Future  -     CBC WITH AUTOMATED DIFF; Future  -     TSH 3RD GENERATION; Future  -     predniSONE (DELTASONE) 10 mg tablet; Take 40 mg by mouth daily (with breakfast) for 3 days, THEN 30 mg daily (with breakfast) for 3 days, THEN 20 mg daily (with breakfast) for 3 days, THEN 10 mg daily (with breakfast) for 3 days, THEN 5 mg daily (with breakfast) for 4 days. Indications: muscle pain and stiffness in the shoulder, neck and pelvis, Normal, Disp-32 Tablet, R-0    2. Diabetes mellitus due to underlying condition with hyperglycemia, without long-term current use of insulin ([facility]  Will need to keep an eye on BG while on prednisone. Check BG AM fasting and 2hs postprandial. I am sure he will experience hyperglycemia. OK with BG up to 300 if he is sx free.  He will use 5to1 to message if he is running higher than this. -     metFORMIN ER (GLUCOPHAGE XR) 500 mg tablet; Take 1 Tablet by mouth two (2) times a day. Indications: type 2 diabetes mellitus, Normal, Disp-360 Tablet, R-1  -     glucose blood VI test strips (True Metrix Glucose Test Strip) strip; Test blood sugars daily. DX: E11.40, E11.65, Normal, Disp-100 Strip, R-1    3. Weight loss  Likely due to rheumatologic inflammation. Will continue to follow.   -     TSH 3RD GENERATION; Future    Subjective:   Niya Flores was seen today for Joint Pain    Here today with his wife, who assists with history. 1 month ago he began having daylin pain in R elbow that radiated to 4th and 5th finger. Had was swollen as well. He went to the ED and was diagnosed with medial epicondylitis and was sent to orthopedics. Ortho examined the hand and took xrays of the R hand, diagnosed him with arthritis. The orthopedist did not have anything to offer. He has been given meloxicam 7.5 mg x30d and it did not help. Saw cardiologist because of the swelling in his hands and ankles. He saw his cardiologist and swelling is not felt to be related to the heart. Cardiology did prescribe lasix 20mg daily for several days without any improvement. Taking advil for pain and advil PM for sleep. This helps his pain. Has hx UC but has not seen blood in stool. No abdominal pain. Having pain in his shoulders, hips, knees, ankles, low back. Joints are sore and stiff. Difficult rising out of the chair for the last month. BL ankle swelling and hand swelling as well. He has had issues with dropping things and balance for about a year. Has lost 13 lbs in the last 6 months but his appetite has stayed consistent. No fevers, chills. No jaw pain or neck pain. No changes in his visit. No headache.        Patient Active Problem List   Diagnosis Code    Essential hypertension, benign I10    CAD (coronary artery disease) I25.10    DM (diabetes mellitus) type II controlled, neurological manifestation ([facility]) E11.49    Pure hypercholesterolemia E78.00    Recurrent major depressive disorder ([facility] F33.9    Chronic back pain M54.9, G89.29    UC (ulcerative colitis) ([facility] [facility] K51.90    Pelvic kidney Q63.2    Scoliosis M41.9    Kidney stone N20.0    CARROL (obstructive sleep apnea) G47.33    Squamous cell carcinoma of tongue ([facility]) C02.9       Prior to Admission medications    Medication Sig Start Date End Date Taking? Authorizing Provider   aspirin delayed-release 81 mg tablet Take 81 mg by mouth daily. Yes Provider, Historical   colestipoL (COLESTID) 1 gram tablet Take 3 g by mouth daily. Yes Provider, Historical   metFORMIN ER (GLUCOPHAGE XR) 500 mg tablet TAKE 1 TABLET BY MOUTH EVERY MORNING & TAKE 2 TABLETS BY MOUTH DAILY IN THE EVENING 3/15/22  Yes Tiffanie Boateng MD   simvastatin (ZOCOR) 40 mg tablet Take 1 Tablet by mouth nightly. 3/15/22  Yes Tiffanie Boateng MD   empagliflozin (Jardiance) 10 mg tablet TAKE 1 TABLET BY MOUTH EVERY DAY 3/15/22  Yes Tiffanie Boateng MD   metoprolol tartrate (LOPRESSOR) 25 mg tablet Take 0.5 Tablets by mouth two (2) times a day. 3/15/22  Yes Tiffanie Boateng MD   sertraline (ZOLOFT) 50 mg tablet TAKE 1 AND 1/2 TABLETS BY MOUTH EVERY DAY 3/15/22  Yes Tiffanie Boateng MD   gabapentin (NEURONTIN) 100 mg capsule Take 1 Capsule by mouth three (3) times daily. Max Daily Amount: 300 mg. TAKE 1 CAPSULE BY MOUTH THREE TIMES DAILY 3/15/22  Yes Tiffanie Boateng MD   mesalamine (LIALDA) 1.2 gram delayed release tablet Take  by mouth. 4 tablets once daily   Yes Provider, Historical   cholecalciferol, vitamin D3, (VITAMIN D3 PO) Take 2,000 Units by mouth daily. Yes Provider, Historical   vitB6/mag cit,ox/potassium cit (THERALITH XR PO) Take 2 Caps by mouth two (2) times a day. Yes Provider, Historical   MULTIVITAMIN PO Take 1 Tab by mouth daily.    Yes Provider, Historical   glucose blood VI test strips (True Metrix Glucose Test Strip) strip Test blood sugars daily. DX: E11.40, E11.65  Patient not taking: Reported on 3/15/2022 3/13/20   Joe Peace MD   lancets misc Use daily as directed DX: E11.49  Patient not taking: Reported on 3/15/2022 3/14/19   Joe Peace MD   Blood-Glucose Meter (TRUE METRIX GLUCOSE METER) misc Use to test blood sugars daily. DX:  E11.49  Patient not taking: Reported on 3/15/2022 3/13/19   Joe Peace MD       Objective:   Visit Vitals  BP (!) 140/60 (BP 1 Location: Left upper arm, BP Patient Position: Sitting, BP Cuff Size: Adult)   Pulse 90   Temp 97.8 °F (36.6 °C) (Temporal)   Resp 16   Ht 6' (1.829 m)   Wt 200 lb (90.7 kg)   SpO2 98%   BMI 27.12 kg/m²       Physical Exam  Constitutional:       General: He is not in acute distress. HENT:      Head:      Comments: No TTP over temples  Pulmonary:      Effort: Pulmonary effort is normal.   Musculoskeletal:      Comments: Active shoulder ROM limited to 90 degrees abduction BL. No TTP over shoulders. BL wrists swollen with warmth. No TTP. ROM intact. BL hands swollen with 1+ edema. Can not make fist BL. 1st MCP BL warm. No TTP over hips. Hip flexors 3/5 - rocking motion to get up from chair. BL ankles and feet swollen with 2+ edema. Neurological:      General: No focal deficit present. Mental Status: He is alert and oriented to person, place, and time. Advised him to call back or return to office if symptoms worsen/change/persist.  Discussed expected course/resolution/complications of diagnosis in detail with patient.     Heavenly Tabares MD

## 2022-08-04 NOTE — PROGRESS NOTES
Verified name and birth date for privacy precautions. Chart reviewed in preparation for today's visit. Chief Complaint   Patient presents with    Joint Pain          There are no preventive care reminders to display for this patient.       Wt Readings from Last 3 Encounters:   08/04/22 200 lb (90.7 kg)   07/01/22 210 lb 1.6 oz (95.3 kg)   03/15/22 213 lb (96.6 kg)     Temp Readings from Last 3 Encounters:   08/04/22 97.8 °F (36.6 °C) (Temporal)   07/01/22 98 °F (36.7 °C)   03/15/22 97.5 °F (36.4 °C) (Temporal)     BP Readings from Last 3 Encounters:   08/04/22 (!) 140/60   07/01/22 (!) 144/65   03/15/22 121/73     Pulse Readings from Last 3 Encounters:   08/04/22 90   07/01/22 80   03/15/22 68         Learning Assessment:  :     Learning Assessment 3/15/2022 11/25/2014 3/19/2014   PRIMARY LEARNER Patient Patient Patient   HIGHEST LEVEL OF EDUCATION - PRIMARY LEARNER  GRADUATED HIGH SCHOOL OR GED GRADUATED HIGH SCHOOL OR GED -   BARRIERS PRIMARY LEARNER NONE HEARING -   [address] CAREGIVER No Yes -   [address]  NAME - [facility] LEVEL OF EDUCATION - 2 YEARS [facility]   PRIMARY LANGUAGE CO-LEARNER - ENGLISH -    NEED - No -   LEARNER PREFERENCE PRIMARY DEMONSTRATION DEMONSTRATION DEMONSTRATION   LEARNER PREFERENCE CO-LEARNER - DEMONSTRATION -   ANSWERED BY patient Patient patient     - - s   RELATIONSHIP SELF SELF SELF       Depression Screening:  :     3 most recent PHQ Screens 8/4/2022   PHQ Not Done -   Little interest or pleasure in doing things Not at all   Feeling down, depressed, irritable, or hopeless Not at all   Total Score PHQ 2 0   Trouble falling or staying asleep, or sleeping too much -   Feeling tired or having little energy -   Poor appetite, weight loss, or overeating -   Feeling bad about yourself - or that you are a failure or have let yourself or your family down -   Trouble concentrating on things such as school, work, reading, or watching TV -   Moving or speaking so slowly that other people could have noticed; or the opposite being so fidgety that others notice -   Thoughts of being better off dead, or hurting yourself in some way -   PHQ 9 Score -   How difficult have these problems made it for you to do your work, take care of your home and get along with others -       Fall Risk Assessment:  :     Fall Risk Assessment, last 12 mths 8/4/2022   Able to walk? Yes   Fall in past 12 months? 0   Do you feel unsteady? 1   Are you worried about falling 1   Is TUG test greater than 12 seconds? -   Is the gait abnormal? -   Number of falls in past 12 months -   Fall with injury? -       Abuse Screening:  :     Abuse Screening Questionnaire 8/4/2022 3/15/2022 3/15/2022 3/15/2021 3/13/2020 3/13/2019 2/20/2018   Do you ever feel afraid of your partner? N N N N N N N   Are you in a relationship with someone who physically or mentally threatens you? N N N N N N N   Is it safe for you to go home?  Bia Lomas

## 2022-08-05 LAB
ANION GAP SERPL CALC-SCNC: 11 MMOL/L (ref 5–15)
BASOPHILS # BLD: 0.1 K/UL (ref 0–0.1)
BASOPHILS NFR BLD: 0 % (ref 0–1)
BUN SERPL-MCNC: 14 MG/DL (ref 6–20)
BUN/CREAT SERPL: 15 (ref 12–20)
CALCIUM SERPL-MCNC: 9.8 MG/DL (ref 8.5–10.1)
CHLORIDE SERPL-SCNC: 103 MMOL/L (ref 97–108)
CK SERPL-CCNC: 24 U/L (ref 39–308)
CO2 SERPL-SCNC: 25 MMOL/L (ref 21–32)
CREAT SERPL-MCNC: 0.91 MG/DL (ref 0.7–1.3)
CRP SERPL HS-MCNC: >9.5 MG/L
DIFFERENTIAL METHOD BLD: ABNORMAL
EOSINOPHIL # BLD: 0.1 K/UL (ref 0–0.4)
EOSINOPHIL NFR BLD: 1 % (ref 0–7)
ERYTHROCYTE [DISTWIDTH] IN BLOOD BY AUTOMATED COUNT: 13.2 % (ref 11.5–14.5)
ERYTHROCYTE [SEDIMENTATION RATE] IN BLOOD: 38 MM/HR (ref 0–20)
GLUCOSE SERPL-MCNC: 141 MG/DL (ref 65–100)
HCT VFR BLD AUTO: 42.1 % (ref 36.6–50.3)
HGB BLD-MCNC: 12.9 G/DL (ref 12.1–17)
IMM GRANULOCYTES # BLD AUTO: 0 K/UL (ref 0–0.04)
IMM GRANULOCYTES NFR BLD AUTO: 0 % (ref 0–0.5)
LYMPHOCYTES # BLD: 1.5 K/UL (ref 0.8–3.5)
LYMPHOCYTES NFR BLD: 12 % (ref 12–49)
MCH RBC QN AUTO: 28 PG (ref 26–34)
MCHC RBC AUTO-ENTMCNC: 30.6 G/DL (ref 30–36.5)
MCV RBC AUTO: 91.5 FL (ref 80–99)
MONOCYTES # BLD: 1 K/UL (ref 0–1)
MONOCYTES NFR BLD: 8 % (ref 5–13)
NEUTS SEG # BLD: 9.4 K/UL (ref 1.8–8)
NEUTS SEG NFR BLD: 79 % (ref 32–75)
NRBC # BLD: 0 K/UL (ref 0–0.01)
NRBC BLD-RTO: 0 PER 100 WBC
PLATELET # BLD AUTO: 421 K/UL (ref 150–400)
PMV BLD AUTO: 9.8 FL (ref 8.9–12.9)
POTASSIUM SERPL-SCNC: 4.4 MMOL/L (ref 3.5–5.1)
RBC # BLD AUTO: 4.6 M/UL (ref 4.1–5.7)
SODIUM SERPL-SCNC: 139 MMOL/L (ref 136–145)
TSH SERPL DL<=0.05 MIU/L-ACNC: 1.64 UIU/ML (ref 0.36–3.74)
WBC # BLD AUTO: 12 K/UL (ref 4.1–11.1)

## 2022-08-06 DIAGNOSIS — F33.42 RECURRENT MAJOR DEPRESSIVE DISORDER, IN FULL REMISSION (HCC): ICD-10-CM

## 2022-08-07 RX ORDER — SERTRALINE HYDROCHLORIDE 50 MG/1
TABLET, FILM COATED ORAL
Qty: 135 TABLET | Refills: 1 | Status: SHIPPED | OUTPATIENT
Start: 2022-08-07

## 2022-08-08 LAB — ANA SER QL: NEGATIVE

## 2022-08-08 NOTE — TELEPHONE ENCOUNTER
Future Appointments   Date Time Provider Starla Floridalma   8/18/2022  1:00 PM Carley Bradford MD [facility] JEVON BS AMB   9/16/2022 10:00 AM MD WILFREDO Rowland BS AMB   3/17/2023 11:30 AM MD WILFREDO Rowland BS AMB

## 2022-08-11 DIAGNOSIS — I10 ESSENTIAL HYPERTENSION, BENIGN: ICD-10-CM

## 2022-08-11 DIAGNOSIS — E11.42 CONTROLLED TYPE 2 DIABETES MELLITUS WITH DIABETIC POLYNEUROPATHY, WITHOUT LONG-TERM CURRENT USE OF INSULIN (HCC): ICD-10-CM

## 2022-08-11 DIAGNOSIS — I25.10 CORONARY ARTERY DISEASE INVOLVING NATIVE CORONARY ARTERY OF NATIVE HEART WITHOUT ANGINA PECTORIS: ICD-10-CM

## 2022-08-11 DIAGNOSIS — E78.00 PURE HYPERCHOLESTEROLEMIA: ICD-10-CM

## 2022-08-11 RX ORDER — SIMVASTATIN 40 MG/1
40 TABLET, FILM COATED ORAL
Qty: 90 TABLET | Refills: 1 | Status: SHIPPED | OUTPATIENT
Start: 2022-08-11

## 2022-08-11 RX ORDER — METOPROLOL TARTRATE 25 MG/1
TABLET, FILM COATED ORAL
Qty: 180 TABLET | Refills: 0 | Status: SHIPPED | OUTPATIENT
Start: 2022-08-11

## 2022-08-12 DIAGNOSIS — E08.65 DIABETES MELLITUS DUE TO UNDERLYING CONDITION WITH HYPERGLYCEMIA, WITHOUT LONG-TERM CURRENT USE OF INSULIN (HCC): ICD-10-CM

## 2022-08-12 RX ORDER — METFORMIN HYDROCHLORIDE 500 MG/1
500 TABLET, EXTENDED RELEASE ORAL 2 TIMES DAILY
Qty: 360 TABLET | Refills: 2 | Status: SHIPPED | OUTPATIENT
Start: 2022-08-12 | End: 2022-08-12 | Stop reason: SDUPTHER

## 2022-08-12 RX ORDER — METFORMIN HYDROCHLORIDE 500 MG/1
TABLET, EXTENDED RELEASE ORAL
Qty: 30 TABLET | OUTPATIENT
Start: 2022-08-12

## 2022-08-12 RX ORDER — METFORMIN HYDROCHLORIDE 500 MG/1
1000 TABLET, EXTENDED RELEASE ORAL 2 TIMES DAILY
Qty: 360 TABLET | Refills: 2 | Status: SHIPPED | OUTPATIENT
Start: 2022-08-12

## 2022-08-12 RX ORDER — METFORMIN HYDROCHLORIDE 500 MG/1
500 TABLET, EXTENDED RELEASE ORAL 2 TIMES DAILY
Qty: 360 TABLET | Refills: 2 | Status: SHIPPED | OUTPATIENT
Start: 2022-08-12 | End: 2022-08-12

## 2022-08-18 ENCOUNTER — OFFICE VISIT (OUTPATIENT)
Dept: INTERNAL MEDICINE CLINIC | Age: 82
End: 2022-08-18
Payer: MEDICARE

## 2022-08-18 VITALS
RESPIRATION RATE: 16 BRPM | OXYGEN SATURATION: 97 % | HEART RATE: 83 BPM | TEMPERATURE: 97.8 F | SYSTOLIC BLOOD PRESSURE: 110 MMHG | WEIGHT: 194.2 LBS | DIASTOLIC BLOOD PRESSURE: 70 MMHG | BODY MASS INDEX: 26.34 KG/M2

## 2022-08-18 DIAGNOSIS — M35.3 POLYMYALGIA RHEUMATICA (HCC): Primary | ICD-10-CM

## 2022-08-18 DIAGNOSIS — G56.21 ULNAR NEUROPATHY AT ELBOW OF RIGHT UPPER EXTREMITY: ICD-10-CM

## 2022-08-18 DIAGNOSIS — E11.49 CONTROLLED TYPE 2 DIABETES MELLITUS WITH OTHER NEUROLOGIC COMPLICATION, WITHOUT LONG-TERM CURRENT USE OF INSULIN (HCC): ICD-10-CM

## 2022-08-18 LAB
14.3.3 ETA, RHEUM. ARTHRITIS: <0.2 NG/ML
CCP IGA+IGG SERPL IA-ACNC: 28 UNITS
RHEUMATOID FACT SERPL-ACNC: <14 UNITS/ML

## 2022-08-18 PROCEDURE — 99214 OFFICE O/P EST MOD 30 MIN: CPT | Performed by: STUDENT IN AN ORGANIZED HEALTH CARE EDUCATION/TRAINING PROGRAM

## 2022-08-18 RX ORDER — PREDNISONE 5 MG/1
5 TABLET ORAL DAILY
Qty: 30 TABLET | Refills: 0 | Status: SHIPPED | OUTPATIENT
Start: 2022-08-18 | End: 2022-08-22 | Stop reason: SDUPTHER

## 2022-08-18 NOTE — PROGRESS NOTES
Mini Portillo is a [de-identified] y.o. male who was seen in clinic today (8/18/2022) for an acute visit. Assessment & Plan:   Below is the assessment and plan developed based on review of pertinent history, physical exam, labs, studies, and medications. 1. Polymyalgia rheumatica ([address]  - Improving after steroid taper. No sx GCA. Will continue maintenance prednisone 5mg for 1 month. Has visit with Dr Jenni Barrera in 1 month, if doing well can discuss taper. Can consider checking inflammatory makers before tapering off as monitor of disease activity.   - His decreased ROM in R hand (unable to make a closed fist) may be related to the PMR based on temporal association and this happened with his L hand as well but it has improved on steroids. Offered OT but he would like to monitor for improvement for a bit longer.  -     predniSONE (DELTASONE) 5 mg tablet; Take 1 Tablet by mouth daily. , Normal, Disp-30 Tablet, R-0    2. Ulnar neuropathy at elbow of right upper extremity  Sx are very minimally impacting his function. Will continue to monitor and may refer to orthopedics for decompression if it becomes worse. 3. DM  2hr postprandial BG are controlled. Continue metformin 1g BID and Jardiance. Can decrease BG checks to 2x/week. Subjective:   Arthur Yadav was seen today for Arthritis (  polymyalgia)    Feels 95% better. Still not able to close his R hand and having a bit of pain there but otherwise no swelling or pain. Down to pred 5 since 8/17. No issues with constipation with prednisone this time. BG over the last couples days has been <200:  8/16 2hr after breakfast 174, 2 hours after dinner 145  8/17 2 hours after dinner 195  8/18 2hr after breakfast 132    When his PMR was beginning to flare and he experienced a shooting pain from his R elbow and to the 4th and 5th fingers. Since being on the prednisone there is no shooting pain but there is numbness in the fingers.  He has a hard time describing the symptoms but it feels different than the rest of his fingers. No HA, vision changes/blackout, jaw claudication. Patient Active Problem List   Diagnosis Code    Essential hypertension, benign I10    CAD (coronary artery disease) I25.10    DM (diabetes mellitus) type II controlled, neurological manifestation ([facility] E11.49    Pure hypercholesterolemia E78.00    Recurrent major depressive disorder ([facility] F33.9    Chronic back pain M54.9, G89.29    UC (ulcerative colitis) ([facility] K51.90    Pelvic kidney Q63.2    Scoliosis M41.9    Kidney stone N20.0    CARROL (obstructive sleep apnea) G47.33    Squamous cell carcinoma of tongue ([facility]) C02.9       Prior to Admission medications    Medication Sig Start Date End Date Taking? Authorizing Provider   metFORMIN ER (GLUCOPHAGE XR) 500 mg tablet Take 2 Tablets by mouth two (2) times a day. Indications: type 2 diabetes mellitus 8/12/22  Yes Molly Rosales MD   metoprolol tartrate (LOPRESSOR) 25 mg tablet TAKE 1/2 TABLET BY MOUTH TWICE A DAY 8/11/22  Yes Epifanio Dawson MD   simvastatin (ZOCOR) 40 mg tablet TAKE 1 TABLET BY MOUTH NIGHTLY 8/11/22  Yes Epifanio Dawson MD   sertraline (ZOLOFT) 50 mg tablet TAKE 1.5 TABLETS BY MOUTH EVERY DAY 8/7/22  Yes Epifanio Dawson MD   predniSONE (DELTASONE) 10 mg tablet Take 40 mg by mouth daily (with breakfast) for 3 days, THEN 30 mg daily (with breakfast) for 3 days, THEN 20 mg daily (with breakfast) for 3 days, THEN 10 mg daily (with breakfast) for 3 days, THEN 5 mg daily (with breakfast) for 4 days. Indications: muscle pain and stiffness in the shoulder, neck and pelvis 8/4/22 8/20/22 Yes Molly Rosales MD   aspirin delayed-release 81 mg tablet Take 81 mg by mouth daily. Yes Provider, Historical   colestipoL (COLESTID) 1 gram tablet Take 3 g by mouth daily.    Yes Provider, Historical   empagliflozin (Jardiance) 10 mg tablet TAKE 1 TABLET BY MOUTH EVERY DAY 3/15/22  Yes Epifanio Dawson MD   gabapentin (NEURONTIN) 100 mg capsule Take 1 Capsule by mouth three (3) times daily. Max Daily Amount: 300 mg. TAKE 1 CAPSULE BY MOUTH THREE TIMES DAILY 3/15/22  Yes Miya Hobson MD   mesalamine (LIALDA) 1.2 gram delayed release tablet Take  by mouth. 4 tablets once daily   Yes Provider, Historical   cholecalciferol, vitamin D3, (VITAMIN D3 PO) Take 2,000 Units by mouth daily. Yes Provider, Historical   vitB6/mag cit,ox/potassium cit (THERALITH XR PO) Take 2 Caps by mouth two (2) times a day. Yes Provider, Historical   MULTIVITAMIN PO Take 1 Tab by mouth daily. Yes Provider, Historical   glucose blood VI test strips (True Metrix Glucose Test Strip) strip Test blood sugars daily. DX: E11.40, E11.65 8/4/22   Mi Garcia MD   lancets misc Use daily as directed DX: E11.49  Patient not taking: Reported on 3/15/2022 3/14/19   Miya Hobson MD   Blood-Glucose Meter (TRUE METRIX GLUCOSE METER) misc Use to test blood sugars daily. DX:  E11.49  Patient not taking: Reported on 3/15/2022 3/13/19   Miya Hobson MD       Objective:   Visit Vitals  /70 (BP 1 Location: Right arm)   Pulse 83   Temp 97.8 °F (36.6 °C) (Temporal)   Resp 16   Wt 194 lb 3.2 oz (88.1 kg)   SpO2 97%   BMI 26.34 kg/m²       Physical Exam  Constitutional:       General: He is not in acute distress. Cardiovascular:      Rate and Rhythm: Normal rate and regular rhythm. Pulmonary:      Effort: No respiratory distress. Breath sounds: Normal breath sounds. Musculoskeletal:         General: No swelling. Right lower leg: No edema. Left lower leg: No edema. Comments:  strength 4/5 BL. Interosseous muscles strength 5/5. Unable to make complete fist with R hand. Neurological:      Mental Status: He is alert. Advised him to call back or return to office if symptoms worsen/change/persist.  Discussed expected course/resolution/complications of diagnosis in detail with patient.     Mac Horne MD

## 2022-08-18 NOTE — PROGRESS NOTES
Chief Complaint   Patient presents with    Arthritis       polymyalgia   Follow up  Reviewed record in preparation for visit and have obtained necessary documentation. Identified pt with two pt identifiers(name and ).       Health Maintenance Due   Topic    Eye Exam Retinal or Dilated          Chief Complaint   Patient presents with    Arthritis       polymyalgia        Wt Readings from Last 3 Encounters:   22 194 lb 3.2 oz (88.1 kg)   22 200 lb (90.7 kg)   22 210 lb 1.6 oz (95.3 kg)     Temp Readings from Last 3 Encounters:   22 97.8 °F (36.6 °C) (Temporal)   22 97.8 °F (36.6 °C) (Temporal)   22 98 °F (36.7 °C)     BP Readings from Last 3 Encounters:   22 110/70   22 (!) 140/60   22 (!) 144/65     Pulse Readings from Last 3 Encounters:   22 83   22 90   22 80           Learning Assessment:  :     Learning Assessment 3/15/2022 2014 3/19/2014   PRIMARY LEARNER Patient Patient Patient   HIGHEST LEVEL OF EDUCATION - PRIMARY LEARNER  GRADUATED HIGH SCHOOL OR GED GRADUATED HIGH SCHOOL OR GED -   BARRIERS PRIMARY LEARNER NONE HEARING -   [address]  CAREGIVER No Yes -   [address]  NAME - Charles Maciel LEVEL OF EDUCATION - 2 YEARS OF COLLEGE -   [facility]   PRIMARY LANGUAGE CO-LEARNER - ENGLISH -    NEED - No -   LEARNER PREFERENCE PRIMARY DEMONSTRATION DEMONSTRATION DEMONSTRATION   LEARNER PREFERENCE CO-LEARNER - DEMONSTRATION -   ANSWERED BY patient Patient patient     - - s   RELATIONSHIP SELF SELF SELF       Depression Screening:  :     3 most recent PHQ Screens 2022   PHQ Not Done -   Little interest or pleasure in doing things Not at all   Feeling down, depressed, irritable, or hopeless -   Total Score PHQ 2 -   Trouble falling or staying asleep, or sleeping too much -   Feeling tired or having little energy -   Poor appetite, weight loss, or overeating -   Feeling bad about yourself - or that you are a failure or have let yourself or your family down -   Trouble concentrating on things such as school, work, reading, or watching TV -   Moving or speaking so slowly that other people could have noticed; or the opposite being so fidgety that others notice -   Thoughts of being better off dead, or hurting yourself in some way -   PHQ 9 Score -   How difficult have these problems made it for you to do your work, take care of your home and get along with others -       Fall Risk Assessment:  :     Fall Risk Assessment, last 12 mths 8/18/2022   Able to walk? Yes   Fall in past 12 months? 0   Do you feel unsteady? 0   Are you worried about falling 0   Is TUG test greater than 12 seconds? -   Is the gait abnormal? -   Number of falls in past 12 months -   Fall with injury? -       Abuse Screening:  :     Abuse Screening Questionnaire 8/4/2022 3/15/2022 3/15/2022 3/15/2021 3/13/2020 3/13/2019 2/20/2018   Do you ever feel afraid of your partner? N N N N N N N   Are you in a relationship with someone who physically or mentally threatens you? N N N N N N N   Is it safe for you to go home? Y Y Y Y Y Y Y       Coordination of Care Questionnaire:  :     1) Have you been to an emergency room, urgent care clinic since your last visit? no   Hospitalized since your last visit? no             2) Have you seen or consulted any other health care providers outside of [address] [place] [address] since your last visit? no  (Include any pap smears or colon screenings in this section.)    3) Do you have an Advance Directive on file? no    4) Are you interested in receiving information on Advance Directives? NO      Patient is accompanied by  spouse I have received verbal consent from Miriam Amador to discuss any/all medical information while they are present in the room. Reviewed record  In preparation for visit and have obtained necessary documentation.

## 2022-08-20 DIAGNOSIS — E11.42 CONTROLLED TYPE 2 DIABETES MELLITUS WITH DIABETIC POLYNEUROPATHY, WITHOUT LONG-TERM CURRENT USE OF INSULIN (HCC): ICD-10-CM

## 2022-08-22 DIAGNOSIS — M35.3 POLYMYALGIA RHEUMATICA (HCC): ICD-10-CM

## 2022-08-22 RX ORDER — PREDNISONE 10 MG/1
10 TABLET ORAL DAILY
Qty: 30 TABLET | Refills: 1 | Status: SHIPPED | OUTPATIENT
Start: 2022-08-22 | End: 2022-09-16 | Stop reason: SDUPTHER

## 2022-08-22 NOTE — TELEPHONE ENCOUNTER
Future Appointments   Date Time Provider Starla Hedrick   9/16/2022 10:00 AM Tia Tinajero MD [facility] JEVON BS AMB   3/17/2023 11:30 AM MD WILFREDO Palumbo BS AMB

## 2022-08-28 ENCOUNTER — HOSPITAL ENCOUNTER (EMERGENCY)
Age: 82
Discharge: HOME | End: 2022-08-28
Attending: EMERGENCY MEDICINE
Payer: MEDICARE

## 2022-08-28 VITALS
TEMPERATURE: 98.2 F | RESPIRATION RATE: 20 BRPM | WEIGHT: 192.24 LBS | BODY MASS INDEX: 26.07 KG/M2 | DIASTOLIC BLOOD PRESSURE: 72 MMHG | SYSTOLIC BLOOD PRESSURE: 138 MMHG | HEART RATE: 82 BPM | OXYGEN SATURATION: 96 %

## 2022-08-28 DIAGNOSIS — T16.1XXA FOREIGN BODY OF RIGHT EAR, INITIAL ENCOUNTER: Primary | ICD-10-CM

## 2022-08-28 PROCEDURE — 99282 EMERGENCY DEPT VISIT SF MDM: CPT

## 2022-08-28 NOTE — ED NOTES
Pain assessment on discharge was   Condition Stable  Patient discharged to home  Patient education was completed  Education taught to patient  Teaching method used was handout and verbal  Understanding of teaching was good  Patient was discharged ambulatory  Discharged with family  Valuables were given to: patient/family remained in possession of belongings during stay.

## 2022-08-28 NOTE — ED NOTES
Soft rubber end piece to hearing aid is visible ia otoscope in right ear canal. End piece removed with alligator forceps. No signs of trauma/ bleeding/swelling is noted to ear canal/ear drum. Patient tolerated well.

## 2022-08-28 NOTE — ED PROVIDER NOTES
81-year-old male with history of diabetes, hypertension presents with complaints of question part of hearing aid stuck in his right ear. Nurse was able to remove cushion with alligator forceps prior to my evaluation. No current complaints. Past Medical History:   Diagnosis Date    BPH (benign prostatic hyperplasia)     improved after TURP    CAD (coronary artery disease) 2005    MI, PCI/stent to mid CX 6/1/02, CABG x2 on 9/6/02 (LIMA to LAD & SVG to CX)    Calculus of kidney     due to pelvic kidney    Cancer ([address] 11/2020    TONGUE    Chronic back pain     scoliosis & OA    Depression     Diabetes (HCC)     Hx of motion sickness     Hypertension     Ill-defined condition     ULCERATIVE COLITIS    Obstructive sleep apnea 2018    USES BIPAP    CARROL (obstructive sleep apnea) 6/10/2016    uses CPAP    Other and unspecified hyperlipidemia        Past Surgical History:   Procedure Laterality Date    COLONOSCOPY N/A 9/27/2017    active colitis on biopsy.   Performed by Marc Villanueva MD at [facility] ENDOSCOPY    COLONOSCOPY Left 9/19/2018    active colitis - performed by Marc Villanueva MD at [facility] ENDOSCOPY    COLONOSCOPY N/A 5/15/2020    colitis, no polyps - performed by Javi Marroquin MD at [facility] ENDOSCOPY    COLONOSCOPY N/A 10/28/2021    COLONOSCOPY (URGENT)   :- performed by Javi Marroquin MD at [address] [place] [address], COLON, DIAGNOSTIC  6/3/14    Dr. Jeff Tinoco recommended q2 surveil    HX CATARACT REMOVAL  2007, 2011    both eyes, laser repair to left eye in 2011    HX COLONOSCOPY  06/15/2016    path only- crypt destructive colitis/multiple colonoscopies    HX CORONARY ARTERY BYPASS GRAFT  2002    2 vessel    HX HEART CATHETERIZATION  2002    HX HEENT Right 11/09/2020    partial glossectomy - Invasive squamous cell carcinoma, moderately differentiated, keratinizing type    HX HEENT Right 03/22/2021    repeat partial glossectomy    HX HERNIA REPAIR      Abdominal Hernia    HX OTHER SURGICAL  11/11/15 advance cystoscopy    HX TONSILLECTOMY      HX TURP      HX UROLOGICAL      TUNA procedure    HX UROLOGICAL  12    cystoscopy to remove kidney stone x3    LITHOTRIPSY           Family History:   Problem Relation Age of Onset    Heart Disease Mother     Heart Disease Father     Diabetes Brother     Heart Disease Brother     OSTEOARTHRITIS Brother         back - stenosis    Kidney cancer Brother     Heart Disease Other         Coronary Artery Disease    Ulcerative Colitis Sister     Heart Disease Sister     Cancer Brother         intestinal    Anesth Problems Neg Hx        Social History     Socioeconomic History    Marital status:      Spouse name: Not on file    Number of children: Not on file    Years of education: Not on file    Highest education level: Not on file   Occupational History    Not on file   Tobacco Use    Smoking status: Former     Types: Cigarettes     Quit date: 1990     Years since quittin.6    Smokeless tobacco: Never   Vaping Use    Vaping Use: Never used   Substance and Sexual Activity    Alcohol use: No     Comment: none    Drug use: No    Sexual activity: Not Currently     Partners: Female   Other Topics Concern    Not on file   Social History Narrative    Not on file     Social Determinants of Health     Financial Resource Strain: Not on file   Food Insecurity: Not on file   Transportation Needs: Not on file   Physical Activity: Not on file   Stress: Not on file   Social Connections: Not on file   Intimate Partner Violence: Not on file   Housing Stability: Not on file         ALLERGIES: Apriso [mesalamine], Prednisone, and Sulfasalazine    Review of Systems   Constitutional:  Negative for chills and fever. HENT:          Ear foreign body   Respiratory:  Negative for cough and shortness of breath. Cardiovascular:  Negative for chest pain. Gastrointestinal:  Negative for abdominal pain, nausea and vomiting. Genitourinary:  Negative for dysuria and urgency. Skin:  Negative for wound. Neurological:  Negative for seizures and headaches. Vitals:    08/28/22 1438 08/28/22 1631   BP: (!) 134/59 138/72   Pulse: 89 82   Resp: 18 20   Temp: 98.2 °F (36.8 °C)    SpO2: 96%    Weight: 87.2 kg (192 lb 3.9 oz)             Physical Exam  Constitutional:       Appearance: He is well-developed. HENT:      Head: Normocephalic and atraumatic. Right Ear: Tympanic membrane normal.      Left Ear: Tympanic membrane normal.   Cardiovascular:      Rate and Rhythm: Normal rate. Pulmonary:      Effort: Pulmonary effort is normal. No respiratory distress. Musculoskeletal:         General: Normal range of motion. Cervical back: Normal range of motion. Skin:     General: Skin is warm and dry. Neurological:      Mental Status: He is alert and oriented to person, place, and time. MDM  Number of Diagnoses or Management Options  Foreign body of right ear, initial encounter  Diagnosis management comments: 73-year-old male presents with complaints of hearing aid cushion stuck in right ear canal.  Removed by nurse. Procedures               5:06 PM  Patient's results have been reviewed with them. Patient and/or family have verbally conveyed their understanding and agreement of the patient's signs, symptoms, diagnosis, treatment and prognosis and additionally agree to follow up as recommended or return to the Emergency Room should their condition change prior to follow-up. Discharge instructions have also been provided to the patient with some educational information regarding their diagnosis as well a list of reasons why they would want to return to the ER prior to their follow-up appointment should their condition change.

## 2022-09-06 DIAGNOSIS — E11.49 CONTROLLED TYPE 2 DIABETES MELLITUS WITH OTHER NEUROLOGIC COMPLICATION, WITHOUT LONG-TERM CURRENT USE OF INSULIN (HCC): Primary | ICD-10-CM

## 2022-09-06 NOTE — PROGRESS NOTES
Future Appointments   Date Time Provider Starla Hedrick   9/16/2022 10:00 AM Cristina Hightower MD [facility] JEVON BS AMB   3/17/2023 11:30 AM MD WILFREDO Berrios BS AMB

## 2022-09-10 LAB
ALBUMIN SERPL-MCNC: 4 G/DL (ref 3.7–4.7)
ALBUMIN/GLOB SERPL: 1.6 {RATIO} (ref 1.2–2.2)
ALP SERPL-CCNC: 82 IU/L (ref 44–121)
ALT SERPL-CCNC: 15 IU/L (ref 0–44)
AST SERPL-CCNC: 18 IU/L (ref 0–40)
BASOPHILS # BLD AUTO: 0.1 X10E3/UL (ref 0–0.2)
BASOPHILS NFR BLD AUTO: 1 %
BILIRUB SERPL-MCNC: 0.5 MG/DL (ref 0–1.2)
BUN SERPL-MCNC: 10 MG/DL (ref 8–27)
BUN/CREAT SERPL: 16 (ref 10–24)
CALCIUM SERPL-MCNC: 9.6 MG/DL (ref 8.6–10.2)
CHLORIDE SERPL-SCNC: 103 MMOL/L (ref 96–106)
CO2 SERPL-SCNC: 23 MMOL/L (ref 20–29)
CREAT SERPL-MCNC: 0.64 MG/DL (ref 0.76–1.27)
EGFR: 96 ML/MIN/1.73
EOSINOPHIL # BLD AUTO: 0.1 X10E3/UL (ref 0–0.4)
EOSINOPHIL NFR BLD AUTO: 1 %
ERYTHROCYTE [DISTWIDTH] IN BLOOD BY AUTOMATED COUNT: 13.8 % (ref 11.6–15.4)
EST. AVERAGE GLUCOSE BLD GHB EST-MCNC: 157 MG/DL
GLOBULIN SER CALC-MCNC: 2.5 G/DL (ref 1.5–4.5)
GLUCOSE SERPL-MCNC: 119 MG/DL (ref 65–99)
HBA1C MFR BLD: 7.1 % (ref 4.8–5.6)
HCT VFR BLD AUTO: 42.6 % (ref 37.5–51)
HGB BLD-MCNC: 13.5 G/DL (ref 13–17.7)
IMM GRANULOCYTES # BLD AUTO: 0 X10E3/UL (ref 0–0.1)
IMM GRANULOCYTES NFR BLD AUTO: 0 %
LYMPHOCYTES # BLD AUTO: 2.1 X10E3/UL (ref 0.7–3.1)
LYMPHOCYTES NFR BLD AUTO: 17 %
MCH RBC QN AUTO: 27.4 PG (ref 26.6–33)
MCHC RBC AUTO-ENTMCNC: 31.7 G/DL (ref 31.5–35.7)
MCV RBC AUTO: 87 FL (ref 79–97)
MONOCYTES # BLD AUTO: 0.9 X10E3/UL (ref 0.1–0.9)
MONOCYTES NFR BLD AUTO: 7 %
NEUTROPHILS # BLD AUTO: 9.6 X10E3/UL (ref 1.4–7)
NEUTROPHILS NFR BLD AUTO: 74 %
PLATELET # BLD AUTO: 339 X10E3/UL (ref 150–450)
POTASSIUM SERPL-SCNC: 4.2 MMOL/L (ref 3.5–5.2)
PROT SERPL-MCNC: 6.5 G/DL (ref 6–8.5)
RBC # BLD AUTO: 4.92 X10E6/UL (ref 4.14–5.8)
SODIUM SERPL-SCNC: 142 MMOL/L (ref 134–144)
WBC # BLD AUTO: 12.8 X10E3/UL (ref 3.4–10.8)

## 2022-09-12 DIAGNOSIS — E11.42 CONTROLLED TYPE 2 DIABETES MELLITUS WITH DIABETIC POLYNEUROPATHY, WITHOUT LONG-TERM CURRENT USE OF INSULIN (HCC): ICD-10-CM

## 2022-09-12 RX ORDER — GABAPENTIN 100 MG/1
CAPSULE ORAL
Qty: 270 CAPSULE | Refills: 1 | Status: SHIPPED | OUTPATIENT
Start: 2022-09-12

## 2022-09-13 NOTE — TELEPHONE ENCOUNTER
[facility] & [facility]  reviewed.       Last visit with me:  3/15/2022   Last refilled on: 6/13/22 - #270    Future Appointments   Date Time Provider Starla Del Reali   9/16/2022 10:00 AM MD Devin Tang BS AMB   3/17/2023 11:30 AM MD WILFREDO Tang BS AMB        Last PDMP Phoebe Reed as Reviewed:  Review User Review Instant Review Result   YIMI LUJAN 9/12/2022  8:41 PM Reviewed PDMP [1]

## 2022-09-16 ENCOUNTER — OFFICE VISIT (OUTPATIENT)
Dept: INTERNAL MEDICINE CLINIC | Age: 82
End: 2022-09-16
Payer: MEDICARE

## 2022-09-16 VITALS
WEIGHT: 194 LBS | TEMPERATURE: 97.9 F | RESPIRATION RATE: 18 BRPM | HEIGHT: 72 IN | BODY MASS INDEX: 26.28 KG/M2 | SYSTOLIC BLOOD PRESSURE: 116 MMHG | OXYGEN SATURATION: 97 % | HEART RATE: 92 BPM | DIASTOLIC BLOOD PRESSURE: 73 MMHG

## 2022-09-16 DIAGNOSIS — F33.42 RECURRENT MAJOR DEPRESSIVE DISORDER, IN FULL REMISSION (HCC): ICD-10-CM

## 2022-09-16 DIAGNOSIS — E11.49 CONTROLLED TYPE 2 DIABETES MELLITUS WITH OTHER NEUROLOGIC COMPLICATION, WITHOUT LONG-TERM CURRENT USE OF INSULIN (HCC): ICD-10-CM

## 2022-09-16 DIAGNOSIS — M35.3 POLYMYALGIA RHEUMATICA (HCC): Primary | ICD-10-CM

## 2022-09-16 DIAGNOSIS — I10 ESSENTIAL HYPERTENSION, BENIGN: ICD-10-CM

## 2022-09-16 DIAGNOSIS — E78.00 PURE HYPERCHOLESTEROLEMIA: ICD-10-CM

## 2022-09-16 DIAGNOSIS — I25.10 CORONARY ARTERY DISEASE INVOLVING NATIVE CORONARY ARTERY OF NATIVE HEART WITHOUT ANGINA PECTORIS: ICD-10-CM

## 2022-09-16 PROCEDURE — G8427 DOCREV CUR MEDS BY ELIG CLIN: HCPCS | Performed by: INTERNAL MEDICINE

## 2022-09-16 PROCEDURE — 1101F PT FALLS ASSESS-DOCD LE1/YR: CPT | Performed by: INTERNAL MEDICINE

## 2022-09-16 PROCEDURE — G8417 CALC BMI ABV UP PARAM F/U: HCPCS | Performed by: INTERNAL MEDICINE

## 2022-09-16 PROCEDURE — 99214 OFFICE O/P EST MOD 30 MIN: CPT | Performed by: INTERNAL MEDICINE

## 2022-09-16 PROCEDURE — G8536 NO DOC ELDER MAL SCRN: HCPCS | Performed by: INTERNAL MEDICINE

## 2022-09-16 PROCEDURE — G9717 DOC PT DX DEP/BP F/U NT REQ: HCPCS | Performed by: INTERNAL MEDICINE

## 2022-09-16 PROCEDURE — G8752 SYS BP LESS 140: HCPCS | Performed by: INTERNAL MEDICINE

## 2022-09-16 PROCEDURE — G8754 DIAS BP LESS 90: HCPCS | Performed by: INTERNAL MEDICINE

## 2022-09-16 RX ORDER — PREDNISONE 5 MG/1
5 TABLET ORAL DAILY
Qty: 90 TABLET | Refills: 0 | Status: SHIPPED | OUTPATIENT
Start: 2022-09-16

## 2022-09-16 RX ORDER — INSULIN PUMP SYRINGE, 3 ML
EACH MISCELLANEOUS
Qty: 1 KIT | Refills: 0 | Status: SHIPPED | OUTPATIENT
Start: 2022-09-16

## 2022-09-16 RX ORDER — PREDNISONE 1 MG/1
4 TABLET ORAL
Qty: 120 TABLET | Refills: 0 | Status: SHIPPED | OUTPATIENT
Start: 2022-09-16 | End: 2022-10-14

## 2022-09-16 RX ORDER — IBUPROFEN 200 MG
CAPSULE ORAL
Qty: 100 STRIP | Refills: 1 | Status: SHIPPED | OUTPATIENT
Start: 2022-09-16

## 2022-09-16 NOTE — ASSESSMENT & PLAN NOTE
Improved, not as well controlled, will increase to 9mg and taper by 1mg monthly to 5mg and then reassess. Reviewed when to see rheumatologist.  Red flags and expectations reviewed.

## 2022-09-16 NOTE — PROGRESS NOTES
Clarisse Finney is a [de-identified] y.o. male who was seen in clinic today (9/16/2022). Assessment & Plan:   Below is the assessment and plan developed based on review of pertinent history, physical exam, labs, studies, and medications. 1. Polymyalgia rheumatica (HCC)  Assessment & Plan:  Improved, not as well controlled, will increase to 9mg and taper by 1mg monthly to 5mg and then reassess. Reviewed when to see rheumatologist.  Red flags and expectations reviewed. Orders:  -     predniSONE (DELTASONE) 1 mg tablet; Take 4 Tablets by mouth daily (with breakfast). , Normal, Disp-120 Tablet, R-0  -     predniSONE (DELTASONE) 5 mg tablet; Take 1 Tablet by mouth daily. , Normal, Disp-90 Tablet, R-0  2. 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 Dx: Dx: E11.49, Normal, Disp-1 Kit, R-0  -     glucose blood VI test strips (blood glucose test) strip; Pharmacist to choose preferred meter and strips. Dx: E11.49, Normal, Disp-100 Strip, R-1  3. Coronary artery disease involving native coronary artery of native heart without angina pectoris  Assessment & Plan:  well controlled, asymptomatic. BP is well controlled, lipids are well controlled. Continue: current plan pending review of labs. 4. Essential hypertension, benign  Assessment & Plan:  at goal, continue current treatment    5. Pure hypercholesterolemia  Assessment & Plan:  at goal, continue current treatment    6. Recurrent major depressive disorder, in full remission [facility])  Assessment & Plan:  stable and well controlled, I reviewed treatment options with him, I reviewed life style changes to help improve mood, continue current treatment.       Follow-up and Dispositions    Return in about 6 months (around 3/16/2023) for FULL PHYSICAL. Subjective/Objective:   Gillian Mcguire was seen today for Diabetes      Since last visit: seen by Dr Mayda Perez in August, diagnosed w/ PMR. Endocrine Review  He is seen for diabetes with neuropathy. Testing: is performed sporadically, fasting minimum reading is 137, maximum reading is 185. On average he thinks it is 150's. He reports medication compliance: compliant all of the time. Medication side effects: none. Diabetic diet compliance: compliant all of the time. [facility]  reviewed, Gabapentin last filled on 6/13/22 - #270. He thinks it is helping somewhat. Still having some numbness and tingling in feet. Cardiovascular Review  The patient has CAD, hypertension and hyperlipidemia. He reports taking medications as instructed, no medication side effects noted, patient does not perform home BP monitoring. He generally follows a low fat low cholesterol diet, generally follows a low sodium diet. Mental Health Review  Patient is seen for depression. Available PHQ9's reviewed. He has no concerns today. Reports experiences the following side effects from the treatment: none. Rheumatology & Orthopedic Review  Pt has PMR. This was diagnosed this summer. Was on higher ose of prednisone. Was weaned down to 5mg for the last few weeks. He thinks the pain is slightly worse. Only an issue w/ activity. No problems at rest.  Shoulders, ands, hips, and knees are the issue. He is much better then when it started. He is taking his medication(s) as directed & without any side effects. Prior to Admission medications    Medication Sig Start Date End Date Taking? Authorizing Provider   gabapentin (NEURONTIN) 100 mg capsule TAKE 1 CAPSULE BY MOUTH THREE TIMES A DAY 9/12/22  Yes Reva Cornelius MD   predniSONE (DELTASONE) 10 mg tablet Take 10 mg by mouth daily.  8/22/22  Yes Reva Cornelius MD   empagliflozin (Jardiance) 10 mg tablet TAKE 1 TABLET BY MOUTH EVERY DAY 8/21/22 Yes Evelina Muniz MD   metFORMIN ER (GLUCOPHAGE XR) 500 mg tablet Take 2 Tablets by mouth two (2) times a day. Indications: type 2 diabetes mellitus 8/12/22  Yes Jessica Peña MD   metoprolol tartrate (LOPRESSOR) 25 mg tablet TAKE 1/2 TABLET BY MOUTH TWICE A DAY 8/11/22  Yes Evelina Muniz MD   simvastatin (ZOCOR) 40 mg tablet TAKE 1 TABLET BY MOUTH NIGHTLY 8/11/22  Yes Evelina Muniz MD   sertraline (ZOLOFT) 50 mg tablet TAKE 1.5 TABLETS BY MOUTH EVERY DAY 8/7/22  Yes Evelina Muniz MD   glucose blood VI test strips (True Metrix Glucose Test Strip) strip Test blood sugars daily. DX: E11.40, E11.65 8/4/22  Yes Jessica Peña MD   aspirin delayed-release 81 mg tablet Take 81 mg by mouth daily. Yes Provider, Historical   colestipoL (COLESTID) 1 gram tablet Take 3 g by mouth daily. Yes Provider, Historical   mesalamine (LIALDA) 1.2 gram delayed release tablet Take  by mouth. 4 tablets once daily   Yes Provider, Historical   cholecalciferol, vitamin D3, (VITAMIN D3 PO) Take 2,000 Units by mouth daily. Yes Provider, Historical   vitB6/mag cit,ox/potassium cit (THERALITH XR PO) Take 2 Caps by mouth two (2) times a day. Yes Provider, Historical   Blood-Glucose Meter (TRUE METRIX GLUCOSE METER) misc Use to test blood sugars daily. DX:  E11.49  Patient taking differently: Use to test blood sugars daily. DX:  E11.49 3/13/19  Yes Evelina Muniz MD   MULTIVITAMIN PO Take 1 Tab by mouth daily. Yes Provider, Historical   empagliflozin (Jardiance) 10 mg tablet   9/16/22  Provider, Historical   lancets misc Use daily as directed DX: E11.49  Patient not taking: No sig reported 3/14/19   Evelina Muniz MD        Review of Systems   Constitutional:  Negative for malaise/fatigue and weight loss. Respiratory:  Negative for cough and shortness of breath. Cardiovascular:  Negative for chest pain, palpitations and leg swelling.    Gastrointestinal:  Negative for abdominal pain, constipation, diarrhea, heartburn, nausea and vomiting. Musculoskeletal:  Positive for joint pain. Negative for myalgias. Skin:  Negative for rash. Neurological:  Positive for tingling. Negative for dizziness and headaches. Psychiatric/Behavioral:  Negative for depression. The patient is not nervous/anxious and does not have insomnia. Physical Exam  Cardiovascular:      Pulses:           Dorsalis pedis pulses are 1+ on the right side and 1+ on the left side.    Feet:      Right foot:      Skin integrity: Skin integrity normal.      Toenail Condition: Right toenails are normal.      Left foot:      Skin integrity: Skin integrity normal.      Toenail Condition: Left toenails are normal.      Comments:        Left foot Filament test: reduced sensation        Right foot Filament test: reduced sensation   Psychiatric:         Behavior: Behavior normal.     Visit Vitals  /73   Pulse 92   Temp 97.9 °F (36.6 °C) (Temporal)   Resp 18   Ht 6' (1.829 m)   Wt 194 lb (88 kg)   SpO2 97%   BMI 26.31 kg/m²       Ryan Eckert MD

## 2022-10-14 DIAGNOSIS — M35.3 POLYMYALGIA RHEUMATICA (HCC): ICD-10-CM

## 2022-10-14 RX ORDER — PREDNISONE 1 MG/1
3 TABLET ORAL
Qty: 90 TABLET | Refills: 0 | Status: SHIPPED | OUTPATIENT
Start: 2022-10-14

## 2022-11-14 DIAGNOSIS — M35.3 POLYMYALGIA RHEUMATICA (HCC): ICD-10-CM

## 2022-11-14 RX ORDER — PREDNISONE 1 MG/1
2 TABLET ORAL
Qty: 60 TABLET | Refills: 0 | Status: SHIPPED | OUTPATIENT
Start: 2022-11-14

## 2022-12-13 DIAGNOSIS — M35.3 POLYMYALGIA RHEUMATICA (HCC): ICD-10-CM

## 2022-12-14 ENCOUNTER — TELEPHONE (OUTPATIENT)
Dept: INTERNAL MEDICINE CLINIC | Age: 82
End: 2022-12-14

## 2022-12-14 NOTE — TELEPHONE ENCOUNTER
Spoke with wife, she report medication is working. Yes, can continue to lower dose.  Will forward to MD

## 2022-12-14 NOTE — TELEPHONE ENCOUNTER
Reason for call:  Pt's wife called - returning Mona's call. Wife asked that return call be after 3p.m.     Is this a new problem: no     Date of last appointment:  9/16/2022     Can we respond via [employer]: no    Best call back number: [phone]

## 2022-12-15 DIAGNOSIS — F33.42 RECURRENT MAJOR DEPRESSIVE DISORDER, IN FULL REMISSION (HCC): ICD-10-CM

## 2022-12-15 DIAGNOSIS — I25.10 CORONARY ARTERY DISEASE INVOLVING NATIVE CORONARY ARTERY OF NATIVE HEART WITHOUT ANGINA PECTORIS: ICD-10-CM

## 2022-12-15 DIAGNOSIS — E11.42 CONTROLLED TYPE 2 DIABETES MELLITUS WITH DIABETIC POLYNEUROPATHY, WITHOUT LONG-TERM CURRENT USE OF INSULIN (HCC): ICD-10-CM

## 2022-12-15 DIAGNOSIS — I10 ESSENTIAL HYPERTENSION, BENIGN: ICD-10-CM

## 2022-12-15 DIAGNOSIS — E78.00 PURE HYPERCHOLESTEROLEMIA: ICD-10-CM

## 2022-12-15 RX ORDER — METOPROLOL TARTRATE 25 MG/1
TABLET, FILM COATED ORAL
Qty: 90 TABLET | Refills: 1 | Status: SHIPPED | OUTPATIENT
Start: 2022-12-15

## 2022-12-15 RX ORDER — SERTRALINE HYDROCHLORIDE 50 MG/1
TABLET, FILM COATED ORAL
Qty: 135 TABLET | Refills: 1 | Status: SHIPPED | OUTPATIENT
Start: 2022-12-15

## 2022-12-15 RX ORDER — PREDNISONE 5 MG/1
TABLET ORAL
Qty: 90 TABLET | Refills: 0 | Status: SHIPPED | OUTPATIENT
Start: 2022-12-15

## 2022-12-15 RX ORDER — PREDNISONE 1 MG/1
1 TABLET ORAL
Qty: 30 TABLET | Refills: 0 | Status: SHIPPED | OUTPATIENT
Start: 2022-12-15 | End: 2023-01-14

## 2022-12-15 RX ORDER — SIMVASTATIN 40 MG/1
40 TABLET, FILM COATED ORAL
Qty: 90 TABLET | Refills: 1 | Status: SHIPPED | OUTPATIENT
Start: 2022-12-15

## 2022-12-16 NOTE — TELEPHONE ENCOUNTER
Future Appointments   Date Time Provider Starla Hedrick   3/21/2023 10:20 AM Ariel Clay MD [facility] JEVON FOX AMB

## 2023-03-16 DIAGNOSIS — E11.49 CONTROLLED TYPE 2 DIABETES MELLITUS WITH OTHER NEUROLOGIC COMPLICATION, WITHOUT LONG-TERM CURRENT USE OF INSULIN (HCC): Primary | ICD-10-CM

## 2023-03-17 DIAGNOSIS — E11.42 CONTROLLED TYPE 2 DIABETES MELLITUS WITH DIABETIC POLYNEUROPATHY, WITHOUT LONG-TERM CURRENT USE OF INSULIN (HCC): ICD-10-CM

## 2023-03-17 RX ORDER — GABAPENTIN 100 MG/1
100 CAPSULE ORAL 3 TIMES DAILY
Qty: 270 CAPSULE | Refills: 1 | OUTPATIENT
Start: 2023-03-17

## 2023-03-17 RX ORDER — GABAPENTIN 100 MG/1
CAPSULE ORAL
Qty: 270 CAPSULE | Refills: 1 | Status: SHIPPED | OUTPATIENT
Start: 2023-03-17

## 2023-03-17 NOTE — TELEPHONE ENCOUNTER
Requested Prescriptions     Pending Prescriptions Disp Refills    gabapentin (NEURONTIN) 100 mg capsule 270 Capsule 1     Sig: Take 1 Capsule by mouth three (3) times daily.

## 2023-03-17 NOTE — PROGRESS NOTES
Future Appointments   Date Time Provider Starla Hedrick   3/21/2023 10:20 AM Ciara Paulino MD [facility] JEVON FOX AMB

## 2023-03-21 ENCOUNTER — OFFICE VISIT (OUTPATIENT)
Dept: INTERNAL MEDICINE CLINIC | Age: 83
End: 2023-03-21
Payer: MEDICARE

## 2023-03-21 VITALS
HEART RATE: 82 BPM | BODY MASS INDEX: 26.82 KG/M2 | TEMPERATURE: 98.3 F | DIASTOLIC BLOOD PRESSURE: 72 MMHG | WEIGHT: 198 LBS | SYSTOLIC BLOOD PRESSURE: 110 MMHG | HEIGHT: 72 IN | RESPIRATION RATE: 18 BRPM | OXYGEN SATURATION: 97 %

## 2023-03-21 DIAGNOSIS — M35.3 POLYMYALGIA RHEUMATICA (HCC): ICD-10-CM

## 2023-03-21 DIAGNOSIS — R60.0 BILATERAL LEG EDEMA: ICD-10-CM

## 2023-03-21 DIAGNOSIS — F33.42 RECURRENT MAJOR DEPRESSIVE DISORDER, IN FULL REMISSION (HCC): ICD-10-CM

## 2023-03-21 DIAGNOSIS — Z00.00 MEDICARE ANNUAL WELLNESS VISIT, SUBSEQUENT: Primary | ICD-10-CM

## 2023-03-21 DIAGNOSIS — I10 ESSENTIAL HYPERTENSION, BENIGN: ICD-10-CM

## 2023-03-21 DIAGNOSIS — I25.10 CORONARY ARTERY DISEASE INVOLVING NATIVE CORONARY ARTERY OF NATIVE HEART WITHOUT ANGINA PECTORIS: ICD-10-CM

## 2023-03-21 DIAGNOSIS — E78.00 PURE HYPERCHOLESTEROLEMIA: ICD-10-CM

## 2023-03-21 DIAGNOSIS — Z71.89 ADVANCED CARE PLANNING/COUNSELING DISCUSSION: ICD-10-CM

## 2023-03-21 DIAGNOSIS — K51.911 ULCERATIVE COLITIS WITH RECTAL BLEEDING, UNSPECIFIED LOCATION (HCC): ICD-10-CM

## 2023-03-21 DIAGNOSIS — E11.49 CONTROLLED TYPE 2 DIABETES MELLITUS WITH OTHER NEUROLOGIC COMPLICATION, WITHOUT LONG-TERM CURRENT USE OF INSULIN (HCC): ICD-10-CM

## 2023-03-21 DIAGNOSIS — C02.9 SQUAMOUS CELL CARCINOMA OF TONGUE (HCC): ICD-10-CM

## 2023-03-21 PROCEDURE — G8536 NO DOC ELDER MAL SCRN: HCPCS | Performed by: INTERNAL MEDICINE

## 2023-03-21 PROCEDURE — 99214 OFFICE O/P EST MOD 30 MIN: CPT | Performed by: INTERNAL MEDICINE

## 2023-03-21 PROCEDURE — G8427 DOCREV CUR MEDS BY ELIG CLIN: HCPCS | Performed by: INTERNAL MEDICINE

## 2023-03-21 PROCEDURE — G0439 PPPS, SUBSEQ VISIT: HCPCS | Performed by: INTERNAL MEDICINE

## 2023-03-21 PROCEDURE — G8417 CALC BMI ABV UP PARAM F/U: HCPCS | Performed by: INTERNAL MEDICINE

## 2023-03-21 PROCEDURE — G0463 HOSPITAL OUTPT CLINIC VISIT: HCPCS | Performed by: INTERNAL MEDICINE

## 2023-03-21 PROCEDURE — 1101F PT FALLS ASSESS-DOCD LE1/YR: CPT | Performed by: INTERNAL MEDICINE

## 2023-03-21 PROCEDURE — G9717 DOC PT DX DEP/BP F/U NT REQ: HCPCS | Performed by: INTERNAL MEDICINE

## 2023-03-21 RX ORDER — PREDNISONE 5 MG/1
5 TABLET ORAL DAILY
Qty: 30 TABLET | Refills: 0 | Status: SHIPPED | OUTPATIENT
Start: 2023-03-21 | End: 2023-04-20

## 2023-03-21 RX ORDER — PREDNISONE 1 MG/1
4 TABLET ORAL
Qty: 120 TABLET | Refills: 0 | Status: SHIPPED | OUTPATIENT
Start: 2023-04-20

## 2023-03-21 NOTE — ASSESSMENT & PLAN NOTE
Slightly worse off medication, will restart prednisone 5mg x 1 month and then go down to 4mg x 1 month and reassess after that

## 2023-03-21 NOTE — PATIENT INSTRUCTIONS
Medicare Wellness Visit, Male    The best way to live healthy is to have a lifestyle where you eat a well-balanced diet, exercise regularly, limit alcohol use, and quit all forms of tobacco/nicotine, if applicable. Regular preventive services are another way to keep healthy. Preventive services (vaccines, screening tests, monitoring & exams) can help personalize your care plan, which helps you manage your own care. Screening tests can find health problems at the earliest stages, when they are easiest to treat. [facility] follows the current, evidence-based guidelines published by the [facility] Rona ([facility]) when recommending preventive services for our patients. Because we follow these guidelines, sometimes recommendations change over time as research supports it. (For example, a prostate screening blood test is no longer routinely recommended for men with no symptoms). Of course, you and your doctor may decide to screen more often for some diseases, based on your risk and co-morbidities (chronic disease you are already diagnosed with). Preventive services for you include:  - Medicare offers their members a free annual wellness visit, which is time for you and your primary care provider to discuss and plan for your preventive service needs.  Take advantage of this benefit every year!    -Over the age of 72 should receive the recommended pneumonia vaccines.   -All adults should have a flu vaccine yearly.  -All adults should receive a tetanus vaccine every 10 years.  -Over the age of 48 should receive the shingles vaccines.    -All adults should be screened once for Hepatitis C.  -All adults age 38-68 who are overweight should have a diabetes screening test once every three years.   -Other screening tests & preventive services for persons with diabetes include: an eye exam to screen for diabetic retinopathy, a kidney function test, a foot exam, and stricter control over your cholesterol.   -Cardiovascular screening for adults with routine risk involves an electrocardiogram (ECG) at intervals determined by the provider.     -Colorectal cancer screening should be done for adults age 43-69 with no increased risk factors for colorectal cancer. There are a number of acceptable methods of screening for this type of cancer. Each test has its own benefits and drawbacks. Discuss with your provider what is most appropriate for you during your annual wellness visit. The different tests include: colonoscopy (considered the best screening method), a fecal occult blood test, a fecal DNA test, and sigmoidoscopy.    -Lung cancer screening is recommended annually with a low dose CT scan for adults between age 54 and 68, who have smoked at least 30 pack years (equivalent of 1 pack per day for 30 days), and who is a current smoker or quit less than 15 years ago. -An Abdominal Aortic Aneurysm (AAA) Screening is recommended for men age 73-68 who has ever smoked in their lifetime. Here is a list of your current Health Maintenance items (your personalized list of preventive services) with a due date: There are no preventive care reminders to display for this patient.

## 2023-03-21 NOTE — PROGRESS NOTES
Randi Buitrago is a 81 y.o. male who was seen in clinic today (3/21/2023) for a full physical.          Assessment & Plan:   Below is the assessment and plan developed based on review of pertinent history, physical exam, labs, studies, and medications. 1. Medicare annual wellness visit, subsequent  2. Advanced care planning/counseling discussion  3. Polymyalgia rheumatica (HCC)  Assessment & Plan:  Slightly worse off medication, will restart prednisone 5mg x 1 month and then go down to 4mg x 1 month and reassess after that   Orders:  -     predniSONE (DELTASONE) 5 mg tablet; Take 1 Tablet by mouth daily for 30 days. , Normal, Disp-30 Tablet, R-0  -     predniSONE (DELTASONE) 1 mg tablet; Take 4 Tablets by mouth daily (with breakfast). , Normal, Disp-120 Tablet, R-0  4. 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 [facility], no changes with Gab[facility], reviewed expectations  5. Coronary artery disease involving native coronary artery of native heart without angina pectoris  Assessment & Plan:  stable, asymptomatic. BP is well controlled, lipids are well controlled. Continue: current plan. 6. Essential hypertension, benign  Assessment & Plan:  well controlled, continue current treatment    7. Pure hypercholesterolemia  Assessment & Plan:  at goal, continue current treatment    8. Recurrent major depressive disorder, in full remission ([facility]  Assessment & Plan:  well controlled, continue current treatment    9. Squamous cell carcinoma of tongue (HCC)  Assessment & Plan:  Asymptomatic, dx in '20, monitored by specialist. No acute findings meriting change in the plan  10.  Ulcerative colitis with rectal bleeding, unspecified location [facility])  Assessment & Plan:  Stable, intermittent symptoms, monitored by specialist. No acute findings meriting change in the plan  11. Bilateral leg edema  Comments:  new dx, differential reviewed, unclear. He thinks related to stopping prednisone, will restart & reassess, labs normal, so will need TTE if no improvement       Follow-up and Dispositions    Return in about 6 months (around 9/21/2023) for Regular follow up. Subjective:   Ari Gandhi is here today for a full physical.      Annual Wellness Visit- Subsequent Visit    Since last visit: no changes    The following acute and/or chronic problems were addressed today:    Endocrine Review  He is seen for diabetes with neuropathy. Testing: is performed sporadically, fasting minimum reading is 116, maximum reading is 126. On average he thinks it is ~118. He reports medication compliance: compliant all of the time. Medication side effects: none. Diabetic diet compliance: compliant all of the time. [facility]  reviewed, Gabapentin last filled on 3/20/23 - #270. He thinks it is helping somewhat. Still having some numbness and tingling in feet. Cardiovascular Review  The patient has CAD, hypertension and hyperlipidemia. He reports taking medications as instructed, no medication side effects noted, patient does not perform home BP monitoring. He generally follows a low fat low cholesterol diet, generally follows a low sodium diet. Mental Health Review  Patient is seen for depression. Available PHQ9's reviewed. He has no concerns today. Reports experiences the following side effects from the treatment: none. Rheumatology & Orthopedic Review  Pt has PMR. Diagnosed in August '22. He stopped stopped prednisone 5mg about 3 months ago. He reports pain in shoulder is back and is slightly worse. He has been having pains in all his joints to some degree. No problems at rest.  Shoulders, ands, hips, and knees are the issue. He is much better then when it started.   He is taking his medication(s) as directed & without any side effects. End of Life Planning: This was discussed with him today and he has an advanced directive - a copy has been provided. Reviewed DNR/DNI and patient is not interested. Depression Screen:  3 most recent PHQ Screens 3/21/2023   PHQ Not Done -   Little interest or pleasure in doing things Not at all   Feeling down, depressed, irritable, or hopeless Not at all   Total Score PHQ 2 0   Trouble falling or staying asleep, or sleeping too much -   Feeling tired or having little energy -   Poor appetite, weight loss, or overeating -   Feeling bad about yourself - or that you are a failure or have let yourself or your family down -   Trouble concentrating on things such as school, work, reading, or watching TV -   Moving or speaking so slowly that other people could have noticed; or the opposite being so fidgety that others notice -   Thoughts of being better off dead, or hurting yourself in some way -   PHQ 9 Score -   How difficult have these problems made it for you to do your work, take care of your home and get along with others -         Fall Risk:   Fall Risk Assessment, last 12 mths 3/21/2023   Able to walk? Yes   Fall in past 12 months? 0   Do you feel unsteady? 0   Are you worried about falling 0   Is TUG test greater than 12 seconds? -   Is the gait abnormal? -   Number of falls in past 12 months -   Fall with injury? -       Abuse Screen:  Abuse Screening Questionnaire 3/21/2023   Do you ever feel afraid of your partner? N   Are you in a relationship with someone who physically or mentally threatens you? N   Is it safe for you to go home?  Y       Do you average more than 1 drink per night or more than 7 drinks a week: No    In the past three months have you have had more than 4 drinks containing alcohol on one occasion: No          Health Maintenance:   Daily Aspirin: no  AAA Screening: DT scan on 10/21/15    Immunizations:   Covid: up to date   Influenza: up to date   Tetanus: up to date   Shingles: up to date   Pneumonia: up to date  Cancer screening:   Prostate: guidelines reviewed, n/a  Colon: guidelines reviewed, up to date    Functional Ability and Level of Safety:    Hearing: Hearing is good. The patient wears hearing aids. Cognition Screen:   Has your family/caregiver stated any concerns about your memory: no - but does notice it is not as good. Cognitive Screening: Normal - Mini Cog Test  - 2/2 clock, 2/3 word recall     Ambulation: with mild difficulty     Activities of Daily Living: The home contains: grab bars  Patient does total self care  Exercise: moderately active    Adult Nutrition Screen:  No risk factors noted. Patient Care Team:  Luis Leach MD as PCP - General (Internal Medicine Physician)  Lius Leach MD as PCP - [address] [place] [address] Provider  Baron Bren MD (Ophthalmology)  Corinne Meng MD as Physician (Urology)  Ramandeep Pinto DPM ([place])  Don Pittman MD (Pulmonary Disease)  Catalina Mccarthy MD (Gastroenterology)  Maira Young MD (Gastroenterology)  Corinne Gonzáles MD (Otolaryngology)       The following sections were reviewed & updated as appropriate: Problem List, Allergies, PMH, PSH, FH, and SH. Prior to Admission medications    Medication Sig Start Date End Date Taking?  Authorizing Provider   gabapentin (NEURONTIN) 100 mg capsule TAKE 1 CAPSULE BY MOUTH THREE TIMES A DAY 3/17/23  Yes Luis Leach MD   empagliflozin (Jardiance) 10 mg tablet TAKE 1 TABLET BY MOUTH EVERY DAY 2/27/23  Yes Luis Leach MD   metoprolol tartrate (LOPRESSOR) 25 mg tablet TAKE 1/2 TABLET BY MOUTH TWICE A DAY 12/15/22  Yes Luis Leach MD   sertraline (ZOLOFT) 50 mg tablet TAKE 1 AND 1/2 TABLETS BY MOUTH EVERY DAY 12/15/22  Yes Luis Leach MD   simvastatin (ZOCOR) 40 mg tablet TAKE 1 TABLET BY MOUTH NIGHTLY 12/15/22  Yes Luis Leach MD   Blood-Glucose Meter monitoring kit Pharmacist to choose preferred meter and strips. Dx: Dx: E11.49 9/16/22  Yes Joie Reed MD   glucose blood VI test strips (blood glucose test) strip Pharmacist to choose preferred meter and strips. Dx: E11.49 9/16/22  Yes Joie Reed MD   metFORMIN ER (GLUCOPHAGE XR) 500 mg tablet Take 2 Tablets by mouth two (2) times a day. Indications: type 2 diabetes mellitus 8/12/22  Yes Uriel Dorado MD   aspirin delayed-release 81 mg tablet Take 81 mg by mouth daily. Yes Provider, Historical   colestipoL (COLESTID) 1 gram tablet Take 3 g by mouth daily. Yes Provider, Historical   mesalamine (LIALDA) 1.2 gram delayed release tablet Take  by mouth. 4 tablets once daily   Yes Provider, Historical   cholecalciferol, vitamin D3, (VITAMIN D3 PO) Take 2,000 Units by mouth daily. Yes Provider, Historical   vitB6/mag cit,ox/potassium cit (THERALITH XR PO) Take 2 Caps by mouth two (2) times a day. Yes Provider, Historical   lancets misc Use daily as directed DX: E11.49 3/14/19  Yes Joie Reed MD   MULTIVITAMIN PO Take 1 Tab by mouth daily. Yes Provider, Historical   predniSONE (DELTASONE) 5 mg tablet TAKE 1 TABLET BY MOUTH EVERY DAY  Patient not taking: Reported on 3/21/2023 12/15/22   Joie Reed MD          Review of Systems   Constitutional:  Negative for chills and fever. Respiratory:  Negative for cough and shortness of breath. Cardiovascular:  Positive for leg swelling. Negative for chest pain and palpitations. Gastrointestinal:  Positive for diarrhea. Negative for abdominal pain, blood in stool, constipation, heartburn, nausea and vomiting. Genitourinary:         He denies: nocturia, urinary hesitancy, urinary frequency, weak stream.   Musculoskeletal:  Positive for joint pain (knees & hands, on/off). Negative for myalgias. Neurological:  Positive for tingling and sensory change. Negative for focal weakness and headaches.    Endo/Heme/Allergies:  Does not bruise/bleed easily. Psychiatric/Behavioral:  Negative for depression. The patient is not nervous/anxious and does not have insomnia. Objective:   Physical Exam  Constitutional:       General: He is not in acute distress. Eyes:      Conjunctiva/sclera: Conjunctivae normal.   Cardiovascular:      Rate and Rhythm: Regular rhythm. Pulses:           Dorsalis pedis pulses are 1+ on the right side and 1+ on the left side. Heart sounds: No murmur heard. Pulmonary:      Effort: Pulmonary effort is normal.      Breath sounds: Normal breath sounds. No decreased breath sounds or wheezing. Abdominal:      General: Bowel sounds are normal.      Palpations: Abdomen is soft. There is no hepatomegaly or splenomegaly. Tenderness: There is no abdominal tenderness. Musculoskeletal:      Right lower leg: Edema (1+ in ankle) present. Left lower leg: Edema (1+ in ankle) present.    Feet:      Right foot:      Skin integrity: Skin integrity normal.      Toenail Condition: Right toenails are normal.      Left foot:      Skin integrity: Skin integrity normal.      Toenail Condition: Left toenails are normal.      Comments:        Left foot Filament test: normal sensation        Right foot Filament test: normal sensation   Psychiatric:         Mood and Affect: Mood and affect normal.         Behavior: Behavior normal.        Visit Vitals  /72   Pulse 82   Temp 98.3 °F (36.8 °C) (Temporal)   Resp 18   Ht 6' (1.829 m)   Wt 198 lb (89.8 kg)   SpO2 97%   BMI 26.85 kg/m²         Leonila Davila MD

## 2023-03-21 NOTE — ASSESSMENT & PLAN NOTE
Well controlled, improved - likely due to being of steroids, will check labs, will be restarting prednisone so reviewed expectations.   [facility], no changes with Gabapentin, reviewed expectations

## 2023-03-21 NOTE — ACP (ADVANCE CARE PLANNING)
Advance Care Planning   Advance Care Planning (ACP) Physician/NP/PA (Provider) Conversation    Date of ACP Conversation: 03/21/23  Persons included in Conversation:  patient  Length of ACP Conversation in minutes: <16 minutes (Non-Billable)    Authorized Decision Maker (if patient is incapable of making informed decisions):   Named in Advance Directive or Healthcare Power of       Primary Decision Maker: Familia Nini - [phone]    Secondary Decision Maker: aMrie Erickson    He has an advanced directive - a copy has been provided & still reflects him wishes. Reviewed DNR/DNI and patient is not interested- elects Full Code (attempt resuscitation).        Suresh Covington MD

## 2023-03-21 NOTE — ASSESSMENT & PLAN NOTE
Stable, intermittent symptoms, monitored by specialist. No acute findings meriting change in the plan

## 2023-05-26 RX ORDER — LANCETS 30 GAUGE
EACH MISCELLANEOUS
COMMUNITY
Start: 2019-03-14

## 2023-05-26 RX ORDER — GABAPENTIN 100 MG/1
1 CAPSULE ORAL 3 TIMES DAILY
COMMUNITY
Start: 2023-03-17

## 2023-05-26 RX ORDER — ASPIRIN 81 MG/1
81 TABLET ORAL DAILY
COMMUNITY

## 2023-05-26 RX ORDER — MESALAMINE 1.2 G/1
TABLET, DELAYED RELEASE ORAL
COMMUNITY

## 2023-05-26 RX ORDER — PREDNISONE 5 MG/1
1 TABLET ORAL DAILY
COMMUNITY
Start: 2022-12-15

## 2023-05-26 RX ORDER — PREDNISONE 1 MG/1
4 TABLET ORAL
COMMUNITY
Start: 2023-04-20

## 2023-05-26 RX ORDER — SIMVASTATIN 40 MG
1 TABLET ORAL NIGHTLY
COMMUNITY
Start: 2022-12-15 | End: 2023-06-27

## 2023-05-26 RX ORDER — METFORMIN HYDROCHLORIDE 500 MG/1
1000 TABLET, EXTENDED RELEASE ORAL 2 TIMES DAILY
COMMUNITY
Start: 2022-08-12 | End: 2023-06-20

## 2023-05-26 RX ORDER — MONTELUKAST SODIUM 4 MG/1
3 TABLET, CHEWABLE ORAL DAILY
COMMUNITY

## 2023-06-18 ENCOUNTER — PATIENT MESSAGE (OUTPATIENT)
Age: 83
End: 2023-06-18

## 2023-06-19 DIAGNOSIS — E11.49 TYPE 2 DIABETES MELLITUS WITH OTHER DIABETIC NEUROLOGICAL COMPLICATION (HCC): ICD-10-CM

## 2023-06-19 NOTE — TELEPHONE ENCOUNTER
From: Renata Lee  To: Dr. 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 They are: Metformin HCL 500mg, Jacqueline[emtom 100 mg, Jardiance 10 mg. The pharmacy has been contacted. He is completely out of all 3.  Anything you can do to expedite the refills is gratefully appreciated    Thanks,  Nakita Ochoa

## 2023-06-20 RX ORDER — METFORMIN HYDROCHLORIDE 500 MG/1
TABLET, EXTENDED RELEASE ORAL
Qty: 360 TABLET | Refills: 1 | Status: SHIPPED | OUTPATIENT
Start: 2023-06-20

## 2023-06-20 RX ORDER — BLOOD SUGAR DIAGNOSTIC
STRIP MISCELLANEOUS
Qty: 100 STRIP | Refills: 1 | Status: SHIPPED | OUTPATIENT
Start: 2023-06-20

## 2023-06-20 NOTE — TELEPHONE ENCOUNTER
Future Appointments   Date Time Provider Carolyn Huff   9/26/2023 10:20 AM Marta Sampson MD [facility]

## 2023-06-27 DIAGNOSIS — I10 ESSENTIAL (PRIMARY) HYPERTENSION: ICD-10-CM

## 2023-06-27 DIAGNOSIS — I25.10 ATHEROSCLEROTIC HEART DISEASE OF NATIVE CORONARY ARTERY WITHOUT ANGINA PECTORIS: ICD-10-CM

## 2023-06-27 DIAGNOSIS — E11.42 TYPE 2 DIABETES MELLITUS WITH DIABETIC POLYNEUROPATHY (HCC): ICD-10-CM

## 2023-06-27 RX ORDER — SIMVASTATIN 40 MG
TABLET ORAL NIGHTLY
Qty: 90 TABLET | Refills: 1 | Status: SHIPPED | OUTPATIENT
Start: 2023-06-27

## 2023-09-09 DIAGNOSIS — E11.42 TYPE 2 DIABETES MELLITUS WITH DIABETIC POLYNEUROPATHY (HCC): ICD-10-CM

## 2023-09-10 RX ORDER — GABAPENTIN 100 MG/1
100 CAPSULE ORAL 3 TIMES DAILY
Qty: 270 CAPSULE | Refills: 0 | Status: SHIPPED | OUTPATIENT
Start: 2023-09-10 | End: 2023-12-09

## 2023-09-25 ENCOUNTER — HOSPITAL ENCOUNTER (OUTPATIENT)
Facility: HOSPITAL | Age: 83
Setting detail: OUTPATIENT SURGERY
Discharge: HOME | End: 2023-09-25
Attending: INTERNAL MEDICINE | Admitting: INTERNAL MEDICINE
Payer: MEDICARE

## 2023-09-25 ENCOUNTER — ANESTHESIA (OUTPATIENT)
Facility: HOSPITAL | Age: 83
End: 2023-09-25
Payer: MEDICARE

## 2023-09-25 ENCOUNTER — ANESTHESIA EVENT (OUTPATIENT)
Facility: HOSPITAL | Age: 83
End: 2023-09-25
Payer: MEDICARE

## 2023-09-25 VITALS
RESPIRATION RATE: 14 BRPM | WEIGHT: 181 LBS | SYSTOLIC BLOOD PRESSURE: 158 MMHG | BODY MASS INDEX: 25.34 KG/M2 | DIASTOLIC BLOOD PRESSURE: 65 MMHG | HEART RATE: 53 BPM | HEIGHT: 71 IN | OXYGEN SATURATION: 100 % | TEMPERATURE: 98.7 F

## 2023-09-25 LAB
EKG ATRIAL RATE: 53 BPM
EKG DIAGNOSIS: NORMAL
EKG P AXIS: 61 DEGREES
EKG P-R INTERVAL: 164 MS
EKG Q-T INTERVAL: 480 MS
EKG QRS DURATION: 90 MS
EKG QTC CALCULATION (BAZETT): 450 MS
EKG R AXIS: -4 DEGREES
EKG T AXIS: 6 DEGREES
EKG VENTRICULAR RATE: 53 BPM

## 2023-09-25 PROCEDURE — 7100000010 HC PHASE II RECOVERY - FIRST 15 MIN: Performed by: INTERNAL MEDICINE

## 2023-09-25 PROCEDURE — 3700000001 HC ADD 15 MINUTES (ANESTHESIA): Performed by: INTERNAL MEDICINE

## 2023-09-25 PROCEDURE — 2580000003 HC RX 258: Performed by: NURSE ANESTHETIST, CERTIFIED REGISTERED

## 2023-09-25 PROCEDURE — 88305 TISSUE EXAM BY PATHOLOGIST: CPT

## 2023-09-25 PROCEDURE — 3700000000 HC ANESTHESIA ATTENDED CARE: Performed by: INTERNAL MEDICINE

## 2023-09-25 PROCEDURE — 6360000002 HC RX W HCPCS: Performed by: NURSE ANESTHETIST, CERTIFIED REGISTERED

## 2023-09-25 PROCEDURE — 3600007502: Performed by: INTERNAL MEDICINE

## 2023-09-25 PROCEDURE — 7100000011 HC PHASE II RECOVERY - ADDTL 15 MIN: Performed by: INTERNAL MEDICINE

## 2023-09-25 PROCEDURE — 93005 ELECTROCARDIOGRAM TRACING: CPT | Performed by: INTERNAL MEDICINE

## 2023-09-25 PROCEDURE — 3600007512: Performed by: INTERNAL MEDICINE

## 2023-09-25 PROCEDURE — 6370000000 HC RX 637 (ALT 250 FOR IP): Performed by: INTERNAL MEDICINE

## 2023-09-25 RX ORDER — SODIUM CHLORIDE 9 MG/ML
25 INJECTION, SOLUTION INTRAVENOUS PRN
Status: CANCELLED | OUTPATIENT
Start: 2023-09-25

## 2023-09-25 RX ORDER — SODIUM CHLORIDE 0.9 % (FLUSH) 0.9 %
5-40 SYRINGE (ML) INJECTION EVERY 12 HOURS SCHEDULED
Status: CANCELLED | OUTPATIENT
Start: 2023-09-25

## 2023-09-25 RX ORDER — SODIUM CHLORIDE 9 MG/ML
INJECTION, SOLUTION INTRAVENOUS CONTINUOUS
Status: CANCELLED | OUTPATIENT
Start: 2023-09-25

## 2023-09-25 RX ORDER — SODIUM CHLORIDE 9 MG/ML
INJECTION, SOLUTION INTRAVENOUS CONTINUOUS PRN
Status: DISCONTINUED | OUTPATIENT
Start: 2023-09-25 | End: 2023-09-25 | Stop reason: SDUPTHER

## 2023-09-25 RX ORDER — SODIUM CHLORIDE 0.9 % (FLUSH) 0.9 %
5-40 SYRINGE (ML) INJECTION PRN
Status: CANCELLED | OUTPATIENT
Start: 2023-09-25

## 2023-09-25 RX ORDER — CHOLECALCIFEROL (VITAMIN D3) 1250 MCG
CAPSULE ORAL
COMMUNITY

## 2023-09-25 RX ORDER — SODIUM CHLORIDE, SODIUM LACTATE, POTASSIUM CHLORIDE, CALCIUM CHLORIDE 600; 310; 30; 20 MG/100ML; MG/100ML; MG/100ML; MG/100ML
INJECTION, SOLUTION INTRAVENOUS CONTINUOUS PRN
Status: DISCONTINUED | OUTPATIENT
Start: 2023-09-25 | End: 2023-09-25

## 2023-09-25 RX ORDER — PROPOFOL 10 MG/ML
INJECTION, EMULSION INTRAVENOUS
Status: COMPLETED
Start: 2023-09-25 | End: 2023-09-25

## 2023-09-25 RX ORDER — CRANBERRY FRUIT EXTRACT 650 MG
CAPSULE ORAL
COMMUNITY

## 2023-09-25 RX ORDER — SIMETHICONE 20 MG/.3ML
EMULSION ORAL PRN
Status: DISCONTINUED | OUTPATIENT
Start: 2023-09-25 | End: 2023-09-25 | Stop reason: ALTCHOICE

## 2023-09-25 RX ADMIN — PROPOFOL 50 MG: 10 INJECTION, EMULSION INTRAVENOUS at 08:58

## 2023-09-25 RX ADMIN — PROPOFOL 50 MG: 10 INJECTION, EMULSION INTRAVENOUS at 08:52

## 2023-09-25 RX ADMIN — SODIUM CHLORIDE: 9 INJECTION, SOLUTION INTRAVENOUS at 08:47

## 2023-09-25 RX ADMIN — PROPOFOL 100 MG: 10 INJECTION, EMULSION INTRAVENOUS at 08:50

## 2023-09-25 RX ADMIN — PROPOFOL 50 MG: 10 INJECTION, EMULSION INTRAVENOUS at 08:54

## 2023-09-25 RX ADMIN — PROPOFOL 50 MG: 10 INJECTION, EMULSION INTRAVENOUS at 09:04

## 2023-09-25 NOTE — ANESTHESIA PRE PROCEDURE
Neuro/Psych:   (+) depression/anxiety             GI/Hepatic/Renal:   (+) PUD,          ROS comment: UC.   Endo/Other:    (+) DiabetesType II DM, , .                 Abdominal: normal exam            Vascular: negative vascular ROS. Other Findings:           Anesthesia Plan      MAC     ASA 2       Induction: intravenous. MIPS: Prophylactic antiemetics administered. Anesthetic plan and risks discussed with patient. Use of blood products discussed with patient whom consented to blood products. Plan discussed with CRNA.     Attending anesthesiologist reviewed and agrees with Preprocedure content                Dorys Bolivar DO   9/25/2023

## 2023-09-25 NOTE — H&P
[address]  [phone]                                History and Physical     NAME: Susi Fisher   :  1940   MRN:  [id number]     HPI:  The patient was seen and examined. Past Surgical History:   Procedure Laterality Date    CARDIAC CATHETERIZATION      CATARACT REMOVAL  ,     both eyes, laser repair to left eye in     CHEST SURGERY      double heart bypass    COLONOSCOPY N/A 10/28/2021    COLONOSCOPY (URGENT)   :- performed by Va Cortes MD at [facility] ENDOSCOPY    COLONOSCOPY  2014    Dr. Mack Joyner recommended q2 surveil    COLONOSCOPY  06/15/2016    path only- crypt destructive colitis/multiple colonoscopies    COLONOSCOPY N/A 2017    active colitis on biopsy.   Performed by Tracey Beard MD at [facility] ENDOSCOPY    COLONOSCOPY Left 2018    active colitis - performed by Tracey Beard MD at [facility] ENDOSCOPY    COLONOSCOPY N/A 05/15/2020    colitis, no polyps - performed by Va Cortes MD at [address] [facility]      2 vessel    HEENT Right 2020    partial glossectomy - Invasive squamous cell carcinoma, moderately differentiated, keratinizing type    HEENT Right 2021    repeat partial glossectomy    HERNIA REPAIR      Abdominal Hernia    LITHOTRIPSY      OTHER SURGICAL HISTORY  2015    advance cystoscopy    TONSILLECTOMY      TURP      UROLOGICAL SURGERY      TUNA procedure    UROLOGICAL SURGERY  2012    cystoscopy to remove kidney stone x3     Past Medical History:   Diagnosis Date    Arthritis     Autoimmune disease ([address])     BPH (benign prostatic hyperplasia)     improved after TURP    CAD (coronary artery disease)     MI, PCI/stent to mid CX 02, CABG x2 on 02 (LIMA to LAD & SVG to CX)    Calculus of kidney     due to pelvic kidney    Cancer ([address]) 2020    TONGUE    Chronic back pain     scoliosis & OA    Chronic

## 2023-09-25 NOTE — PROGRESS NOTES
Endoscopy recovery  Patient returned to baseline, vital signs stable (see vital sign flowsheet). Patient offered liquids and tolerated well. Respiratory status within defined limits. Abdomen soft not tender. Skin with in defined limits. Responsible party driving patient home was given the opportunity to ask questions. Patient discharged with documented belongings. EKG ordered for burst of tachycardia rhythm  Per Dr. Mk Castañeda.  Pt denies chest pain, shortness of breath dizziness  Ekg normal and pt ok to d/c home

## 2023-09-25 NOTE — ANESTHESIA POSTPROCEDURE EVALUATION
Department of Anesthesiology  Postprocedure Note    Patient: Tino Heimlich  MRN: [id number]  YOB: 1940  Date of evaluation: 9/25/2023      Procedure Summary     Date: 09/25/23 Room / Location: [place] [address] / [facility] ENDOSCOPY    Anesthesia Start: 0725 Anesthesia Stop: 7690    Procedure: COLONOSCOPY Diagnosis:       Ulcerative colitis with complication, unspecified location ([address])      (Ulcerative colitis with complication, unspecified location [facility]) [K51.919])    Surgeons: Melissa Hutchinson MD Responsible Provider: Ellen Fonseca DO    Anesthesia Type: MAC ASA Status: 2          Anesthesia Type: MAC    Eliana Phase I:      Eliana Phase II: Eliana Score: 9      Anesthesia Post Evaluation    Patient location during evaluation: bedside  Patient participation: complete - patient participated  Level of consciousness: awake and alert  Pain score: 0  Airway patency: patent  Nausea & Vomiting: no nausea and no vomiting  Complications: no  Cardiovascular status: blood pressure returned to baseline  Respiratory status: room air  Hydration status: euvolemic  Pain management: adequate

## 2023-09-25 NOTE — PROGRESS NOTES
Initial RN admission and assessment performed and documented in Endoscopy navigator. Patient evaluated by anesthesia in pre-procedure holding. All procedural vital signs, airway assessment, and level of consciousness information monitored and recorded by anesthesia staff on the anesthesia record. Report received from CRNA post procedure. Patient transported to recovery area by RN. Specimens labeled and reviewed with physician. Endoscope was pre-cleaned at bedside immediately following procedure by Alejandro Grissom.

## 2023-09-25 NOTE — DISCHARGE INSTRUCTIONS
[address] Cecelia Jarquin  [id number]  1940    It was my pleasure seeing you for your procedure. You will also receive a summary report with the findings from this procedure and any further recommendations. If you had polyps removed or biopsies taken during your procedure, you will receive a separate letter from me within approximately the next 2 weeks. If you don't receive this letter or if you have any questions, please call my office [phone]. Please take note of the post procedure instructions listed below. Dr. Dayday Arroyo    These instructions give you information on caring for yourself after your procedure. Call your doctor if you have any problems or questions after your procedure. HOME CARE  Walk if you have belly cramping or gas. Walking will help get rid of the air and reduce the bloated feeling in your belly (abdomen). Your IV site (where you received drugs) may be tender to touch. Place warm towels on the site; keep your arm up on two pillows if you have any swelling or soreness in the area. You may shower. ACTIVITY:  Take frequent rest periods and move at a slower pace for the next 24 hours. .  You may resume your regular activity tomorrow if you are feeling back to normal.  Do not drive or ride a bicycle for at least 24 hours (because of the medicine (anesthesia) used during the test). Do not sign any important legal documents or use or operate any machinery for 24 hours  Do not take sleeping medicines/nerve drugs for 24 hours unless the doctor tells you. You can return to work/school tomorrow unless otherwise instructed. NUTRITION:  Drink plenty of fluids to keep your pee (urine) clear or pale yellow  Begin with a light meal and progress to your normal diet.  Heavy or fried foods are harder to digest and may make you feel sick to your stomach (nauseated). Once you are feeling back to normal, you may resume your normal diet as instructed by your doctor. Avoid alcoholic beverages for 24 hours or as instructed. IF YOU HAD BIOPSIES TAKEN OR POLYPS REMOVED DURING THE PROCEDURE:  For the next 7 days, avoid all non-steroidal antiinflammatory medications such as Ibuprofen, Motrin, Advil, Alleve, Lila-seltzer, Goody's powder, BC powder. If you do not have an heart condition that requires you to take a daily aspirin, you should avoid taking aspirin for 7 days. Eat a soft diet for 24 hours. Monitor your stools for any blood or dark black, tar-like, stools as this may be a sign of bleeding and if you see any blood, notify your doctor immediately. GET HELP RIGHT AWAY AND SEEK IMMEDIATE MEDICAL CARE IF:  You have more than a spotting of blood in your stool. You pass clumps of tissue (blood clots) or fill the toilet with blood. Your belly is painfully swollen or puffy (abdominal distention). You throw up (vomit). You have a fever. You have redness, pain or swelling at the IV site that last greater than two days. You have abdominal pain or discomfort that is severe or gets worse throughout the day. Post-procedure recommendations:     Findings:   Rectum: medium internal and external hemorrhoids, overall normal appearing mucosa with altered vascularity - biopsied   Sigmoid: mild diverticulosis, overall normal appearing mucosa with altered vascularity - biopsied   Descending Colon: mucosa with subtle friability, few pinpoint erosions and altered vascularity - biopsied   Transverse Colon: mucosa with subtle friability, few pinpoint erosions and altered vascularity - biopsied   Ascending Colon: mucosa with subtle friability, few pinpoint erosions and altered vascularity - biopsied   Cecum: small amount of pill debris  Terminal Ileum: normal      Recommendations:   - Await pathology.  You should receive a letter within 2

## 2023-09-25 NOTE — OP NOTE
[address], [address]  [phone]                              Colonoscopy Procedure Note      Indications:   Long standing ulcerative colitis       :  Alexi Friedman MD    Staff: Circulator: Sylvia Opitz, RN; Aria Quan, RN    Referring Provider: Mitra Velasco MD    Sedation:  MAC anesthesia    Procedure Details:  After informed consent was obtained with all risks and benefits of procedure explained and preoperative exam completed, the patient was taken to the endoscopy suite and placed in the left lateral decubitus position. Upon sequential sedation as per above, a digital rectal exam was performed per below. The Olympus videocolonoscope was inserted in the rectum and carefully advanced to the terminal ileum. The quality of preparation was good. Ernest Bowel Prep Score : 3/3/3. The colonoscope was slowly withdrawn with careful evaluation between folds. Retroflexion in the rectum was performed.      Findings:   Rectum: medium internal and external hemorrhoids, overall normal appearing mucosa with altered vascularity - biopsied   Sigmoid: mild diverticulosis, overall normal appearing mucosa with altered vascularity - biopsied   Descending Colon: mucosa with subtle friability, few pinpoint erosions and altered vascularity - biopsied   Transverse Colon: mucosa with subtle friability, few pinpoint erosions and altered vascularity - biopsied   Ascending Colon: mucosa with subtle friability, few pinpoint erosions and altered vascularity - biopsied   Cecum: small amount of pill debris  Terminal Ileum: normal    Interventions:  biopsies     Specimen Removed:    ID Type Source Tests Collected by Time Destination   A : right colon bx  Tissue Colon SURGICAL PATHOLOGY Devora Vee MD 9/25/2023 0901    B :  Tissue Colon-Transverse SURGICAL PATHOLOGY Devora Vee MD 9/25/2023 0902    C : left colon bx Tissue Colon SURGICAL PATHOLOGY Rene Clark

## 2023-10-13 ENCOUNTER — HOSPITAL ENCOUNTER (OUTPATIENT)
Age: 83
End: 2023-10-13
Payer: MEDICARE

## 2023-10-13 DIAGNOSIS — K51.919 MILD CHRONIC ULCERATIVE COLITIS WITH COMPLICATION (HCC): ICD-10-CM

## 2023-10-13 DIAGNOSIS — R05.9 COUGH, UNSPECIFIED TYPE: ICD-10-CM

## 2023-10-13 DIAGNOSIS — Z80.9 FAMILY HX-MALIGNANCY: ICD-10-CM

## 2023-10-13 DIAGNOSIS — R19.7 DIARRHEA OF PRESUMED INFECTIOUS ORIGIN: ICD-10-CM

## 2023-10-13 PROCEDURE — 71046 X-RAY EXAM CHEST 2 VIEWS: CPT

## 2023-12-12 DIAGNOSIS — F33.42 MAJOR DEPRESSIVE DISORDER, RECURRENT, IN FULL REMISSION (HCC): ICD-10-CM

## 2023-12-12 DIAGNOSIS — I10 ESSENTIAL (PRIMARY) HYPERTENSION: ICD-10-CM

## 2023-12-12 DIAGNOSIS — I25.10 ATHEROSCLEROTIC HEART DISEASE OF NATIVE CORONARY ARTERY WITHOUT ANGINA PECTORIS: ICD-10-CM

## 2023-12-12 DIAGNOSIS — E11.42 TYPE 2 DIABETES MELLITUS WITH DIABETIC POLYNEUROPATHY (HCC): ICD-10-CM

## 2023-12-12 RX ORDER — EMPAGLIFLOZIN 10 MG/1
10 TABLET, FILM COATED ORAL DAILY
Qty: 90 TABLET | Refills: 1 | OUTPATIENT
Start: 2023-12-12

## 2023-12-12 RX ORDER — METFORMIN HYDROCHLORIDE 500 MG/1
TABLET, EXTENDED RELEASE ORAL
Qty: 360 TABLET | Refills: 1 | OUTPATIENT
Start: 2023-12-12

## 2023-12-12 RX ORDER — SIMVASTATIN 40 MG
40 TABLET ORAL NIGHTLY
Qty: 90 TABLET | Refills: 1 | OUTPATIENT
Start: 2023-12-12

## 2024-03-04 RX ORDER — METFORMIN HYDROCHLORIDE 500 MG/1
TABLET, EXTENDED RELEASE ORAL
Qty: 360 TABLET | Refills: 0 | OUTPATIENT
Start: 2024-03-04

## 2024-03-05 NOTE — TELEPHONE ENCOUNTER
TC to pt. Spoke with pt's wife (on PHI). Wife states pt is wanting to come back to see Dr. Joshua.

## 2024-03-05 NOTE — TELEPHONE ENCOUNTER
Please call patient.  Previously had to change due to insurance issues.  Is he transferring back??    Future Appointments   Date Time Provider Department Center   3/26/2024 10:20 AM Gamaliel Joshua MD [facility]

## 2024-03-08 RX ORDER — METFORMIN HYDROCHLORIDE 500 MG/1
TABLET, EXTENDED RELEASE ORAL
Qty: 360 TABLET | Refills: 0 | Status: SHIPPED | OUTPATIENT
Start: 2024-03-08

## 2024-03-08 NOTE — TELEPHONE ENCOUNTER
Spoke with [facility] pharmacist Willem, canceled Metformin refill per 's request.

## 2024-03-08 NOTE — TELEPHONE ENCOUNTER
Chief Complaint   Patient presents with    Medication Refill     Last Appointment with Dr. Gamaliel Joshua: 3/21/23    Future Appointments   Date Time Provider Department Center   3/26/2024 10:20 AM Gamaliel Joshua MD [facility]

## 2024-03-08 NOTE — TELEPHONE ENCOUNTER
Pt not seen in +1 yr.  He has appointment to re-establish later this month.  Needs to get meds from current PCP.  Please call pharmacy and cancel refill.

## 2024-03-11 RX ORDER — METFORMIN HYDROCHLORIDE 500 MG/1
1000 TABLET, EXTENDED RELEASE ORAL 2 TIMES DAILY
Qty: 360 TABLET | Refills: 0 | OUTPATIENT
Start: 2024-03-11

## 2024-03-24 SDOH — ECONOMIC STABILITY: INCOME INSECURITY: HOW HARD IS IT FOR YOU TO PAY FOR THE VERY BASICS LIKE FOOD, HOUSING, MEDICAL CARE, AND HEATING?: NOT VERY HARD

## 2024-03-24 SDOH — ECONOMIC STABILITY: FOOD INSECURITY: WITHIN THE PAST 12 MONTHS, THE FOOD YOU BOUGHT JUST DIDN'T LAST AND YOU DIDN'T HAVE MONEY TO GET MORE.: NEVER TRUE

## 2024-03-24 SDOH — ECONOMIC STABILITY: HOUSING INSECURITY
IN THE LAST 12 MONTHS, WAS THERE A TIME WHEN YOU DID NOT HAVE A STEADY PLACE TO SLEEP OR SLEPT IN A SHELTER (INCLUDING NOW)?: NO

## 2024-03-24 SDOH — ECONOMIC STABILITY: TRANSPORTATION INSECURITY
IN THE PAST 12 MONTHS, HAS LACK OF TRANSPORTATION KEPT YOU FROM MEETINGS, WORK, OR FROM GETTING THINGS NEEDED FOR DAILY LIVING?: NO

## 2024-03-24 SDOH — ECONOMIC STABILITY: FOOD INSECURITY: WITHIN THE PAST 12 MONTHS, YOU WORRIED THAT YOUR FOOD WOULD RUN OUT BEFORE YOU GOT MONEY TO BUY MORE.: NEVER TRUE

## 2024-03-26 ENCOUNTER — OFFICE VISIT (OUTPATIENT)
Age: 84
End: 2024-03-26
Payer: MEDICARE

## 2024-03-26 VITALS
OXYGEN SATURATION: 97 % | WEIGHT: 209.8 LBS | HEIGHT: 71 IN | RESPIRATION RATE: 16 BRPM | TEMPERATURE: 97.3 F | SYSTOLIC BLOOD PRESSURE: 131 MMHG | BODY MASS INDEX: 29.37 KG/M2 | HEART RATE: 69 BPM | DIASTOLIC BLOOD PRESSURE: 61 MMHG

## 2024-03-26 DIAGNOSIS — F33.42 RECURRENT MAJOR DEPRESSIVE DISORDER, IN FULL REMISSION (HCC): ICD-10-CM

## 2024-03-26 DIAGNOSIS — E11.42 CONTROLLED TYPE 2 DIABETES MELLITUS WITH DIABETIC POLYNEUROPATHY, WITHOUT LONG-TERM CURRENT USE OF INSULIN (HCC): Primary | ICD-10-CM

## 2024-03-26 DIAGNOSIS — E78.00 PURE HYPERCHOLESTEROLEMIA: ICD-10-CM

## 2024-03-26 DIAGNOSIS — K51.919 ULCERATIVE COLITIS WITH COMPLICATION, UNSPECIFIED LOCATION (HCC): ICD-10-CM

## 2024-03-26 DIAGNOSIS — C02.9 SQUAMOUS CELL CARCINOMA OF TONGUE (HCC): ICD-10-CM

## 2024-03-26 DIAGNOSIS — I25.10 CORONARY ARTERY DISEASE INVOLVING NATIVE CORONARY ARTERY OF NATIVE HEART WITHOUT ANGINA PECTORIS: ICD-10-CM

## 2024-03-26 DIAGNOSIS — I10 ESSENTIAL HYPERTENSION, BENIGN: ICD-10-CM

## 2024-03-26 DIAGNOSIS — Z87.39 HISTORY OF POLYMYALGIA RHEUMATICA: ICD-10-CM

## 2024-03-26 PROCEDURE — G2211 COMPLEX E/M VISIT ADD ON: HCPCS | Performed by: INTERNAL MEDICINE

## 2024-03-26 PROCEDURE — 3078F DIAST BP <80 MM HG: CPT | Performed by: INTERNAL MEDICINE

## 2024-03-26 PROCEDURE — 99214 OFFICE O/P EST MOD 30 MIN: CPT | Performed by: INTERNAL MEDICINE

## 2024-03-26 PROCEDURE — 3075F SYST BP GE 130 - 139MM HG: CPT | Performed by: INTERNAL MEDICINE

## 2024-03-26 PROCEDURE — 93010 ELECTROCARDIOGRAM REPORT: CPT | Performed by: INTERNAL MEDICINE

## 2024-03-26 PROCEDURE — 1036F TOBACCO NON-USER: CPT | Performed by: INTERNAL MEDICINE

## 2024-03-26 PROCEDURE — G8427 DOCREV CUR MEDS BY ELIG CLIN: HCPCS | Performed by: INTERNAL MEDICINE

## 2024-03-26 PROCEDURE — 93005 ELECTROCARDIOGRAM TRACING: CPT | Performed by: INTERNAL MEDICINE

## 2024-03-26 PROCEDURE — G8484 FLU IMMUNIZE NO ADMIN: HCPCS | Performed by: INTERNAL MEDICINE

## 2024-03-26 PROCEDURE — G8419 CALC BMI OUT NRM PARAM NOF/U: HCPCS | Performed by: INTERNAL MEDICINE

## 2024-03-26 PROCEDURE — 1123F ACP DISCUSS/DSCN MKR DOCD: CPT | Performed by: INTERNAL MEDICINE

## 2024-03-26 RX ORDER — METFORMIN HYDROCHLORIDE 500 MG/1
1000 TABLET, EXTENDED RELEASE ORAL 2 TIMES DAILY
Qty: 360 TABLET | Refills: 1 | Status: SHIPPED | OUTPATIENT
Start: 2024-03-26

## 2024-03-26 RX ORDER — SIMVASTATIN 40 MG
40 TABLET ORAL NIGHTLY
Qty: 90 TABLET | Refills: 1 | Status: SHIPPED | OUTPATIENT
Start: 2024-03-26

## 2024-03-26 RX ORDER — GABAPENTIN 300 MG/1
300 CAPSULE ORAL NIGHTLY
Qty: 90 CAPSULE | Refills: 1 | Status: SHIPPED | OUTPATIENT
Start: 2024-03-26 | End: 2024-09-22

## 2024-03-26 SDOH — ECONOMIC STABILITY: FOOD INSECURITY: WITHIN THE PAST 12 MONTHS, YOU WORRIED THAT YOUR FOOD WOULD RUN OUT BEFORE YOU GOT MONEY TO BUY MORE.: NEVER TRUE

## 2024-03-26 SDOH — ECONOMIC STABILITY: FOOD INSECURITY: WITHIN THE PAST 12 MONTHS, THE FOOD YOU BOUGHT JUST DIDN'T LAST AND YOU DIDN'T HAVE MONEY TO GET MORE.: NEVER TRUE

## 2024-03-26 SDOH — ECONOMIC STABILITY: INCOME INSECURITY: HOW HARD IS IT FOR YOU TO PAY FOR THE VERY BASICS LIKE FOOD, HOUSING, MEDICAL CARE, AND HEATING?: NOT HARD AT ALL

## 2024-03-26 ASSESSMENT — PATIENT HEALTH QUESTIONNAIRE - PHQ9
SUM OF ALL RESPONSES TO PHQ QUESTIONS 1-9: 0
SUM OF ALL RESPONSES TO PHQ QUESTIONS 1-9: 0
1. LITTLE INTEREST OR PLEASURE IN DOING THINGS: NOT AT ALL
7. TROUBLE CONCENTRATING ON THINGS, SUCH AS READING THE NEWSPAPER OR WATCHING TELEVISION: NOT AT ALL
10. IF YOU CHECKED OFF ANY PROBLEMS, HOW DIFFICULT HAVE THESE PROBLEMS MADE IT FOR YOU TO DO YOUR WORK, TAKE CARE OF THINGS AT HOME, OR GET ALONG WITH OTHER PEOPLE: NOT DIFFICULT AT ALL
4. FEELING TIRED OR HAVING LITTLE ENERGY: NOT AT ALL
3. TROUBLE FALLING OR STAYING ASLEEP: NOT AT ALL
6. FEELING BAD ABOUT YOURSELF - OR THAT YOU ARE A FAILURE OR HAVE LET YOURSELF OR YOUR FAMILY DOWN: NOT AT ALL
SUM OF ALL RESPONSES TO PHQ9 QUESTIONS 1 & 2: 0
2. FEELING DOWN, DEPRESSED OR HOPELESS: NOT AT ALL
9. THOUGHTS THAT YOU WOULD BE BETTER OFF DEAD, OR OF HURTING YOURSELF: NOT AT ALL
SUM OF ALL RESPONSES TO PHQ QUESTIONS 1-9: 0
5. POOR APPETITE OR OVEREATING: NOT AT ALL
SUM OF ALL RESPONSES TO PHQ QUESTIONS 1-9: 0
8. MOVING OR SPEAKING SO SLOWLY THAT OTHER PEOPLE COULD HAVE NOTICED. OR THE OPPOSITE, BEING SO FIGETY OR RESTLESS THAT YOU HAVE BEEN MOVING AROUND A LOT MORE THAN USUAL: NOT AT ALL

## 2024-03-26 ASSESSMENT — ENCOUNTER SYMPTOMS
BLOOD IN STOOL: 0
COUGH: 0
SHORTNESS OF BREATH: 0
NAUSEA: 0
ABDOMINAL PAIN: 0
CONSTIPATION: 0
DIARRHEA: 0
VOMITING: 0

## 2024-03-26 NOTE — PROGRESS NOTES
Haider Leonard is a 83 y.o. male who was seen in clinic today (3/26/2024).    Assessment & Plan:   Below is the assessment and plan developed based on review of pertinent history, physical exam, labs, studies, and medications.    1. Controlled type 2 diabetes mellitus with diabetic polyneuropathy, without long-term current use of insulin ([facility])  Assessment & Plan:  Unclear control, previously well controlled, continue current treatment pending review of labs    Orders:  -     gabapentin (NEURONTIN) 300 MG capsule; Take 1 capsule by mouth at bedtime for 180 days. Max Daily Amount: 300 mg, Disp-90 capsule, R-1Normal  -     empagliflozin (JARDIANCE) 10 MG tablet; Take 1 tablet by mouth daily, Disp-90 tablet, R-1Normal  -     metFORMIN (GLUCOPHAGE-XR) 500 MG extended release tablet; Take 2 tablets by mouth 2 times daily, Disp-360 tablet, R-1Normal  -     simvastatin (ZOCOR) 40 MG tablet; Take 1 tablet by mouth nightly, Disp-90 tablet, R-1Normal  -     Comprehensive Metabolic Panel; Future  -     CBC; Future  -     Hemoglobin A1C; Future  -     Lipid Panel; Future  -     Microalbumin / Creatinine Urine Ratio; Future  2. Coronary artery disease involving native coronary artery of native heart without angina pectoris  Assessment & Plan:  well controlled, asymptomatic.  BP is well controlled, lipids are well controlled.  Continue: current plan pending review of labs.     Orders:  -     metoprolol tartrate (LOPRESSOR) 25 MG tablet; Take 0.5 tablets by mouth 2 times daily, Disp-90 tablet, R-1Normal  -     simvastatin (ZOCOR) 40 MG tablet; Take 1 tablet by mouth nightly, Disp-90 tablet, R-1Normal  -     Comprehensive Metabolic Panel; Future  -     Lipid Panel; Future  -     AMB POC EKG ROUTINE  3. Essential hypertension, benign  Assessment & Plan:  well controlled, continue current treatment pending review of labs    Orders:  -     metoprolol tartrate (LOPRESSOR) 25 MG tablet; Take 0.5 tablets by mouth 2 times daily, Disp-90

## 2024-03-26 NOTE — ASSESSMENT & PLAN NOTE
well controlled, asymptomatic.  BP is well controlled, lipids are well controlled.  Continue: current plan pending review of labs.

## 2024-04-02 DIAGNOSIS — I10 ESSENTIAL HYPERTENSION, BENIGN: ICD-10-CM

## 2024-04-02 DIAGNOSIS — E11.42 CONTROLLED TYPE 2 DIABETES MELLITUS WITH DIABETIC POLYNEUROPATHY, WITHOUT LONG-TERM CURRENT USE OF INSULIN (HCC): ICD-10-CM

## 2024-04-02 DIAGNOSIS — E78.00 PURE HYPERCHOLESTEROLEMIA: ICD-10-CM

## 2024-04-02 DIAGNOSIS — I25.10 CORONARY ARTERY DISEASE INVOLVING NATIVE CORONARY ARTERY OF NATIVE HEART WITHOUT ANGINA PECTORIS: ICD-10-CM

## 2024-04-02 LAB
ALBUMIN SERPL-MCNC: 3.8 G/DL (ref 3.5–5)
ALBUMIN/GLOB SERPL: 1.1 (ref 1.1–2.2)
ALP SERPL-CCNC: 110 U/L (ref 45–117)
ALT SERPL-CCNC: 27 U/L (ref 12–78)
ANION GAP SERPL CALC-SCNC: 3 MMOL/L (ref 5–15)
AST SERPL-CCNC: 21 U/L (ref 15–37)
BILIRUB SERPL-MCNC: 0.4 MG/DL (ref 0.2–1)
BUN SERPL-MCNC: 13 MG/DL (ref 6–20)
BUN/CREAT SERPL: 13 (ref 12–20)
CALCIUM SERPL-MCNC: 9.7 MG/DL (ref 8.5–10.1)
CHLORIDE SERPL-SCNC: 104 MMOL/L (ref 97–108)
CHOLEST SERPL-MCNC: 151 MG/DL
CO2 SERPL-SCNC: 32 MMOL/L (ref 21–32)
CREAT SERPL-MCNC: 0.98 MG/DL (ref 0.7–1.3)
CREAT UR-MCNC: 96.7 MG/DL
ERYTHROCYTE [DISTWIDTH] IN BLOOD BY AUTOMATED COUNT: 14.2 % (ref 11.5–14.5)
EST. AVERAGE GLUCOSE BLD GHB EST-MCNC: 169 MG/DL
GLOBULIN SER CALC-MCNC: 3.6 G/DL (ref 2–4)
GLUCOSE SERPL-MCNC: 161 MG/DL (ref 65–100)
HBA1C MFR BLD: 7.5 % (ref 4–5.6)
HCT VFR BLD AUTO: 46 % (ref 36.6–50.3)
HDLC SERPL-MCNC: 58 MG/DL
HDLC SERPL: 2.6 (ref 0–5)
HGB BLD-MCNC: 14.9 G/DL (ref 12.1–17)
LDLC SERPL CALC-MCNC: 77.8 MG/DL (ref 0–100)
MCH RBC QN AUTO: 28.7 PG (ref 26–34)
MCHC RBC AUTO-ENTMCNC: 32.4 G/DL (ref 30–36.5)
MCV RBC AUTO: 88.5 FL (ref 80–99)
MICROALBUMIN UR-MCNC: 1.41 MG/DL
MICROALBUMIN/CREAT UR-RTO: 15 MG/G (ref 0–30)
NRBC # BLD: 0 K/UL (ref 0–0.01)
NRBC BLD-RTO: 0 PER 100 WBC
PLATELET # BLD AUTO: 177 K/UL (ref 150–400)
PMV BLD AUTO: 10 FL (ref 8.9–12.9)
POTASSIUM SERPL-SCNC: 4.9 MMOL/L (ref 3.5–5.1)
PROT SERPL-MCNC: 7.4 G/DL (ref 6.4–8.2)
RBC # BLD AUTO: 5.2 M/UL (ref 4.1–5.7)
SODIUM SERPL-SCNC: 139 MMOL/L (ref 136–145)
TRIGL SERPL-MCNC: 76 MG/DL
VLDLC SERPL CALC-MCNC: 15.2 MG/DL
WBC # BLD AUTO: 8.6 K/UL (ref 4.1–11.1)

## 2024-04-08 NOTE — RESULT ENCOUNTER NOTE
Results released to patient via "[employer]  All labs are stable or at goal for him.  DM control is exceptable but is worse.  No medication changes, just life style changes and work on weight.

## 2024-08-09 ENCOUNTER — OFFICE VISIT (OUTPATIENT)
Age: 84
End: 2024-08-09
Payer: MEDICARE

## 2024-08-09 VITALS
HEART RATE: 65 BPM | HEIGHT: 71 IN | BODY MASS INDEX: 29.68 KG/M2 | DIASTOLIC BLOOD PRESSURE: 73 MMHG | RESPIRATION RATE: 15 BRPM | WEIGHT: 212 LBS | OXYGEN SATURATION: 96 % | SYSTOLIC BLOOD PRESSURE: 117 MMHG | TEMPERATURE: 97.4 F

## 2024-08-09 DIAGNOSIS — H61.23 IMPACTED CERUMEN OF BOTH EARS: Primary | ICD-10-CM

## 2024-08-09 PROCEDURE — 99213 OFFICE O/P EST LOW 20 MIN: CPT | Performed by: NURSE PRACTITIONER

## 2024-08-09 PROCEDURE — 3078F DIAST BP <80 MM HG: CPT | Performed by: NURSE PRACTITIONER

## 2024-08-09 PROCEDURE — G8427 DOCREV CUR MEDS BY ELIG CLIN: HCPCS | Performed by: NURSE PRACTITIONER

## 2024-08-09 PROCEDURE — 3074F SYST BP LT 130 MM HG: CPT | Performed by: NURSE PRACTITIONER

## 2024-08-09 PROCEDURE — 1123F ACP DISCUSS/DSCN MKR DOCD: CPT | Performed by: NURSE PRACTITIONER

## 2024-08-09 PROCEDURE — 1036F TOBACCO NON-USER: CPT | Performed by: NURSE PRACTITIONER

## 2024-08-09 PROCEDURE — G8419 CALC BMI OUT NRM PARAM NOF/U: HCPCS | Performed by: NURSE PRACTITIONER

## 2024-08-09 PROCEDURE — PBSHW REMOVAL IMPACTED CERUMEN IRRIGATION/LVG UNILAT: Performed by: NURSE PRACTITIONER

## 2024-08-09 PROCEDURE — 69209 REMOVE IMPACTED EAR WAX UNI: CPT | Performed by: NURSE PRACTITIONER

## 2024-08-09 NOTE — PROGRESS NOTES
Haider Leonard (:  1940) is a 83 y.o. male,here for evaluation of the following chief complaint(s):  Ear Fullness    /73   Pulse 65   Temp 97.4 °F (36.3 °C)   Resp 15   Ht 1.803 m (5' 11\")   Wt 96.2 kg (212 lb)   SpO2 96%   BMI 29.57 kg/m²       SUBJECTIVE/OBJECTIVE:    HPI:Patient reports bilateral impacted cerumen. He wears hearing aids. He has chronic hearing loss and recurrent wax build up.    Review of Systems   HENT:  Positive for hearing loss.        Physical Exam  Constitutional:       Appearance: Normal appearance.   HENT:      Head: Normocephalic.      Right Ear: Hearing normal. There is impacted cerumen.      Left Ear: Hearing normal. There is impacted cerumen.   Skin:     General: Skin is warm and dry.   Neurological:      General: No focal deficit present.      Mental Status: He is alert and oriented to person, place, and time.   Psychiatric:         Mood and Affect: Mood normal.         Behavior: Behavior normal.          ASSESSMENT/PLAN:  1. Impacted cerumen of both ears  Cerumen removed with lighted curette; tolerate well with mild discomfort; TMs normal  Debrox at least twice monthly for maintenance    --ADIEL Noland NP

## 2024-09-04 DIAGNOSIS — I10 ESSENTIAL HYPERTENSION, BENIGN: ICD-10-CM

## 2024-09-04 DIAGNOSIS — F33.42 RECURRENT MAJOR DEPRESSIVE DISORDER, IN FULL REMISSION (HCC): ICD-10-CM

## 2024-09-04 DIAGNOSIS — E11.42 CONTROLLED TYPE 2 DIABETES MELLITUS WITH DIABETIC POLYNEUROPATHY, WITHOUT LONG-TERM CURRENT USE OF INSULIN (HCC): ICD-10-CM

## 2024-09-04 DIAGNOSIS — E78.00 PURE HYPERCHOLESTEROLEMIA: ICD-10-CM

## 2024-09-04 DIAGNOSIS — I25.10 CORONARY ARTERY DISEASE INVOLVING NATIVE CORONARY ARTERY OF NATIVE HEART WITHOUT ANGINA PECTORIS: ICD-10-CM

## 2024-09-04 RX ORDER — METOPROLOL TARTRATE 25 MG/1
TABLET, FILM COATED ORAL
Qty: 90 TABLET | Refills: 0 | Status: SHIPPED | OUTPATIENT
Start: 2024-09-04

## 2024-09-04 RX ORDER — SIMVASTATIN 40 MG
40 TABLET ORAL NIGHTLY
Qty: 90 TABLET | Refills: 0 | Status: SHIPPED | OUTPATIENT
Start: 2024-09-04

## 2024-09-04 NOTE — TELEPHONE ENCOUNTER
Chief Complaint   Patient presents with    Medication Refill     Last Appointment with Walter Hernandez NP 8/9/24    Future Appointments   Date Time Provider Department Center   9/30/2024 12:20 PM Gamaliel Joshua MD [facility] DEP   [facility]

## 2024-09-06 DIAGNOSIS — E11.42 CONTROLLED TYPE 2 DIABETES MELLITUS WITH DIABETIC POLYNEUROPATHY, WITHOUT LONG-TERM CURRENT USE OF INSULIN (HCC): ICD-10-CM

## 2024-09-06 RX ORDER — EMPAGLIFLOZIN 10 MG/1
10 TABLET, FILM COATED ORAL DAILY
Qty: 90 TABLET | Refills: 1 | Status: SHIPPED | OUTPATIENT
Start: 2024-09-06

## 2024-09-06 NOTE — TELEPHONE ENCOUNTER
Future Appointments   Date Time Provider Department Center   9/30/2024 12:20 PM Gamaliel Joshua MD [facility] DEP

## 2024-09-17 DIAGNOSIS — E11.42 CONTROLLED TYPE 2 DIABETES MELLITUS WITH DIABETIC POLYNEUROPATHY, WITHOUT LONG-TERM CURRENT USE OF INSULIN (HCC): ICD-10-CM

## 2024-09-17 RX ORDER — METFORMIN HCL 500 MG
1000 TABLET, EXTENDED RELEASE 24 HR ORAL 2 TIMES DAILY
Qty: 360 TABLET | Refills: 0 | Status: SHIPPED | OUTPATIENT
Start: 2024-09-17

## 2024-09-26 SDOH — HEALTH STABILITY: PHYSICAL HEALTH: ON AVERAGE, HOW MANY DAYS PER WEEK DO YOU ENGAGE IN MODERATE TO STRENUOUS EXERCISE (LIKE A BRISK WALK)?: 0 DAYS

## 2024-09-26 ASSESSMENT — LIFESTYLE VARIABLES
HOW MANY STANDARD DRINKS CONTAINING ALCOHOL DO YOU HAVE ON A TYPICAL DAY: PATIENT DOES NOT DRINK
HOW OFTEN DO YOU HAVE A DRINK CONTAINING ALCOHOL: 1
HOW MANY STANDARD DRINKS CONTAINING ALCOHOL DO YOU HAVE ON A TYPICAL DAY: 0
HOW OFTEN DO YOU HAVE A DRINK CONTAINING ALCOHOL: NEVER
HOW OFTEN DO YOU HAVE SIX OR MORE DRINKS ON ONE OCCASION: 1

## 2024-09-26 ASSESSMENT — PATIENT HEALTH QUESTIONNAIRE - PHQ9
SUM OF ALL RESPONSES TO PHQ9 QUESTIONS 1 & 2: 2
SUM OF ALL RESPONSES TO PHQ QUESTIONS 1-9: 2
2. FEELING DOWN, DEPRESSED OR HOPELESS: SEVERAL DAYS
1. LITTLE INTEREST OR PLEASURE IN DOING THINGS: SEVERAL DAYS
SUM OF ALL RESPONSES TO PHQ QUESTIONS 1-9: 2

## 2024-09-30 ENCOUNTER — OFFICE VISIT (OUTPATIENT)
Age: 84
End: 2024-09-30
Payer: MEDICARE

## 2024-09-30 VITALS
BODY MASS INDEX: 29.12 KG/M2 | DIASTOLIC BLOOD PRESSURE: 63 MMHG | RESPIRATION RATE: 16 BRPM | OXYGEN SATURATION: 99 % | HEART RATE: 54 BPM | TEMPERATURE: 97 F | SYSTOLIC BLOOD PRESSURE: 123 MMHG | HEIGHT: 71 IN | WEIGHT: 208 LBS

## 2024-09-30 DIAGNOSIS — F33.42 RECURRENT MAJOR DEPRESSIVE DISORDER, IN FULL REMISSION (HCC): ICD-10-CM

## 2024-09-30 DIAGNOSIS — I25.10 CORONARY ARTERY DISEASE INVOLVING NATIVE CORONARY ARTERY OF NATIVE HEART WITHOUT ANGINA PECTORIS: ICD-10-CM

## 2024-09-30 DIAGNOSIS — R15.9 INCONTINENCE OF FECES WITH FECAL URGENCY: ICD-10-CM

## 2024-09-30 DIAGNOSIS — E78.00 PURE HYPERCHOLESTEROLEMIA: ICD-10-CM

## 2024-09-30 DIAGNOSIS — G47.33 OSA (OBSTRUCTIVE SLEEP APNEA): ICD-10-CM

## 2024-09-30 DIAGNOSIS — I10 ESSENTIAL HYPERTENSION, BENIGN: ICD-10-CM

## 2024-09-30 DIAGNOSIS — C02.9 SQUAMOUS CELL CARCINOMA OF TONGUE (HCC): ICD-10-CM

## 2024-09-30 DIAGNOSIS — E11.42 CONTROLLED TYPE 2 DIABETES MELLITUS WITH DIABETIC POLYNEUROPATHY, WITHOUT LONG-TERM CURRENT USE OF INSULIN (HCC): ICD-10-CM

## 2024-09-30 DIAGNOSIS — Z00.00 MEDICARE ANNUAL WELLNESS VISIT, SUBSEQUENT: Primary | ICD-10-CM

## 2024-09-30 DIAGNOSIS — Z91.81 AT RISK FOR FALLING: ICD-10-CM

## 2024-09-30 DIAGNOSIS — K51.919 ULCERATIVE COLITIS WITH COMPLICATION, UNSPECIFIED LOCATION (HCC): ICD-10-CM

## 2024-09-30 DIAGNOSIS — Z71.89 ADVANCED CARE PLANNING/COUNSELING DISCUSSION: ICD-10-CM

## 2024-09-30 DIAGNOSIS — R15.2 INCONTINENCE OF FECES WITH FECAL URGENCY: ICD-10-CM

## 2024-09-30 LAB
ALBUMIN SERPL-MCNC: 3.8 G/DL (ref 3.5–5)
ALBUMIN/GLOB SERPL: 1 (ref 1.1–2.2)
ALP SERPL-CCNC: 104 U/L (ref 45–117)
ALT SERPL-CCNC: 20 U/L (ref 12–78)
ANION GAP SERPL CALC-SCNC: 6 MMOL/L (ref 2–12)
AST SERPL-CCNC: 16 U/L (ref 15–37)
BILIRUB SERPL-MCNC: 0.5 MG/DL (ref 0.2–1)
BUN SERPL-MCNC: 17 MG/DL (ref 6–20)
BUN/CREAT SERPL: 16 (ref 12–20)
CALCIUM SERPL-MCNC: 10.6 MG/DL (ref 8.5–10.1)
CHLORIDE SERPL-SCNC: 105 MMOL/L (ref 97–108)
CO2 SERPL-SCNC: 29 MMOL/L (ref 21–32)
CREAT SERPL-MCNC: 1.09 MG/DL (ref 0.7–1.3)
EST. AVERAGE GLUCOSE BLD GHB EST-MCNC: 166 MG/DL
GLOBULIN SER CALC-MCNC: 3.8 G/DL (ref 2–4)
GLUCOSE SERPL-MCNC: 143 MG/DL (ref 65–100)
HBA1C MFR BLD: 7.4 % (ref 4–5.6)
POTASSIUM SERPL-SCNC: 4.3 MMOL/L (ref 3.5–5.1)
PROT SERPL-MCNC: 7.6 G/DL (ref 6.4–8.2)
SODIUM SERPL-SCNC: 140 MMOL/L (ref 136–145)

## 2024-09-30 PROCEDURE — 3051F HG A1C>EQUAL 7.0%<8.0%: CPT | Performed by: INTERNAL MEDICINE

## 2024-09-30 PROCEDURE — 99214 OFFICE O/P EST MOD 30 MIN: CPT | Performed by: INTERNAL MEDICINE

## 2024-09-30 PROCEDURE — G8427 DOCREV CUR MEDS BY ELIG CLIN: HCPCS | Performed by: INTERNAL MEDICINE

## 2024-09-30 PROCEDURE — G8419 CALC BMI OUT NRM PARAM NOF/U: HCPCS | Performed by: INTERNAL MEDICINE

## 2024-09-30 PROCEDURE — 3074F SYST BP LT 130 MM HG: CPT | Performed by: INTERNAL MEDICINE

## 2024-09-30 PROCEDURE — 3078F DIAST BP <80 MM HG: CPT | Performed by: INTERNAL MEDICINE

## 2024-09-30 PROCEDURE — 1123F ACP DISCUSS/DSCN MKR DOCD: CPT | Performed by: INTERNAL MEDICINE

## 2024-09-30 PROCEDURE — 1036F TOBACCO NON-USER: CPT | Performed by: INTERNAL MEDICINE

## 2024-09-30 PROCEDURE — G0439 PPPS, SUBSEQ VISIT: HCPCS | Performed by: INTERNAL MEDICINE

## 2024-09-30 ASSESSMENT — ENCOUNTER SYMPTOMS
ABDOMINAL PAIN: 0
VOMITING: 0
DIARRHEA: 0
SHORTNESS OF BREATH: 0
CONSTIPATION: 0
BLOOD IN STOOL: 0
COUGH: 0
NAUSEA: 0

## 2024-09-30 NOTE — ASSESSMENT & PLAN NOTE
Stable.  They reports colitis is well controlled & incontinence is a different issue.  Monitored by specialist- no acute findings meriting change in the plan

## 2024-09-30 NOTE — ACP (ADVANCE CARE PLANNING)
Advance Care Planning   Advance Care Planning (ACP) Physician/NP/PA (Provider) Conversation    Date of ACP Conversation: 09/30/24  Persons included in Conversation:  patient and spouse  Length of ACP Conversation in minutes: <16 minutes (Non-Billable)    We discussed the patient’s choices for care and treatment preferences in case of a health event that adversely affects decision-making abilities or is life-limiting. We discusses the differences between FULL CODE and DNR and when to consider a change in code status.  The limitations with CPR were reviewed.    Has ACP document(s) on file - reflects the patient's care preferences.  He elects Full Code (Attempt Resuscitation)    The patient has appointed the following active healthcare agents:    Primary Decision Maker: Laila Leonard - Spouse - [phone]    Secondary Decision Maker: Yusuf Leonard - Grandchild     Gamaliel Joshua MD

## 2024-09-30 NOTE — ASSESSMENT & PLAN NOTE
At goal of his age and risk factors, goal is < 8% but closer to 7%.  No changes pending review of labs.  If worse we did review increasing Jardiance and reducing MTF.  He reports Gabapentin is working well and he is taking his medication(s) as directed & without any side effects.

## 2024-09-30 NOTE — PROGRESS NOTES
Medicare Annual Wellness Visit    Haider Leonard is here for Follow-up    Assessment & Plan   Medicare annual wellness visit, subsequent  Advanced care planning/counseling discussion  At risk for falling  Comments:  chronic issue, worsening, doubt medication related, likely due to neuropathy. Will start w/ gait training  Orders:  -     [facility] - Physical Therapy at [facility]  Controlled type 2 diabetes mellitus with diabetic polyneuropathy, without long-term current use of insulin (HCC)  Assessment & Plan:  At goal of his age and risk factors, goal is < 8% but closer to 7%.  No changes pending review of labs.  If worse we did review increasing Jardiance and reducing MTF.  He reports Gabapentin is working well and he is taking his medication(s) as directed & without any side effects.   Orders:  -     Comprehensive Metabolic Panel; Future  -     Hemoglobin A1C; Future  Coronary artery disease involving native coronary artery of native heart without angina pectoris  Assessment & Plan:  well controlled, asymptomatic.  BP is well controlled, lipids are well controlled.  Continue: current plan pending review of labs.     Orders:  -     Comprehensive Metabolic Panel; Future  Essential hypertension, benign  Assessment & Plan:   Chronic, at goal (stable), continue current plan pending work up below  Orders:  -     Comprehensive Metabolic Panel; Future  Pure hypercholesterolemia  Assessment & Plan:   Chronic, at goal (stable), continue current plan pending work up below  Orders:  -     Comprehensive Metabolic Panel; Future  Recurrent major depressive disorder, in full remission (HCC)  Assessment & Plan:  stable and well controlled, I reviewed treatment options with him, I reviewed life style changes to help improve mood, continue current treatment.    Squamous cell carcinoma of tongue (HCC)  Assessment & Plan:  Asymptomatic. Monitored by specialist- no acute findings meriting change in the plan  Ulcerative colitis

## 2024-09-30 NOTE — ASSESSMENT & PLAN NOTE
Not using CPAP regularly, reviewed rational for treatment and would recommend following up with specialist and consider restarting

## 2024-10-01 DIAGNOSIS — E83.52 HYPERCALCEMIA: Primary | ICD-10-CM

## 2024-10-01 NOTE — RESULT ENCOUNTER NOTE
Results released to patient via [employer].  All labs are stable or at goal for him, except for Calcium.  First time elevated, doesn't correct for albumin.  Likely lab error but will repeat labs to verify.  Due to med side effects will offer increase in jardiance and reduce/stop MTF as DM control is stable.

## 2024-10-04 DIAGNOSIS — E83.52 HYPERCALCEMIA: ICD-10-CM

## 2024-10-04 LAB — CALCIUM SERPL-MCNC: 9.5 MG/DL (ref 8.5–10.1)

## 2024-10-05 DIAGNOSIS — E11.42 CONTROLLED TYPE 2 DIABETES MELLITUS WITH DIABETIC POLYNEUROPATHY, WITHOUT LONG-TERM CURRENT USE OF INSULIN (HCC): ICD-10-CM

## 2024-10-07 DIAGNOSIS — E11.42 CONTROLLED TYPE 2 DIABETES MELLITUS WITH DIABETIC POLYNEUROPATHY, WITHOUT LONG-TERM CURRENT USE OF INSULIN (HCC): ICD-10-CM

## 2024-10-07 LAB — CA-I SERPL ISE-MCNC: 5.1 MG/DL (ref 4.5–5.6)

## 2024-10-07 RX ORDER — METFORMIN HCL 500 MG
500 TABLET, EXTENDED RELEASE 24 HR ORAL 2 TIMES DAILY
Qty: 180 TABLET | Refills: 1 | Status: SHIPPED | OUTPATIENT
Start: 2024-10-07

## 2024-10-07 RX ORDER — GABAPENTIN 300 MG/1
300 CAPSULE ORAL NIGHTLY
Qty: 90 CAPSULE | Refills: 1 | Status: SHIPPED | OUTPATIENT
Start: 2024-10-07 | End: 2025-04-05

## 2024-10-07 NOTE — TELEPHONE ENCOUNTER
Chart reviewed  Last filled on: 7/10/24 - #90  Last visit: 9/30/2024    PDMP reviewed on 10/7/2024  8:12 AM.    Future Appointments   Date Time Provider Department Center   4/1/2025  9:40 AM Gamaliel Joshua MD [facility] DEP

## 2025-01-12 DIAGNOSIS — E11.42 CONTROLLED TYPE 2 DIABETES MELLITUS WITH DIABETIC POLYNEUROPATHY, WITHOUT LONG-TERM CURRENT USE OF INSULIN (HCC): ICD-10-CM

## 2025-01-12 DIAGNOSIS — I10 ESSENTIAL HYPERTENSION, BENIGN: ICD-10-CM

## 2025-01-12 DIAGNOSIS — I25.10 CORONARY ARTERY DISEASE INVOLVING NATIVE CORONARY ARTERY OF NATIVE HEART WITHOUT ANGINA PECTORIS: ICD-10-CM

## 2025-01-12 DIAGNOSIS — E78.00 PURE HYPERCHOLESTEROLEMIA: ICD-10-CM

## 2025-01-12 DIAGNOSIS — F33.42 RECURRENT MAJOR DEPRESSIVE DISORDER, IN FULL REMISSION (HCC): ICD-10-CM

## 2025-01-13 RX ORDER — EMPAGLIFLOZIN 25 MG/1
25 TABLET, FILM COATED ORAL DAILY
Qty: 90 TABLET | Refills: 1 | OUTPATIENT
Start: 2025-01-13

## 2025-01-13 RX ORDER — SIMVASTATIN 40 MG
40 TABLET ORAL NIGHTLY
Qty: 90 TABLET | Refills: 0 | Status: SHIPPED | OUTPATIENT
Start: 2025-01-13

## 2025-01-13 RX ORDER — METOPROLOL TARTRATE 25 MG/1
TABLET, FILM COATED ORAL
Qty: 90 TABLET | Refills: 0 | Status: SHIPPED | OUTPATIENT
Start: 2025-01-13

## 2025-01-27 PROBLEM — Z22.7 TB LUNG, LATENT: Status: ACTIVE | Noted: 2025-01-27

## 2025-03-25 DIAGNOSIS — E11.42 CONTROLLED TYPE 2 DIABETES MELLITUS WITH DIABETIC POLYNEUROPATHY, WITHOUT LONG-TERM CURRENT USE OF INSULIN (HCC): Primary | ICD-10-CM

## 2025-03-26 NOTE — PROGRESS NOTES
Future Appointments   Date Time Provider Department Center   4/1/2025  9:40 AM Gamaliel Joshua MD [facility] DEP

## 2025-03-27 DIAGNOSIS — E11.42 CONTROLLED TYPE 2 DIABETES MELLITUS WITH DIABETIC POLYNEUROPATHY, WITHOUT LONG-TERM CURRENT USE OF INSULIN (HCC): ICD-10-CM

## 2025-03-27 LAB
ALBUMIN SERPL-MCNC: 3.8 G/DL (ref 3.5–5)
ALBUMIN/GLOB SERPL: 1.2 (ref 1.1–2.2)
ALP SERPL-CCNC: 77 U/L (ref 45–117)
ALT SERPL-CCNC: 85 U/L (ref 12–78)
ANION GAP SERPL CALC-SCNC: 7 MMOL/L (ref 2–12)
AST SERPL-CCNC: 45 U/L (ref 15–37)
BILIRUB SERPL-MCNC: 0.6 MG/DL (ref 0.2–1)
BUN SERPL-MCNC: 7 MG/DL (ref 6–20)
BUN/CREAT SERPL: 10 (ref 12–20)
CALCIUM SERPL-MCNC: 10 MG/DL (ref 8.5–10.1)
CHLORIDE SERPL-SCNC: 105 MMOL/L (ref 97–108)
CHOLEST SERPL-MCNC: 150 MG/DL
CO2 SERPL-SCNC: 28 MMOL/L (ref 21–32)
CREAT SERPL-MCNC: 0.73 MG/DL (ref 0.7–1.3)
CREAT UR-MCNC: 169 MG/DL
ERYTHROCYTE [DISTWIDTH] IN BLOOD BY AUTOMATED COUNT: 14.6 % (ref 11.5–14.5)
EST. AVERAGE GLUCOSE BLD GHB EST-MCNC: 169 MG/DL
GLOBULIN SER CALC-MCNC: 3.1 G/DL (ref 2–4)
GLUCOSE SERPL-MCNC: 140 MG/DL (ref 65–100)
HBA1C MFR BLD: 7.5 % (ref 4–5.6)
HCT VFR BLD AUTO: 44.8 % (ref 36.6–50.3)
HDLC SERPL-MCNC: 47 MG/DL
HDLC SERPL: 3.2 (ref 0–5)
HGB BLD-MCNC: 14.2 G/DL (ref 12.1–17)
LDLC SERPL CALC-MCNC: 78.4 MG/DL (ref 0–100)
MCH RBC QN AUTO: 28.7 PG (ref 26–34)
MCHC RBC AUTO-ENTMCNC: 31.7 G/DL (ref 30–36.5)
MCV RBC AUTO: 90.7 FL (ref 80–99)
MICROALBUMIN UR-MCNC: 5.85 MG/DL
MICROALBUMIN/CREAT UR-RTO: 35 MG/G (ref 0–30)
NRBC # BLD: 0 K/UL (ref 0–0.01)
NRBC BLD-RTO: 0 PER 100 WBC
PLATELET # BLD AUTO: 201 K/UL (ref 150–400)
PMV BLD AUTO: 10.9 FL (ref 8.9–12.9)
POTASSIUM SERPL-SCNC: 4.1 MMOL/L (ref 3.5–5.1)
PROT SERPL-MCNC: 6.9 G/DL (ref 6.4–8.2)
RBC # BLD AUTO: 4.94 M/UL (ref 4.1–5.7)
SODIUM SERPL-SCNC: 140 MMOL/L (ref 136–145)
SPECIMEN HOLD: NORMAL
TRIGL SERPL-MCNC: 123 MG/DL
VLDLC SERPL CALC-MCNC: 24.6 MG/DL
WBC # BLD AUTO: 7.5 K/UL (ref 4.1–11.1)

## 2025-03-28 ENCOUNTER — RESULTS FOLLOW-UP (OUTPATIENT)
Age: 85
End: 2025-03-28

## 2025-03-28 NOTE — RESULT ENCOUNTER NOTE
Results released to patient via [employer].  All labs are stable or at goal for him, except for increase in LFT's and uACR. DM stable and acceptable for his age. He has f/u on 4/1 to review.

## 2025-03-29 DIAGNOSIS — E11.42 CONTROLLED TYPE 2 DIABETES MELLITUS WITH DIABETIC POLYNEUROPATHY, WITHOUT LONG-TERM CURRENT USE OF INSULIN (HCC): ICD-10-CM

## 2025-03-31 RX ORDER — METFORMIN HYDROCHLORIDE 500 MG/1
500 TABLET, EXTENDED RELEASE ORAL 2 TIMES DAILY
Qty: 180 TABLET | Refills: 1 | OUTPATIENT
Start: 2025-03-31

## 2025-03-31 RX ORDER — EMPAGLIFLOZIN 10 MG/1
10 TABLET, FILM COATED ORAL DAILY
Qty: 90 TABLET | Refills: 1 | OUTPATIENT
Start: 2025-03-31

## 2025-03-31 SDOH — ECONOMIC STABILITY: FOOD INSECURITY: WITHIN THE PAST 12 MONTHS, THE FOOD YOU BOUGHT JUST DIDN'T LAST AND YOU DIDN'T HAVE MONEY TO GET MORE.: PATIENT DECLINED

## 2025-03-31 SDOH — ECONOMIC STABILITY: TRANSPORTATION INSECURITY
IN THE PAST 12 MONTHS, HAS THE LACK OF TRANSPORTATION KEPT YOU FROM MEDICAL APPOINTMENTS OR FROM GETTING MEDICATIONS?: NO

## 2025-03-31 SDOH — ECONOMIC STABILITY: FOOD INSECURITY: WITHIN THE PAST 12 MONTHS, YOU WORRIED THAT YOUR FOOD WOULD RUN OUT BEFORE YOU GOT MONEY TO BUY MORE.: PATIENT DECLINED

## 2025-03-31 SDOH — ECONOMIC STABILITY: INCOME INSECURITY: IN THE LAST 12 MONTHS, WAS THERE A TIME WHEN YOU WERE NOT ABLE TO PAY THE MORTGAGE OR RENT ON TIME?: PATIENT DECLINED

## 2025-04-01 ENCOUNTER — OFFICE VISIT (OUTPATIENT)
Age: 85
End: 2025-04-01
Payer: MEDICARE

## 2025-04-01 VITALS
RESPIRATION RATE: 16 BRPM | TEMPERATURE: 97.8 F | HEIGHT: 73 IN | OXYGEN SATURATION: 100 % | BODY MASS INDEX: 25.18 KG/M2 | WEIGHT: 190 LBS | SYSTOLIC BLOOD PRESSURE: 117 MMHG | HEART RATE: 68 BPM | DIASTOLIC BLOOD PRESSURE: 64 MMHG

## 2025-04-01 DIAGNOSIS — Z22.7 TB LUNG, LATENT: ICD-10-CM

## 2025-04-01 DIAGNOSIS — K51.919 ULCERATIVE COLITIS WITH COMPLICATION, UNSPECIFIED LOCATION (HCC): ICD-10-CM

## 2025-04-01 DIAGNOSIS — E78.00 PURE HYPERCHOLESTEROLEMIA: ICD-10-CM

## 2025-04-01 DIAGNOSIS — I25.10 CORONARY ARTERY DISEASE INVOLVING NATIVE CORONARY ARTERY OF NATIVE HEART WITHOUT ANGINA PECTORIS: ICD-10-CM

## 2025-04-01 DIAGNOSIS — I49.9 IRREGULAR HEART RHYTHM: ICD-10-CM

## 2025-04-01 DIAGNOSIS — R63.4 WEIGHT LOSS, UNINTENTIONAL: ICD-10-CM

## 2025-04-01 DIAGNOSIS — I10 ESSENTIAL HYPERTENSION, BENIGN: ICD-10-CM

## 2025-04-01 DIAGNOSIS — F33.42 RECURRENT MAJOR DEPRESSIVE DISORDER, IN FULL REMISSION: ICD-10-CM

## 2025-04-01 DIAGNOSIS — E11.42 CONTROLLED TYPE 2 DIABETES MELLITUS WITH DIABETIC POLYNEUROPATHY, WITHOUT LONG-TERM CURRENT USE OF INSULIN (HCC): Primary | ICD-10-CM

## 2025-04-01 DIAGNOSIS — R79.89 ELEVATED LFTS: ICD-10-CM

## 2025-04-01 DIAGNOSIS — C02.9 SQUAMOUS CELL CARCINOMA OF TONGUE: ICD-10-CM

## 2025-04-01 PROCEDURE — 99214 OFFICE O/P EST MOD 30 MIN: CPT | Performed by: INTERNAL MEDICINE

## 2025-04-01 PROCEDURE — 1126F AMNT PAIN NOTED NONE PRSNT: CPT | Performed by: INTERNAL MEDICINE

## 2025-04-01 PROCEDURE — 1123F ACP DISCUSS/DSCN MKR DOCD: CPT | Performed by: INTERNAL MEDICINE

## 2025-04-01 PROCEDURE — 3051F HG A1C>EQUAL 7.0%<8.0%: CPT | Performed by: INTERNAL MEDICINE

## 2025-04-01 PROCEDURE — 3074F SYST BP LT 130 MM HG: CPT | Performed by: INTERNAL MEDICINE

## 2025-04-01 PROCEDURE — 1159F MED LIST DOCD IN RCRD: CPT | Performed by: INTERNAL MEDICINE

## 2025-04-01 PROCEDURE — 93010 ELECTROCARDIOGRAM REPORT: CPT | Performed by: INTERNAL MEDICINE

## 2025-04-01 PROCEDURE — 93005 ELECTROCARDIOGRAM TRACING: CPT | Performed by: INTERNAL MEDICINE

## 2025-04-01 PROCEDURE — 3078F DIAST BP <80 MM HG: CPT | Performed by: INTERNAL MEDICINE

## 2025-04-01 RX ORDER — RIFAMPIN 150 MG/1
150 CAPSULE ORAL DAILY
COMMUNITY

## 2025-04-01 ASSESSMENT — PATIENT HEALTH QUESTIONNAIRE - PHQ9
4. FEELING TIRED OR HAVING LITTLE ENERGY: NOT AT ALL
6. FEELING BAD ABOUT YOURSELF - OR THAT YOU ARE A FAILURE OR HAVE LET YOURSELF OR YOUR FAMILY DOWN: NOT AT ALL
3. TROUBLE FALLING OR STAYING ASLEEP: NOT AT ALL
SUM OF ALL RESPONSES TO PHQ QUESTIONS 1-9: 3
5. POOR APPETITE OR OVEREATING: NEARLY EVERY DAY
SUM OF ALL RESPONSES TO PHQ QUESTIONS 1-9: 3
SUM OF ALL RESPONSES TO PHQ QUESTIONS 1-9: 3
8. MOVING OR SPEAKING SO SLOWLY THAT OTHER PEOPLE COULD HAVE NOTICED. OR THE OPPOSITE, BEING SO FIGETY OR RESTLESS THAT YOU HAVE BEEN MOVING AROUND A LOT MORE THAN USUAL: NOT AT ALL
9. THOUGHTS THAT YOU WOULD BE BETTER OFF DEAD, OR OF HURTING YOURSELF: NOT AT ALL
2. FEELING DOWN, DEPRESSED OR HOPELESS: NOT AT ALL
10. IF YOU CHECKED OFF ANY PROBLEMS, HOW DIFFICULT HAVE THESE PROBLEMS MADE IT FOR YOU TO DO YOUR WORK, TAKE CARE OF THINGS AT HOME, OR GET ALONG WITH OTHER PEOPLE: NOT DIFFICULT AT ALL
1. LITTLE INTEREST OR PLEASURE IN DOING THINGS: NOT AT ALL
SUM OF ALL RESPONSES TO PHQ QUESTIONS 1-9: 3
7. TROUBLE CONCENTRATING ON THINGS, SUCH AS READING THE NEWSPAPER OR WATCHING TELEVISION: NOT AT ALL

## 2025-04-01 ASSESSMENT — ENCOUNTER SYMPTOMS
SHORTNESS OF BREATH: 0
NAUSEA: 1
ABDOMINAL PAIN: 0
COUGH: 0
VOMITING: 0
CONSTIPATION: 0
DIARRHEA: 1
BLOOD IN STOOL: 0

## 2025-04-01 NOTE — ASSESSMENT & PLAN NOTE
Asymptomatic. Diagnosed in '20. Monitored by specialist- no acute findings meriting change in the plan

## 2025-04-01 NOTE — PROGRESS NOTES
Haider Leonard is a 84 y.o. male who was seen in clinic today (4/1/2025).  He was 20 minutes late to his appointment and seen without his wife today.    Assessment & Plan:   Below is the assessment and plan developed based on review of pertinent history, physical exam, labs, studies, and medications.      1. Controlled type 2 diabetes mellitus with diabetic polyneuropathy, without long-term current use of insulin (HCC)  Assessment & Plan:  Stable from last visit, at goal of his age and risk factors, goal is < 8% but closer to 7%.  No changes at this time. uACR abnormal for the first time, he is on SGLT2, will repeat at follow up.  Continue Gabapentin is working well.  [facility]  reviewed.   2. Coronary artery disease involving native coronary artery of native heart without angina pectoris  Assessment & Plan:  well controlled, asymptomatic.  BP is well controlled, lipids are well controlled.  Continue: current plan.     3. Essential hypertension, benign  Assessment & Plan:   Chronic, at goal (stable), continue current treatment plan  4. Pure hypercholesterolemia  Assessment & Plan:   Chronic, at goal (stable), continue current treatment plan  5. Recurrent major depressive disorder, in full remission  Assessment & Plan:  stable and well controlled, I reviewed treatment options with him, I reviewed life style changes to help improve mood, continue current treatment.    6. Squamous cell carcinoma of tongue  Assessment & Plan:  Asymptomatic. Diagnosed in '20. Monitored by specialist- no acute findings meriting change in the plan  7. Ulcerative colitis with complication, unspecified location (HCC)  Assessment & Plan:  Stable overall. Some mild diarrhea. Wife is trying to set up Skyrizi infusion.  Monitored by specialist- no acute findings meriting change in the plan  8. Irregular heart rhythm  Comments:  asymptomatic, PVC's, will continue to monitor, electrolytes are normal, reviewed when to see cardiology  Orders:  -

## 2025-04-01 NOTE — ASSESSMENT & PLAN NOTE
Stable overall. Some mild diarrhea. Wife is trying to set up Skyrizi infusion.  Monitored by specialist- no acute findings meriting change in the plan

## 2025-04-01 NOTE — ASSESSMENT & PLAN NOTE
Stable from last visit, at goal of his age and risk factors, goal is < 8% but closer to 7%.  No changes at this time. uACR abnormal for the first time, he is on SGLT2, will repeat at follow up.  Continue Gabapentin is working well.  [facility]  reviewed.

## 2025-04-02 DIAGNOSIS — E11.42 CONTROLLED TYPE 2 DIABETES MELLITUS WITH DIABETIC POLYNEUROPATHY, WITHOUT LONG-TERM CURRENT USE OF INSULIN (HCC): ICD-10-CM

## 2025-04-02 RX ORDER — EMPAGLIFLOZIN 25 MG/1
25 TABLET, FILM COATED ORAL DAILY
Qty: 90 TABLET | Refills: 1 | Status: SHIPPED | OUTPATIENT
Start: 2025-04-02

## 2025-04-09 DIAGNOSIS — R79.89 ELEVATED LFTS: ICD-10-CM

## 2025-04-09 LAB
ALBUMIN SERPL-MCNC: 3.8 G/DL (ref 3.5–5)
ALBUMIN/GLOB SERPL: 1.1 (ref 1.1–2.2)
ALP SERPL-CCNC: 80 U/L (ref 45–117)
ALT SERPL-CCNC: 53 U/L (ref 12–78)
AST SERPL-CCNC: 33 U/L (ref 15–37)
BILIRUB DIRECT SERPL-MCNC: 0.2 MG/DL (ref 0–0.2)
BILIRUB SERPL-MCNC: 0.4 MG/DL (ref 0.2–1)
GLOBULIN SER CALC-MCNC: 3.5 G/DL (ref 2–4)
PROT SERPL-MCNC: 7.3 G/DL (ref 6.4–8.2)

## 2025-04-10 ENCOUNTER — RESULTS FOLLOW-UP (OUTPATIENT)
Age: 85
End: 2025-04-10

## 2025-04-10 NOTE — RESULT ENCOUNTER NOTE
Results released to patient via [employer].  Repeat LFT's are back improving and almost back to normal but not quite.  No changes.

## 2025-04-23 DIAGNOSIS — E11.42 CONTROLLED TYPE 2 DIABETES MELLITUS WITH DIABETIC POLYNEUROPATHY, WITHOUT LONG-TERM CURRENT USE OF INSULIN (HCC): ICD-10-CM

## 2025-04-23 DIAGNOSIS — I25.10 CORONARY ARTERY DISEASE INVOLVING NATIVE CORONARY ARTERY OF NATIVE HEART WITHOUT ANGINA PECTORIS: ICD-10-CM

## 2025-04-23 DIAGNOSIS — I10 ESSENTIAL HYPERTENSION, BENIGN: ICD-10-CM

## 2025-04-23 DIAGNOSIS — F33.42 RECURRENT MAJOR DEPRESSIVE DISORDER, IN FULL REMISSION: ICD-10-CM

## 2025-04-23 DIAGNOSIS — E78.00 PURE HYPERCHOLESTEROLEMIA: ICD-10-CM

## 2025-04-23 RX ORDER — METOPROLOL TARTRATE 25 MG/1
TABLET, FILM COATED ORAL
Qty: 90 TABLET | Refills: 1 | Status: SHIPPED | OUTPATIENT
Start: 2025-04-23

## 2025-04-23 RX ORDER — SIMVASTATIN 40 MG
40 TABLET ORAL NIGHTLY
Qty: 90 TABLET | Refills: 1 | Status: SHIPPED | OUTPATIENT
Start: 2025-04-23

## 2025-04-23 RX ORDER — METFORMIN HYDROCHLORIDE 500 MG/1
500 TABLET, EXTENDED RELEASE ORAL 2 TIMES DAILY
Qty: 180 TABLET | Refills: 1 | Status: SHIPPED | OUTPATIENT
Start: 2025-04-23

## 2025-04-23 RX ORDER — EMPAGLIFLOZIN 10 MG/1
10 TABLET, FILM COATED ORAL DAILY
Qty: 90 TABLET | Refills: 1 | OUTPATIENT
Start: 2025-04-23

## 2025-05-10 DIAGNOSIS — E11.42 CONTROLLED TYPE 2 DIABETES MELLITUS WITH DIABETIC POLYNEUROPATHY, WITHOUT LONG-TERM CURRENT USE OF INSULIN (HCC): ICD-10-CM

## 2025-05-10 RX ORDER — GABAPENTIN 300 MG/1
300 CAPSULE ORAL NIGHTLY
Qty: 90 CAPSULE | Refills: 1 | Status: SHIPPED | OUTPATIENT
Start: 2025-05-10 | End: 2025-11-06

## 2025-05-10 RX ORDER — METFORMIN HYDROCHLORIDE 500 MG/1
1000 TABLET, EXTENDED RELEASE ORAL 2 TIMES DAILY
Qty: 360 TABLET | Refills: 3 | Status: SHIPPED | OUTPATIENT
Start: 2025-05-10

## 2025-05-10 RX ORDER — EMPAGLIFLOZIN 10 MG/1
10 TABLET, FILM COATED ORAL DAILY
Qty: 90 TABLET | Refills: 3 | OUTPATIENT
Start: 2025-05-10

## 2025-05-10 NOTE — TELEPHONE ENCOUNTER
Chart reviewed  Last filled on: 1/12/25 - 300mg - 90 tabs  Last visit: 4/1/2025    PDMP reviewed on 5/10/2025  6:30 PM.    Future Appointments   Date Time Provider Department Center   12/1/2025 10:00 AM Gamaliel Joshua MD [facility] DEP

## 2025-06-16 DIAGNOSIS — F33.42 RECURRENT MAJOR DEPRESSIVE DISORDER, IN FULL REMISSION: ICD-10-CM

## 2025-06-26 NOTE — PROGRESS NOTES
Deanna Aparicio is a 77 y.o. male who was seen in clinic today (7/6/2017). Assessment & Plan:  Diagnoses and all orders for this visit:    1. Controlled type 2 diabetes mellitus with diabetic neuropathy, unspecified long term insulin use status- well controlled, improved, home glucose monitoring emphasized, long term diabetic complications discussed and continue current medications except will increase MTF from 2/day to 3/day per his request and concerns about FBS. Also neuropathy is slightly worse so will try increasing dose of Gabapentin. -     simvastatin (ZOCOR) 40 mg tablet; TAKE 1 TABLET BY MOUTH NIGHTLY  -     metFORMIN (GLUCOPHAGE) 500 mg tablet; TAKE 1 TABLET IN THE MORNING AND 2 TABLETS IN THE EVENING  -     lisinopril (PRINIVIL, ZESTRIL) 10 mg tablet; TAKE 1 TABLET BY MOUTH DAILY  -     gabapentin (NEURONTIN) 300 mg capsule; TAKE 1 CAPSULE BY MOUTH THREE TIMES DAILY  -      DIABETES FOOT EXAM    2. Hyperlipidemia, unspecified hyperlipidemia type- at goal, continue current treatment    -     simvastatin (ZOCOR) 40 mg tablet; TAKE 1 TABLET BY MOUTH NIGHTLY    3. Coronary artery disease involving native coronary artery without angina pectoris, unspecified whether native or transplanted heart- asymptomatic. BP is well controlled, lipids are well controlled. Continue taking: current medications. -     lisinopril (PRINIVIL, ZESTRIL) 10 mg tablet; TAKE 1 TABLET BY MOUTH DAILY  -     metoprolol tartrate (LOPRESSOR) 25 mg tablet; TAKE 1/2 TABLET BY MOUTH TWICE DAILY    4. Moderate episode of recurrent major depressive disorder ([facility] [address]- fluctuating, could be better, reviewed treatment options with him, reviewed seeing a counselor, reviewed life style changes to help improve mood, following changes were made today: will increase SSRI. Reviewed this may help slightly but most of his issues revolve around their grandson and medications are not going to fix this. Reviewed expectations.      - sertraline (ZOLOFT) 100 mg tablet; TAKE 1 TABLET BY MOUTH DAILY  -     traZODone (DESYREL) 50 mg tablet; Take 1 Tab by mouth nightly. Follow-up Disposition:  Return in about 6 months (around 1/6/2018) for FULL PHYSICAL - 30 minutes. ----------------------------------------------------------------------    Subjective:  Celena Urena was seen today for Diabetes; Coronary Artery Disease; and Depression    Mental Health Review  Patient is seen for depression. Since last visit: he reports mood is fluctuating. He reports feeling down for the last week. All of this revolves around his grandson. Ongoing depression symptoms include: anhedonia, and \"short fuse\". He denies any: depressed mood, hopelessness. Reports experiences the following side effects from the treatment: none. Endocrine Review  He is seen for diabetes. Since last visit he reports: no significant changes. Home glucose monitoring: is performed sporadically, fasting values range 125-130. He is checking his sugars twice per week. He reports medication compliance: compliant all of the time. Medication side effects: none. Diabetic diet compliance: compliant all of the time. Lab review: labs reviewed and discussed with patient. Eye exam: UTD, he reports done last month. He reports neuropathy is slightly worse. Lab Results   Component Value Date/Time    Hemoglobin A1c 6.6 06/28/2017 12:00 PM    Glucose 131 06/28/2017 12:00 PM    Creatinine 1.10 06/28/2017 12:00 PM    Cholesterol, total 134 11/14/2016 07:44 AM    HDL Cholesterol 45 11/14/2016 07:44 AM    LDL, calculated 62 11/14/2016 07:44 AM    Triglyceride 135 11/14/2016 07:44 AM       Cardiovascular Review  The patient has hypertension and hyperlipidemia. Since last visit: no changes. He reports taking medications as instructed, no medication side effects noted, patient does not perform home BP monitoring.   Diet and Lifestyle: generally follows a low fat low cholesterol diet, generally follows a low sodium diet, sedentary. Labs: reviewed and discussed with patient. Lab Results   Component Value Date/Time    Sodium 141 06/28/2017 12:00 PM    Potassium 4.6 06/28/2017 12:00 PM    Cholesterol, total 134 11/14/2016 07:44 AM    HDL Cholesterol 45 11/14/2016 07:44 AM    LDL, calculated 62 11/14/2016 07:44 AM    Triglyceride 135 11/14/2016 07:44 AM            Prior to Admission medications    Medication Sig Start Date End Date Taking? Authorizing Provider   lisinopril (PRINIVIL, ZESTRIL) 10 mg tablet TAKE 1 TABLET BY MOUTH DAILY 6/11/17  Yes Juan J Gonzalez MD   gabapentin (NEURONTIN) 100 mg capsule TAKE 1 CAPSULE BY MOUTH THREE TIMES DAILY 6/11/17  Yes Juan J Gonzalez MD   metoprolol tartrate (LOPRESSOR) 25 mg tablet TAKE 1/2 TABLET BY MOUTH TWICE DAILY 6/11/17  Yes Juan J Gonzalez MD   sertraline (ZOLOFT) 50 mg tablet TAKE 1.5 TABLET BY MOUTH DAILY 4/7/17  Yes Juan J Gonzalez MD   traZODone (DESYREL) 50 mg tablet Take 1 Tab by mouth nightly. 4/7/17  Yes Juan J Gonzalez MD   simvastatin (ZOCOR) 40 mg tablet TAKE 1 TABLET BY MOUTH NIGHTLY 2/5/17  Yes Juan J Gonzalez MD   metFORMIN (GLUCOPHAGE) 500 mg tablet TAKE 1 TABLET BY MOUTH TWICE DAILY WITH MEAL 2/3/17  Yes Juan J Gonzalez MD   zolpidem (AMBIEN) 5 mg tablet Take 1 Tab by mouth nightly as needed. 1/6/17  Yes Historical Provider   Blood-Glucose Meter (TRUE METRIX GLUCOSE METER) misc Use to test blood sugars daily. DX:  E11.49 11/10/16  Yes Juan J Gonzalez MD   glucose blood VI test strips (TRUE METRIX GLUCOSE TEST STRIP) strip Test blood sugars daily. DX: E11.49 11/10/16  Yes Juan J Gonzalez MD   balsalazide (COLAZAL) 750 mg capsule Take 2,250 mg by mouth two (2) times a day.    Yes Historical Provider   fluticasone (FLONASE) 50 mcg/actuation nasal spray USE 2 SPRAYS IN EACH NOSTRIL DAILY  Patient taking differently: USE 2 SPRAYS IN EACH NOSTRIL DAILY as needed 6/14/16  Yes Torres SANDOVAL Edie Alvarado MD   VIT B6/MAG CIT & OX/POTASS CIT (THERALITH XR PO) Take 2 tablets by mouth two (2) times a day. Yes Historical Provider   Lancets & Blood Glucose Strips C[employer] Element glucometer 2/26/13  Yes Brendalyn Dance, MD   aspirin 81 mg tablet Take 81 mg by mouth daily. Indications: MYOCARDIAL INFARCTION PREVENTION   Yes Historical Provider   MULTIVITAMINS WITH FLUORIDE (MULTI-VITAMIN PO) Take 1 tablet by mouth daily. Yes Historical Provider   Cholecalciferol, Vitamin D3, (VITAMIN D3) 1,000 unit Cap Take 2,000 Units by mouth daily. Indications: PREVENTION OF VITAMIN D DEFICIENCY   Yes Historical Provider          Allergies   Allergen Reactions    Apriso [Mesalamine] Diarrhea    Prednisone Other (comments)     constipation    Sulfasalazine Hives           Review of Systems   Constitutional: Negative for malaise/fatigue and weight loss. Respiratory: Negative for cough and shortness of breath. Cardiovascular: Negative for chest pain and palpitations. Gastrointestinal: Negative for abdominal pain, constipation, diarrhea, nausea and vomiting. Musculoskeletal: Positive for joint pain. Both thumbs on/off pain, no obvious triggers, no trauma, also having some shoulder pain (L>R)   Neurological: Positive for tingling. Psychiatric/Behavioral: Positive for depression. Objective:   Physical Exam   Constitutional: No distress. HENT:   Mouth/Throat: Mucous membranes are normal.   Eyes: Conjunctivae are normal. No scleral icterus. Neck: Neck supple. Cardiovascular: Regular rhythm and normal heart sounds. No murmur heard. Pulmonary/Chest: Effort normal and breath sounds normal. He has no wheezes. He has no rales. Abdominal: Soft. Bowel sounds are normal. He exhibits no mass. There is no hepatosplenomegaly. There is no tenderness. Musculoskeletal: He exhibits no edema. Lymphadenopathy:     He has no cervical adenopathy. Neurological:   Monofilament intact bilaterally.  Pulses R: 1+ and L: 1+. No open wounds. No skin lesions. Skin: No rash noted. Psychiatric: He has a normal mood and affect. His behavior is normal.         Visit Vitals    /68    Pulse 60    Temp 97.9 °F (36.6 °C) (Oral)    Resp 16    Ht 6' 0.1\" (1.831 m)    Wt 226 lb (102.5 kg)    SpO2 97%    BMI 30.57 kg/m2         Disclaimer:  Advised him to call back or return to office if symptoms worsen/change/persist.  Discussed expected course/resolution/complications of diagnosis in detail with patient. Medication risks/benefits/costs/interactions/alternatives discussed with patient. He was given an after visit summary which includes diagnoses, current medications, & vitals. He expressed understanding with the diagnosis and plan.         Kevin Austin MD 7 (severe pain)

## 2025-09-02 ENCOUNTER — TELEPHONE (OUTPATIENT)
Age: 85
End: 2025-09-02

## (undated) DEVICE — SURGICAL PROCEDURE PACK BASIN MAJ SET CUST NO CAUT

## (undated) DEVICE — SOLIDIFIER FLUID 3000 CC ABSORB

## (undated) DEVICE — KENDALL RADIOLUCENT FOAM MONITORING ELECTRODE -RECTANGULAR SHAPE: Brand: KENDALL

## (undated) DEVICE — GARMENT,MEDLINE,DVT,INT,CALF,MED, GEN2: Brand: MEDLINE

## (undated) DEVICE — NEEDLE HYPO 18GA L1.5IN PNK S STL HUB POLYPR SHLD REG BVL

## (undated) DEVICE — CATH IV AUTOGRD BC BLU 22GA 25 -- INSYTE

## (undated) DEVICE — CONNECTOR TBNG AUX H2O JET DISP FOR OLY 160/180 SER

## (undated) DEVICE — NEONATAL-ADULT SPO2 SENSOR: Brand: NELLCOR

## (undated) DEVICE — REM POLYHESIVE ADULT PATIENT RETURN ELECTRODE: Brand: VALLEYLAB

## (undated) DEVICE — 40418 TRENDELENBURG ONE-STEP ARM PROTECTORS LARGE (1 PAIR): Brand: 40418 TRENDELENBURG ONE-STEP ARM PROTECTORS LARGE (1 PAIR)

## (undated) DEVICE — WRISTBAND ID AD W1XL11.5IN RED POLY ALRG PREPRINTED PERM

## (undated) DEVICE — TOWEL SURG W17XL27IN STD BLU COT NONFENESTRATED PREWASHED

## (undated) DEVICE — NEEDLE HYPO 25GA L1.5IN BLU POLYPR HUB S STL REG BVL STR

## (undated) DEVICE — SUT SLK 0 30IN SH BLK --

## (undated) DEVICE — SUT CHRMC 3-0 27IN SH BRN --

## (undated) DEVICE — INFECTION CONTROL KIT SYS

## (undated) DEVICE — FORCEPS BX L240CM JAW DIA2.8MM L CAP W/ NDL MIC MESH TOOTH

## (undated) DEVICE — BAG BELONG PT PERS CLEAR HANDL

## (undated) DEVICE — 1200 GUARD II KIT W/5MM TUBE W/O VAC TUBE: Brand: GUARDIAN

## (undated) DEVICE — SYR 10ML CTRL LR LCK NSAF LF --

## (undated) DEVICE — HANDLE LT SNAP ON ULT DURABLE LENS FOR TRUMPF ALC DISPOSABLE

## (undated) DEVICE — AIRLIFE™ U/CONNECT-IT OXYGEN TUBING 7 FEET (2.1 M) CRUSH-RESISTANT OXYGEN TUBING, VINYL TIPPED: Brand: AIRLIFE™

## (undated) DEVICE — SYRINGE MED 20ML STD CLR PLAS LUERLOCK TIP N CTRL DISP

## (undated) DEVICE — INTENDED FOR TISSUE SEPARATION, AND OTHER PROCEDURES THAT REQUIRE A SHARP SURGICAL BLADE TO PUNCTURE OR CUT.: Brand: BARD-PARKER ® CARBON RIB-BACK BLADES

## (undated) DEVICE — MICRODISSECTION NEEDLE STRAIGHT SLEEVE: Brand: COLORADO

## (undated) DEVICE — PACK,EENT,TURBAN DRAPE,PK II: Brand: MEDLINE

## (undated) DEVICE — SUTURE PERMAHAND SZ 2-0 L18IN NONABSORBABLE BLK L26MM PS 1588H

## (undated) DEVICE — BW-412T DISP COMBO CLEANING BRUSH: Brand: SINGLE USE COMBINATION CLEANING BRUSH

## (undated) DEVICE — FCPS RAD JAW 4 JMB 240CM W/NDL -- BX/20

## (undated) DEVICE — QUILTED PREMIUM COMFORT UNDERPAD,EXTRA HEAVY: Brand: WINGS

## (undated) DEVICE — SOLUTION IV 1000ML 0.9% SOD CHL

## (undated) DEVICE — Z DISCONTINUED NO SUB IDED SET EXTN W/ 4 W STPCOCK M SPIN LOK 36IN

## (undated) DEVICE — BAG SPEC BIOHZD LF 2MIL 6X10IN -- CONVERT TO ITEM 357326

## (undated) DEVICE — BIPOLAR FORCEPS CORD: Brand: VALLEYLAB

## (undated) DEVICE — Device

## (undated) DEVICE — Z DISCONTINUED USE 2751540 TUBING IRRIG L10IN DISP PMP ENDOGATOR

## (undated) DEVICE — SET ADMIN 16ML TBNG L100IN 2 Y INJ SITE IV PIGGY BK DISP

## (undated) DEVICE — ENDO CARRY-ON PROCEDURE KIT INCLUDES ENZYMATIC SPONGE, GAUZE, BIOHAZARD LABEL, TRAY, LUBRICANT, DIRTY SCOPE LABEL, WATER LABEL, TRAY, DRAWSTRING PAD, AND DEFENDO 4-PIECE KIT.: Brand: ENDO CARRY-ON PROCEDURE KIT

## (undated) DEVICE — SYR 50ML SLIP TIP NSAF LF STRL --

## (undated) DEVICE — KIT IV STRT W CHLORAPREP PD 1ML

## (undated) DEVICE — INSULATED BLADE ELECTRODE: Brand: EDGE

## (undated) DEVICE — SMOKE EVACUATION PENCIL: Brand: VALLEYLAB

## (undated) DEVICE — SET EXTN TBNG L BOR 4 W STPCOCK ST 32IN PRIMING VOL 6ML

## (undated) DEVICE — SOL IRR SOD CL 0.9% 1000ML BTL --

## (undated) DEVICE — CONTAINER SPEC 20 ML LID NEUT BUFF FORMALIN 10 % POLYPR STS

## (undated) DEVICE — FORCEPS BX L240CM JAW DIA2.2MM RAD JAW 4 HOT DISP

## (undated) DEVICE — CANN NASAL O2 CAPNOGRAPHY AD -- FILTERLINE

## (undated) DEVICE — TRAP SURG QUAD PARABOLA SLOT DSGN SFTY SCRN TRAPEASE

## (undated) DEVICE — SNARE VASC L240CM LOOP W10MM SHTH DIA2.4MM RND STIFF CLD

## (undated) DEVICE — ROCKER SWITCH PENCIL BLADE ELECTRODE, HOLSTER: Brand: EDGE